# Patient Record
Sex: FEMALE | Race: WHITE | NOT HISPANIC OR LATINO | Employment: FULL TIME | RURAL
[De-identification: names, ages, dates, MRNs, and addresses within clinical notes are randomized per-mention and may not be internally consistent; named-entity substitution may affect disease eponyms.]

---

## 2017-01-16 PROBLEM — Z00.00 WELLNESS EXAMINATION: Status: ACTIVE | Noted: 2017-01-16

## 2017-01-17 ENCOUNTER — OFFICE VISIT (OUTPATIENT)
Dept: FAMILY MEDICINE CLINIC | Facility: CLINIC | Age: 22
End: 2017-01-17

## 2017-01-17 VITALS
BODY MASS INDEX: 40.49 KG/M2 | HEART RATE: 108 BPM | OXYGEN SATURATION: 98 % | SYSTOLIC BLOOD PRESSURE: 108 MMHG | DIASTOLIC BLOOD PRESSURE: 78 MMHG | RESPIRATION RATE: 16 BRPM | HEIGHT: 67 IN | WEIGHT: 258 LBS | TEMPERATURE: 98.7 F

## 2017-01-17 DIAGNOSIS — H60.90 OTITIS EXTERNA, UNSPECIFIED CHRONICITY, UNSPECIFIED LATERALITY, UNSPECIFIED TYPE: ICD-10-CM

## 2017-01-17 DIAGNOSIS — H92.09 OTALGIA, UNSPECIFIED LATERALITY: Primary | ICD-10-CM

## 2017-01-17 PROCEDURE — 99212 OFFICE O/P EST SF 10 MIN: CPT | Performed by: FAMILY MEDICINE

## 2017-01-17 RX ORDER — AZELASTINE HYDROCHLORIDE 0.5 MG/ML
1 SOLUTION/ DROPS OPHTHALMIC 2 TIMES DAILY PRN
Refills: 2 | COMMUNITY
Start: 2016-12-07 | End: 2017-05-01

## 2017-01-17 RX ORDER — ALPRAZOLAM 0.5 MG/1
0.25 TABLET ORAL 3 TIMES DAILY PRN
Qty: 15 TABLET | Refills: 0 | OUTPATIENT
Start: 2017-01-17 | End: 2017-02-17 | Stop reason: SDUPTHER

## 2017-01-17 RX ORDER — CITALOPRAM 20 MG/1
TABLET ORAL
Qty: 30 TABLET | Refills: 5 | Status: SHIPPED | OUTPATIENT
Start: 2017-01-17 | End: 2018-03-15 | Stop reason: SDUPTHER

## 2017-01-17 RX ORDER — HYDROCORTISONE AND ACETIC ACID 20.75; 10.375 MG/ML; MG/ML
3 SOLUTION AURICULAR (OTIC) 2 TIMES DAILY PRN
Qty: 10 ML | Refills: 0 | Status: SHIPPED | OUTPATIENT
Start: 2017-01-17 | End: 2017-09-13

## 2017-01-17 RX ORDER — FENOPROFEN CALCIUM 200 MG
CAPSULE ORAL 2 TIMES DAILY
Qty: 59 ML | Refills: 0 | Status: SHIPPED | OUTPATIENT
Start: 2017-01-17 | End: 2017-09-13

## 2017-01-17 NOTE — PROGRESS NOTES
Subjective   My Chavez is a 21 y.o. female presenting with chief complaint of:   Chief Complaint   Patient presents with   • Earache     both, R is worse       History of Present Illness : Alone. She has had 2-3 weeks of itching, mild discomfort both ear canals.  She denies sinus/nasal congestion, fullness of ears, decreased hearing, otorrhea, sore throat, cough.     Other chronic problem/s to consider: none    The following portions of the patient's history were reviewed and updated as appropriate: allergies, current medications, past family history, past medical history, past social history, past surgical history and problem list.  TCC migrated      Current Outpatient Prescriptions:   •  azelastine (OPTIVAR) 0.05 % ophthalmic solution, Administer 1 drop to both eyes 2 (Two) Times a Day As Needed., Disp: , Rfl: 2  •  ferrous sulfate 325 (65 FE) MG tablet, Take 325 mg by mouth daily with breakfast., Disp: , Rfl:   •  ALPRAZolam (XANAX) 0.5 MG tablet, Take 0.5 tablets by mouth 3 (Three) Times a Day As Needed for anxiety., Disp: 15 tablet, Rfl: 0  •  citalopram (CeleXA) 20 MG tablet, TAKE ONE TABLET DAILY, Disp: 30 tablet, Rfl: 5    No Known Allergies    Review of Systems  ROS:  GENERAL:  Active home/work.  Sleeps ok. No fever.d.  HEENT: No head injury or headache,  No vision change, No hearing loss/pain/drainage.  No sore throat.  No nasal/sinus congestion/drainage. No epistaxis.  CHEST: No chest wall tenderness or mass. No cough, wheeze, SOB or hemoptysis.    Results for orders placed or performed in visit on 10/04/16   Vitamin B12   Result Value Ref Range    Vitamin B-12 675 239 - 931 pg/mL   Folate   Result Value Ref Range    Folate 12.30 >2.75 ng/mL   Ferritin   Result Value Ref Range    Ferritin 13.80 6.24 - 137.00 ng/mL   Iron level   Result Value Ref Range    Iron 37 (L) 42 - 180 mcg/dL       No results found for: HGBA1C    Lab Results   Component Value Date     09/27/2016    K 4.3 09/27/2016    CL  "105 09/27/2016    CO2 23.0 (L) 09/27/2016    BUN 11 09/27/2016    CREATININE 0.59 09/27/2016    CALCIUM 9.1 09/27/2016       No results found for: PSA     Objective   Visit Vitals   • /78 (BP Location: Left arm, Patient Position: Sitting, Cuff Size: Large Adult)   • Pulse 108   • Temp 98.7 °F (37.1 °C) (Oral)   • Resp 16   • Ht 67\" (170.2 cm)   • Wt 258 lb (117 kg)   • LMP 01/12/2017 (Exact Date)   • SpO2 98%   • BMI 40.41 kg/m2       Physical Exam  GENERAL:  Well nourished/developed  in no acute distress. Obese.   SKIN: Turgor excellent, without wound, rash, lesion.   HEENT: Normal cephalic without trauma.  Pupils equal round reactive to light. Extraocular motions full without nystagmus.   External canals nonobstructive nontender without reddness; mild scaley. Tymphatic membranes raman with cheikh structures intact.   Oral cavity without growths, exudates, and moist.  Posterior pharnyx without mass, obstruction, reddness.  No thyroidmegaly, mass, tenderness, lymphadenopathy and supple.   CV: Regular rhythm.  No murmur, gallop, edema.   CHEST: No chest wall tenderness or mass.    PSYCH: Oriented x 3.  Pleasant calm, well kept.  Purposeful/directed conservation with intact short/long gross memory.     Assessment/Plan   1. Otalgia, unspecified laterality  But likely otitis externa-chronic  - hydrocortisone ([employer] CORTISONE LONG-LASTING) 1 % lotion; Apply  topically 2 (Two) Times a Day.  Dispense: 59 mL; Refill: 0  - acetic acid-hydrocortisone (VOSOL-HC) 1-2 % otic solution; Administer 3 drops into both ears 2 (Two) Times a Day As Needed (itching ears).  Dispense: 10 mL; Refill: 0    2. Otitis externa, unspecified chronicity, unspecified laterality, unspecified type  same  - hydrocortisone (CVS CORTISONE LONG-LASTING) 1 % lotion; Apply  topically 2 (Two) Times a Day.  Dispense: 59 mL; Refill: 0  - acetic acid-hydrocortisone (VOSOL-HC) 1-2 % otic solution; Administer 3 drops into both ears 2 (Two) Times a Day As " Needed (itching ears).  Dispense: 10 mL; Refill: 0    Rx: reviewed/see above and:  LAB: reviewed past TCC as needed, above and new orders: none  Wrap-up/other instructions  There are no Patient Instructions on file for this visit.    Follow up: Return if symptoms worsen or fail to improve.

## 2017-01-17 NOTE — MR AVS SNAPSHOT
My Chavez   1/17/2017 11:30 AM   Office Visit    Dept Phone:  [phone]   Encounter #:  [id number]    Provider:  Mak Nuñez MD   Department:  [facility]                Your Full Care Plan              Today's Medication Changes          These changes are accurate as of: 1/17/17 12:15 PM.  If you have any questions, ask your nurse or doctor.               New Medication(s)Ordered:     acetic acid-hydrocortisone 1-2 % otic solution   Commonly known as:  VOSOL-HC   Administer 3 drops into both ears 2 (Two) Times a Day As Needed (itching ears).   Started by:  Mak Nuñez MD       hydrocortisone 1 % lotion   Commonly known as:  [employer]S CORTISONE LONG-LASTING   Apply  topically 2 (Two) Times a Day.   Started by:  Mak Nuñez MD         Stop taking medication(s)listed here:     naltrexone-bupropion ER 8-90 MG tablet   Commonly known as:  CONTRAVE   Stopped by:  Mak Nuñez MD                Where to Get Your Medications      These medications were sent to [facility] - [place], [place] - [address] - [phone]  - [phone] FX  [address] [place] [address]     Phone:  [phone]     acetic acid-hydrocortisone 1-2 % otic solution    hydrocortisone 1 % lotion                  Your Updated Medication List          This list is accurate as of: 1/17/17 12:15 PM.  Always use your most recent med list.                acetic acid-hydrocortisone 1-2 % otic solution   Commonly known as:  VOSOL-HC   Administer 3 drops into both ears 2 (Two) Times a Day As Needed (itching ears).       ALPRAZolam 0.5 MG tablet   Commonly known as:  XANAX       azelastine 0.05 % ophthalmic solution   Commonly known as:  OPTIVAR       ferrous sulfate 325 (65 FE) MG tablet       hydrocortisone 1 % lotion   Commonly known as:  CVS CORTISONE LONG-LASTING   Apply  topically 2 (Two) Times a Day.               You Were Diagnosed With        "Codes Comments    Otalgia, unspecified laterality    -  Primary ICD-10-CM: H92.09  ICD-9-CM: 388.70     Otitis externa, unspecified chronicity, unspecified laterality, unspecified type     ICD-10-CM: H60.90  ICD-9-CM: 380.10       Instructions     None    Patient Instructions History      Upcoming Appointments     Visit Type Date Time Department    OFFICE VISIT 1/17/2017 11:30 AM [facility]      [employer] Signup     Our records indicate that you have declined [employer] signup. If you would like to sign up for [employer], please email [email or web address] or call [phone] to obtain an activation code.             Other Info from Your Visit           Allergies     No Known Allergies      Reason for Visit     Earache both, R is worse      Vital Signs     Blood Pressure Pulse Temperature Respirations Height Weight    108/78 (BP Location: Left arm, Patient Position: Sitting, Cuff Size: Large Adult) 108 98.7 °F (37.1 °C) (Oral) 16 67\" (170.2 cm) 258 lb (117 kg)    Last Menstrual Period Oxygen Saturation Body Mass Index Smoking Status          01/12/2017 (Exact Date) 98% 40.41 kg/m2 Former Smoker        Problems and Diagnoses Noted     Otitis externa    Wellness examination    Ear pain    -  Primary        "

## 2017-01-25 ENCOUNTER — TELEPHONE (OUTPATIENT)
Dept: FAMILY MEDICINE CLINIC | Facility: CLINIC | Age: 22
End: 2017-01-25

## 2017-01-25 PROBLEM — R04.0 EPISTAXIS: Status: ACTIVE | Noted: 2017-01-25

## 2017-01-25 NOTE — TELEPHONE ENCOUNTER
Pt came in and filled out nurse communication form and states she has had a cold for about 2 weeks but in last 24 hours anytime pt sneezes her nose has started bleeding and pt is wanting to know what to do, [phone] AVTAR

## 2017-02-17 RX ORDER — ALPRAZOLAM 0.5 MG/1
TABLET ORAL
Qty: 15 TABLET | Refills: 0 | OUTPATIENT
Start: 2017-02-17 | End: 2017-05-25 | Stop reason: SDUPTHER

## 2017-05-01 ENCOUNTER — OFFICE VISIT (OUTPATIENT)
Dept: FAMILY MEDICINE CLINIC | Facility: CLINIC | Age: 22
End: 2017-05-01

## 2017-05-01 ENCOUNTER — TELEPHONE (OUTPATIENT)
Dept: FAMILY MEDICINE CLINIC | Facility: CLINIC | Age: 22
End: 2017-05-01

## 2017-05-01 VITALS
TEMPERATURE: 98.3 F | OXYGEN SATURATION: 97 % | HEART RATE: 118 BPM | HEIGHT: 67 IN | BODY MASS INDEX: 40.18 KG/M2 | SYSTOLIC BLOOD PRESSURE: 130 MMHG | RESPIRATION RATE: 18 BRPM | DIASTOLIC BLOOD PRESSURE: 70 MMHG | WEIGHT: 256 LBS

## 2017-05-01 DIAGNOSIS — J32.9 SINUSITIS, UNSPECIFIED CHRONICITY, UNSPECIFIED LOCATION: Primary | ICD-10-CM

## 2017-05-01 DIAGNOSIS — R05.9 COUGH: ICD-10-CM

## 2017-05-01 DIAGNOSIS — J40 BRONCHITIS: ICD-10-CM

## 2017-05-01 PROCEDURE — 99212 OFFICE O/P EST SF 10 MIN: CPT | Performed by: FAMILY MEDICINE

## 2017-05-01 RX ORDER — GUAIFENESIN AND CODEINE PHOSPHATE 100; 10 MG/5ML; MG/5ML
5 SOLUTION ORAL 3 TIMES DAILY PRN
Qty: 180 ML | Refills: 0 | Status: SHIPPED | OUTPATIENT
Start: 2017-05-01 | End: 2017-09-13

## 2017-05-01 RX ORDER — AMOXICILLIN AND CLAVULANATE POTASSIUM 500; 125 MG/1; MG/1
1 TABLET, FILM COATED ORAL 2 TIMES DAILY
Refills: 0 | COMMUNITY
Start: 2017-04-27 | End: 2017-05-07

## 2017-05-01 RX ORDER — METHYLPREDNISOLONE 4 MG/1
TABLET ORAL
Qty: 21 TABLET | Refills: 0 | Status: SHIPPED | OUTPATIENT
Start: 2017-05-01 | End: 2017-09-13

## 2017-05-25 RX ORDER — ALPRAZOLAM 0.5 MG/1
TABLET ORAL
Qty: 15 TABLET | Refills: 0 | OUTPATIENT
Start: 2017-05-25 | End: 2017-08-07 | Stop reason: SDUPTHER

## 2017-08-07 RX ORDER — ALPRAZOLAM 0.5 MG/1
TABLET ORAL
Qty: 15 TABLET | Refills: 0 | OUTPATIENT
Start: 2017-08-07 | End: 2017-12-07 | Stop reason: SDUPTHER

## 2017-09-12 PROBLEM — Z01.89 LABORATORY TEST: Status: ACTIVE | Noted: 2017-09-12

## 2017-09-13 ENCOUNTER — OFFICE VISIT (OUTPATIENT)
Dept: FAMILY MEDICINE CLINIC | Facility: CLINIC | Age: 22
End: 2017-09-13

## 2017-09-13 VITALS
DIASTOLIC BLOOD PRESSURE: 62 MMHG | TEMPERATURE: 98.8 F | RESPIRATION RATE: 16 BRPM | HEIGHT: 67 IN | SYSTOLIC BLOOD PRESSURE: 102 MMHG | WEIGHT: 267 LBS | HEART RATE: 104 BPM | BODY MASS INDEX: 41.91 KG/M2 | OXYGEN SATURATION: 99 %

## 2017-09-13 DIAGNOSIS — F32.A DEPRESSION, UNSPECIFIED DEPRESSION TYPE: Chronic | ICD-10-CM

## 2017-09-13 DIAGNOSIS — E66.9 OBESITY, UNSPECIFIED OBESITY SEVERITY, UNSPECIFIED OBESITY TYPE: Primary | Chronic | ICD-10-CM

## 2017-09-13 DIAGNOSIS — F41.9 ANXIETY: Chronic | ICD-10-CM

## 2017-09-13 DIAGNOSIS — R10.9 ABDOMINAL PAIN, UNSPECIFIED LOCATION: ICD-10-CM

## 2017-09-13 DIAGNOSIS — K21.9 GASTROESOPHAGEAL REFLUX DISEASE, ESOPHAGITIS PRESENCE NOT SPECIFIED: ICD-10-CM

## 2017-09-13 DIAGNOSIS — D64.9 ANEMIA, UNSPECIFIED TYPE: ICD-10-CM

## 2017-09-13 PROCEDURE — 99213 OFFICE O/P EST LOW 20 MIN: CPT | Performed by: FAMILY MEDICINE

## 2017-09-13 RX ORDER — CALCIUM CARBONATE 750 MG/1
1500 TABLET, CHEWABLE ORAL 4 TIMES DAILY PRN
COMMUNITY
Start: 2017-07-13 | End: 2018-05-31

## 2017-09-13 RX ORDER — PANTOPRAZOLE SODIUM 40 MG/1
40 TABLET, DELAYED RELEASE ORAL DAILY
Qty: 30 TABLET | Refills: 2 | Status: SHIPPED | OUTPATIENT
Start: 2017-09-13 | End: 2018-03-15 | Stop reason: SDUPTHER

## 2017-09-13 RX ORDER — RANITIDINE 150 MG/1
150 TABLET ORAL DAILY
COMMUNITY
Start: 2017-07-26 | End: 2017-09-13

## 2017-09-13 NOTE — PROGRESS NOTES
Subjective   My Chavez is a 22 y.o. female presenting with chief complaint of:   Chief Complaint   Patient presents with   • Heartburn       History of Present Illness :  Alone.  Here for primarily an acute issue today; reflux.   Periodic feels material come into throat with hearburn.   No choking.   Has decreased meals to small amounts to try to help.  Ongoing badly two months.  Getting worse..   Has chronic problems to consider.  One of these problems is obesity.        Other chronic problem/s to consider:   Anemia/iron deficiency: This has been present for years/over a year.  It is chronic.  These has been GI evaulation in the past. There is no current melena, hematochezia. It has been benign to date and stable/treating with iron/watching.  Anxiety: This has been present for years/over a year.  It is chronic.  It is stable.  It is associated with stressors:.  Medications being used help.  Depression: This has been present for years/over a year.  It is chronic.  It is stable.  It is associated with stressors: Medications being used help.  No suicide ideation/intent.   Obesity/overweight: This has been present for years/over a year.  It is chronic.     It is stable; there has been no recent major change in weight, or dieting  Associated illnesses noted that are affected by this illness. Has considered obesity surgery.     Has an/another acute issue today: none.    The following portions of the patient's history were reviewed and updated as appropriate: allergies, current medications, past family history, past medical history, past social history, past surgical history and problem list.  Records acquired and reviewed; TCC migrated.      Current Outpatient Prescriptions:   •  ALPRAZolam (XANAX) 0.5 MG tablet, TAKE 1/2 TABLET THREE TIMES DAILY AS NEEDED, Disp: 15 tablet, Rfl: 0  •  calcium carbonate EX (TUMS EX) 750 MG chewable tablet, Chew 1,500 mg 4 (Four) Times a Day As Needed for Indigestion or Heartburn.,  Disp: , Rfl:   •  citalopram (CeleXA) 20 MG tablet, TAKE ONE TABLET DAILY, Disp: 30 tablet, Rfl: 5  •  ferrous sulfate 325 (65 FE) MG tablet, Take 325 mg by mouth daily with breakfast., Disp: , Rfl:   •  raNITIdine (ZANTAC) 150 MG tablet, Take 150 mg by mouth Daily. OTC, Disp: , Rfl:     Allergies   Allergen Reactions   • Contrave [Naltrexone-Bupropion Hcl Er]      Nausea         Review of Systems  GENERAL:  Active home/work without regular exercise. Sleep is ok; apnea denied. No fever now.  ENDO:  No syncope, near or diaphoretic sweaty spells..  HEENT: No head injury or headache,  No vision change, No hearing loss.  Ears without pain/drainage.  No sore throat.  No  significant nasal/sinus congestion/drainage. No epistaxis.  CHEST: No chest wall tenderness or mass. No  significant cough,  without wheeze, SOB; no hemoptysis.  CV: No chest pain, palpatations, ankle edema.  GI: No dysphagia.  No abdominal pain, diarrhea, constipation, rectal bleeding, or melena.  Occ heartburn as noticed.  :  Voids without dysuria, or incontience to completion.  ORTHO: No painful/swollen joints but various on /off sore.  No sore neck or back.  No acute neck or back pain without recent injury.   NEURO: No dizziness, weakness of extremities.  No numbness/parethesias.   PSYCH: No memory loss.  Mood good; occ anxious, depressed but/and not suicidal.  Tolerated stress.     Results for orders placed or performed in visit on 10/04/16   Vitamin B12   Result Value Ref Range    Vitamin B-12 675 239 - 931 pg/mL   Folate   Result Value Ref Range    Folate 12.30 >2.75 ng/mL   Ferritin   Result Value Ref Range    Ferritin 13.80 6.24 - 137.00 ng/mL   Iron level   Result Value Ref Range    Iron 37 (L) 42 - 180 mcg/dL       No results found for: HGBA1C    Lab Results   Component Value Date     09/27/2016    K 4.3 09/27/2016     09/27/2016    CO2 23.0 (L) 09/27/2016    BUN 11 09/27/2016    CREATININE 0.59 09/27/2016    CALCIUM 9.1  "09/27/2016       No results found for: PSA     Lab Results:  CBC:    Lab Results - Last 18 Months  Lab Units 09/27/16  1348   WBC 10*3/mm3 7.25   HEMATOCRIT % 35.7*     CMP:    Lab Results - Last 18 Months  Lab Units 09/27/16  1348   SODIUM mmol/L 140   CHLORIDE mmol/L 105   TOTAL CO2, ARTERIAL mmol/L 23.0*   BUN mg/dL 11   CREATININE mg/dL 0.59   EGFR IF NONAFRICN AM mL/min/1.73 129   EGFR IF AFRICN AM mL/min/1.73 >150   CALCIUM mg/dL 9.1     THYROID:    Lab Results - Last 18 Months  Lab Units 09/27/16  1348   TSH mIU/mL 1.500     A1C:No results for input(s): HGBA1C in the last 91128 hours.    Objective   /62 (BP Location: Left arm, Patient Position: Sitting, Cuff Size: Large Adult)  Pulse 104  Temp 98.8 °F (37.1 °C) (Oral)   Resp 16  Ht 67\" (170.2 cm)  Wt 267 lb (121 kg)  LMP 08/30/2017 (Exact Date)  SpO2 99%  Breastfeeding? No  BMI 41.82 kg/m2    Physical Exam  GENERAL:  Well nourished/developed in no acute distress. Obese   SKIN: Turgor excellent, without wound, rash, lesion.  HEENT: Normal cephalic without trauma.  Pupils equal round reactive to light. Extraocular motions full without nystagmus.   External canals nonobstructive nontender without reddness. Tymphatic membranes raman with cheikh structures intact.   Oral cavity without growths, exudates, and moist.  Posterior pharnyx without mass, obstruction, reddness.  No thyroidmegaly, mass, tenderness, lymphadenopathy and supple.  CV: Regular rhythm.  No murmur, gallop, edema..  CHEST: No chest wall tenderness or mass.   LUNGS: Symmetric motion with clear to auscultation.  ABD: Soft, nontender without mass.   ORTHO: Symmetric extremities without swelling/point tenderness.  Full gross range of motion.  NEURO: CN 2-12 grossly intact.  Symmetric facies.  UE/LE   3/5 strength throughout.  Nonfocal use extremities. Speech clear.  ReducIntact    PSYCH: Oriented x 3.  Pleasant calm, well kept.  Purposeful/directed conservation with intact short/long " gross memory.     Assessment/Plan     1. Obesity, unspecified obesity severity, unspecified obesity type  Part of the reflux likely  - pantoprazole (PROTONIX) 40 MG EC tablet; Take 1 tablet by mouth Daily.  Dispense: 30 tablet; Refill: 2    2. Anemia, unspecified type  Has been/iron involved; ? Gi contribution  - pantoprazole (PROTONIX) 40 MG EC tablet; Take 1 tablet by mouth Daily.  Dispense: 30 tablet; Refill: 2  - Ambulatory Referral to Gastroenterology    3. Abdominal pain, unspecified location  Misti on/off  - pantoprazole (PROTONIX) 40 MG EC tablet; Take 1 tablet by mouth Daily.  Dispense: 30 tablet; Refill: 2  - Ambulatory Referral to Gastroenterology    4. Depression, unspecified depression type  Chronic/controlled    5. Anxiety  Chronic/controlled    6. Gastroesophageal reflux disease, esophagitis presence not specified  significant      Rx: reviewed.  Any other changes above and:   LAB: reviewed/above.  Orders above and:     Patient Instructions   Price qysmia and Beliviq      Follow up: Return for 4) Dr Nuñez-, as planned:.  Future Appointments  Date Time Provider Department Center   11/9/2017 1:00 PM Mak Nuñez MD [facility] None

## 2017-09-16 ENCOUNTER — HOSPITAL ENCOUNTER (EMERGENCY)
Facility: HOSPITAL | Age: 22
Discharge: HOME | End: 2017-09-16
Attending: EMERGENCY MEDICINE | Admitting: EMERGENCY MEDICINE

## 2017-09-16 VITALS
BODY MASS INDEX: 41.91 KG/M2 | TEMPERATURE: 97.5 F | RESPIRATION RATE: 16 BRPM | SYSTOLIC BLOOD PRESSURE: 128 MMHG | HEIGHT: 67 IN | OXYGEN SATURATION: 99 % | WEIGHT: 267 LBS | DIASTOLIC BLOOD PRESSURE: 82 MMHG | HEART RATE: 80 BPM

## 2017-09-16 DIAGNOSIS — R11.11 NON-INTRACTABLE VOMITING WITHOUT NAUSEA, UNSPECIFIED VOMITING TYPE: Primary | ICD-10-CM

## 2017-09-16 LAB
ALBUMIN SERPL-MCNC: 4.8 G/DL (ref 3.5–5)
ALBUMIN/GLOB SERPL: 1.5 G/DL (ref 1.1–2.5)
ALP SERPL-CCNC: 79 U/L (ref 24–120)
ALT SERPL W P-5'-P-CCNC: 50 U/L (ref 0–54)
AMYLASE SERPL-CCNC: 87 U/L (ref 30–110)
ANION GAP SERPL CALCULATED.3IONS-SCNC: 11 MMOL/L (ref 4–13)
ANISOCYTOSIS BLD QL: NORMAL
AST SERPL-CCNC: 38 U/L (ref 7–45)
B-HCG UR QL: NEGATIVE
BASOPHILS # BLD AUTO: 0.05 10*3/MM3 (ref 0–0.2)
BASOPHILS NFR BLD AUTO: 0.6 % (ref 0–2)
BILIRUB SERPL-MCNC: 0.6 MG/DL (ref 0.1–1)
BILIRUB UR QL STRIP: NEGATIVE
BUN BLD-MCNC: 14 MG/DL (ref 5–21)
BUN/CREAT SERPL: 20.6 (ref 7–25)
CALCIUM SPEC-SCNC: 9.5 MG/DL (ref 8.4–10.4)
CHLORIDE SERPL-SCNC: 108 MMOL/L (ref 98–110)
CLARITY UR: ABNORMAL
CO2 SERPL-SCNC: 22 MMOL/L (ref 24–31)
COLOR UR: YELLOW
CREAT BLD-MCNC: 0.68 MG/DL (ref 0.5–1.4)
DEPRECATED RDW RBC AUTO: 39.7 FL (ref 40–54)
EOSINOPHIL # BLD AUTO: 0.26 10*3/MM3 (ref 0–0.7)
EOSINOPHIL NFR BLD AUTO: 2.9 % (ref 0–4)
ERYTHROCYTE [DISTWIDTH] IN BLOOD BY AUTOMATED COUNT: 14.7 % (ref 12–15)
GFR SERPL CREATININE-BSD FRML MDRD: 108 ML/MIN/1.73
GLOBULIN UR ELPH-MCNC: 3.2 GM/DL
GLUCOSE BLD-MCNC: 90 MG/DL (ref 70–100)
GLUCOSE UR STRIP-MCNC: NEGATIVE MG/DL
HCT VFR BLD AUTO: 38.2 % (ref 37–47)
HGB BLD-MCNC: 12.5 G/DL (ref 12–16)
HGB UR QL STRIP.AUTO: NEGATIVE
HOLD SPECIMEN: NORMAL
HOLD SPECIMEN: NORMAL
IMM GRANULOCYTES # BLD: 0.01 10*3/MM3 (ref 0–0.03)
IMM GRANULOCYTES NFR BLD: 0.1 % (ref 0–5)
KETONES UR QL STRIP: NEGATIVE
LEUKOCYTE ESTERASE UR QL STRIP.AUTO: NEGATIVE
LIPASE SERPL-CCNC: 68 U/L (ref 23–203)
LYMPHOCYTES # BLD AUTO: 2.47 10*3/MM3 (ref 0.72–4.86)
LYMPHOCYTES NFR BLD AUTO: 27.8 % (ref 15–45)
MCH RBC QN AUTO: 24.4 PG (ref 28–32)
MCHC RBC AUTO-ENTMCNC: 32.7 G/DL (ref 33–36)
MCV RBC AUTO: 74.6 FL (ref 82–98)
MICROCYTES BLD QL: NORMAL
MONOCYTES # BLD AUTO: 0.64 10*3/MM3 (ref 0.19–1.3)
MONOCYTES NFR BLD AUTO: 7.2 % (ref 4–12)
NEUTROPHILS # BLD AUTO: 5.44 10*3/MM3 (ref 1.87–8.4)
NEUTROPHILS NFR BLD AUTO: 61.4 % (ref 39–78)
NITRITE UR QL STRIP: NEGATIVE
NRBC BLD MANUAL-RTO: 0 /100 WBC (ref 0–0)
PH UR STRIP.AUTO: 6 [PH] (ref 5–8)
PLAT MORPH BLD: NORMAL
PLATELET # BLD AUTO: 291 10*3/MM3 (ref 130–400)
PMV BLD AUTO: 10.3 FL (ref 6–12)
POTASSIUM BLD-SCNC: 4.3 MMOL/L (ref 3.5–5.3)
PROT SERPL-MCNC: 8 G/DL (ref 6.3–8.7)
PROT UR QL STRIP: NEGATIVE
RBC # BLD AUTO: 5.12 10*6/MM3 (ref 4.2–5.4)
SODIUM BLD-SCNC: 141 MMOL/L (ref 135–145)
SP GR UR STRIP: 1.02 (ref 1–1.03)
UROBILINOGEN UR QL STRIP: ABNORMAL
WBC MORPH BLD: NORMAL
WBC NRBC COR # BLD: 8.87 10*3/MM3 (ref 4.8–10.8)
WHOLE BLOOD HOLD SPECIMEN: NORMAL
WHOLE BLOOD HOLD SPECIMEN: NORMAL

## 2017-09-16 PROCEDURE — 25010000002 ONDANSETRON PER 1 MG: Performed by: EMERGENCY MEDICINE

## 2017-09-16 PROCEDURE — 96361 HYDRATE IV INFUSION ADD-ON: CPT

## 2017-09-16 PROCEDURE — 85025 COMPLETE CBC W/AUTO DIFF WBC: CPT | Performed by: EMERGENCY MEDICINE

## 2017-09-16 PROCEDURE — 99283 EMERGENCY DEPT VISIT LOW MDM: CPT

## 2017-09-16 PROCEDURE — 82150 ASSAY OF AMYLASE: CPT | Performed by: EMERGENCY MEDICINE

## 2017-09-16 PROCEDURE — 81003 URINALYSIS AUTO W/O SCOPE: CPT | Performed by: EMERGENCY MEDICINE

## 2017-09-16 PROCEDURE — 81025 URINE PREGNANCY TEST: CPT | Performed by: EMERGENCY MEDICINE

## 2017-09-16 PROCEDURE — 96374 THER/PROPH/DIAG INJ IV PUSH: CPT

## 2017-09-16 PROCEDURE — 96376 TX/PRO/DX INJ SAME DRUG ADON: CPT

## 2017-09-16 PROCEDURE — 83690 ASSAY OF LIPASE: CPT | Performed by: EMERGENCY MEDICINE

## 2017-09-16 PROCEDURE — 85007 BL SMEAR W/DIFF WBC COUNT: CPT | Performed by: EMERGENCY MEDICINE

## 2017-09-16 PROCEDURE — 80053 COMPREHEN METABOLIC PANEL: CPT | Performed by: EMERGENCY MEDICINE

## 2017-09-16 PROCEDURE — 96375 TX/PRO/DX INJ NEW DRUG ADDON: CPT

## 2017-09-16 RX ORDER — FAMOTIDINE 10 MG/ML
20 INJECTION, SOLUTION INTRAVENOUS EVERY 12 HOURS SCHEDULED
Status: DISCONTINUED | OUTPATIENT
Start: 2017-09-16 | End: 2017-09-17 | Stop reason: HOSPADM

## 2017-09-16 RX ORDER — ONDANSETRON 2 MG/ML
4 INJECTION INTRAMUSCULAR; INTRAVENOUS ONCE
Status: DISCONTINUED | OUTPATIENT
Start: 2017-09-16 | End: 2017-09-16

## 2017-09-16 RX ORDER — ONDANSETRON 2 MG/ML
4 INJECTION INTRAMUSCULAR; INTRAVENOUS ONCE
Status: COMPLETED | OUTPATIENT
Start: 2017-09-16 | End: 2017-09-16

## 2017-09-16 RX ADMIN — ONDANSETRON 4 MG: 2 INJECTION INTRAMUSCULAR; INTRAVENOUS at 21:32

## 2017-09-16 RX ADMIN — SODIUM CHLORIDE 1000 ML: 9 INJECTION, SOLUTION INTRAVENOUS at 21:50

## 2017-09-16 RX ADMIN — FAMOTIDINE 20 MG: 10 INJECTION, SOLUTION INTRAVENOUS at 22:19

## 2017-09-16 RX ADMIN — ONDANSETRON 4 MG: 2 INJECTION INTRAMUSCULAR; INTRAVENOUS at 22:31

## 2017-09-17 NOTE — DISCHARGE INSTRUCTIONS
Clear liquid diet for next 24 hours.  If symptoms increase worsen or change return back to  ER.  Protonix 40 mg every morning.  Keep your appointment with a gastroenterologist for Monday morning for possible EGD evaluation.

## 2017-09-17 NOTE — ED PROVIDER NOTES
Subjective   HPI Comments: The patient is a 22-year-old female presents to our facility with family members with a complaint of vomiting.  She states that she has been vomiting since yesterday and over the last 24 hours, she is unable to keep anything down.  She states even water will make her vomit.  This morning she felt somewhat better so she tried eating a pizza, which resulted in vomiting.    Currently, she has moderate amount of abdominal discomfort.  She states predominantly because of the vomiting episodes.  She also feels like she noticed some blood in the vomitus the last time she vomited.  She has had no abdominal surgeries.    She is scheduled for an EGD on Monday scheduled by her primary care doctor Dr. Nuñez.  This morning she tried Protonix, but she feels like she kept that down because she had taken it with water and there was no vomiting episode for few more hours.    Patient is a 22 y.o. female presenting with vomiting.   History provided by:  Patient and significant other  History limited by:  Acuity of condition  Vomiting   The primary symptoms include nausea, vomiting and hematemesis. The illness began today. The onset was gradual. The problem has not changed since onset.      Review of Systems   Constitutional: Positive for activity change and appetite change.   HENT: Negative.    Eyes: Negative.    Respiratory: Negative.    Cardiovascular: Negative.    Gastrointestinal: Positive for hematemesis, nausea and vomiting.   Endocrine: Negative.    Genitourinary: Negative.    Musculoskeletal: Negative.    Skin: Negative.    Allergic/Immunologic: Negative.    Neurological: Negative.    Hematological: Negative.    Psychiatric/Behavioral: Negative.    All other systems reviewed and are negative.      Past Medical History:   Diagnosis Date   • Anxiety    • Depression        Allergies   Allergen Reactions   • Contrave [Naltrexone-Bupropion Hcl Er]      Nausea         Past Surgical History:   Procedure  Laterality Date   • TONSILLECTOMY         Family History   Problem Relation Age of Onset   • Colon cancer Neg Hx    • Esophageal cancer Neg Hx        Social History     Social History   • Marital status: Unknown     Spouse name: N/A   • Number of children: 0   • Years of education: 14     Occupational History   • phlebotomist      [employer]     Social History Main Topics   • Smoking status: Former Smoker     Packs/day: 0.10     Types: Cigarettes     Start date: 9/27/2014     Quit date: 10/27/2016   • Smokeless tobacco: Never Used   • Alcohol use 0.6 oz/week     1 Glasses of wine per week      Comment: occ    • Drug use: No   • Sexual activity: Not Currently     Partners: Male     Birth control/ protection: Condom     Other Topics Concern   • None     Social History Narrative           Objective   Physical Exam   Constitutional: She is oriented to person, place, and time. She appears well-developed and well-nourished.   Complaints of nauseas and vomiting blood.     HENT:   Head: Normocephalic and atraumatic.   Right Ear: External ear normal.   Left Ear: External ear normal.   Nose: Nose normal.   Mouth/Throat: Oropharynx is clear and moist.   Eyes: Conjunctivae and EOM are normal. Pupils are equal, round, and reactive to light.   Neck: Normal range of motion. Neck supple.   Cardiovascular: Normal rate, regular rhythm and normal heart sounds.    Pulmonary/Chest: Effort normal and breath sounds normal.   Abdominal: There is tenderness. There is guarding.   Musculoskeletal: Normal range of motion.   Neurological: She is alert and oriented to person, place, and time. She has normal reflexes.   Skin: Skin is warm and dry.   Psychiatric: She has a normal mood and affect. Her behavior is normal. Judgment and thought content normal.   Nursing note and vitals reviewed.      Procedures             My Chavez #[id number]  (22 y.o. F) 21             Lab Results           Urinalysis With / Culture If Indicated (Final  result)    Abnormal Component (Lab Inquiry)       Collection Time Result Time COLOR U CLARITY U PH, UR SPECGRAV U GLUCOSE U     09/16/17 21:50:00 09/16/17 22:27:00 Yellow Cloudy (A) 6.0 1.025 Negative          Collection Time Result Time KETONES U BILIRUBIN, UR BLOOD U PROTEIN U LEUKOCYTUR     09/16/17 21:50:00 09/16/17 22:27:00 Negative Negative Negative Negative Negative          Collection Time Result Time NITRITE U URBLNGN UA     09/16/17 21:50:00 09/16/17 22:27:00 Negative 0.2 E.U./dL                 Final result     Narrative:     Urine microscopic not indicated.               Pregnancy, Urine (Final result) Component (Lab Inquiry)       Collection Time Result Time HCG Ur     09/16/17 21:50:00 09/16/17 22:31:00 Negative                      [facility] Draw (Final result) Result time: 09/16/17 23:02:36     Final result     Narrative:     The following orders were created for panel order [facility] Draw.  Procedure                               Abnormality         Status                     ---------                               -----------         ------                     Light Blue Top[[id number]]                                   Final result               Green Top (Gel)[[id number]]                                  Final result               Lavender Top[[id number]]                                     Final result               Red Top[[id number]]                                          Final result                 Please view results for these tests on the individual orders.               Light Blue Top (Final result) Component (Lab Inquiry)       Collection Time Result Time Extra Tube     09/16/17 21:28:00 09/16/17 23:02:00 hold for add-on  Auto resulted                      Green Top (Gel) (Final result) Component (Lab Inquiry)       Collection Time Result Time Extra Tube     09/16/17 21:28:00 09/16/17 23:02:00 Hold for add-ons.  Auto resulted.                      Lavender Top (Final result) Component (Lab  Inquiry)       Collection Time Result Time Extra Tube     09/16/17 21:28:00 09/16/17 23:02:00 hold for add-on  Auto resulted                      Red Top (Final result) Component (Lab Inquiry)       Collection Time Result Time Extra Tube     09/16/17 21:28:00 09/16/17 23:02:00 Hold for add-ons.  Auto resulted.                      CBC & Differential (Final result) Result time: 09/16/17 22:01:26     Final result     Narrative:     The following orders were created for panel order CBC & Differential.  Procedure                               Abnormality         Status                     ---------                               -----------         ------                     Scan Slide[[id number]]                                       Final result               CBC Auto Differential[[id number]]        Abnormal            Final result                 Please view results for these tests on the individual orders.               Comprehensive Metabolic Panel (Final result)    Abnormal Component (Lab Inquiry)       Collection Time Result Time GLU BUN CREATININE NA K     09/16/17 21:28:00 09/16/17 21:50:00 90 14 0.68 141 4.3          Collection Time Result Time CL CO2 CALCIUM PROTEIN ALB     09/16/17 21:28:00 09/16/17 21:50:00 108 22.0 (L) 9.5 8.0 4.80          Collection Time Result Time ALT AST ALK PHOS BILI Total EGFRIFNONA     09/16/17 21:28:00 09/16/17 21:50:00 50 38 79 0.6 108          Collection Time Result Time GLOB A/G Ratio BCR ANION GAP     09/16/17 21:28:00 09/16/17 21:50:00 3.2 1.5 20.6 11.0                      Amylase (Final result) Component (Lab Inquiry)       Collection Time Result Time AMYLASE     09/16/17 21:28:00 09/16/17 21:50:00 87                      Lipase (Final result) Component (Lab Inquiry)       Collection Time Result Time Lipase     09/16/17 21:28:00 09/16/17 21:50:00 68                      CBC Auto Differential (Final result)    Abnormal Component (Lab Inquiry)       Collection Time Result Time  WBC ADJ RBC HGB HCT MCV     09/16/17 21:28:00 09/16/17 22:01:00 8.87 5.12 12.5 38.2 74.6 (L)          Collection Time Result Time MCH MCHC RDW RDWSD MPV     09/16/17 21:28:00 09/16/17 22:01:00 24.4 (L) 32.7 (L) 14.7 39.7 (L) 10.3          Collection Time Result Time PLT NEUTRO PCT LYMPHO PCT MONO PCT EOS PCT     09/16/17 21:28:00 09/16/17 22:01:00 291 61.4 27.8 7.2 2.9          Collection Time Result Time BASOS PCT AUTO IG% NEUTRO ABS LYMPHS ABS MONOS ABS     09/16/17 21:28:00 09/16/17 22:01:00 0.6 0.1 5.44 2.47 0.64          Collection Time Result Time EOS ABS BASOS ABS AUTO IG# NRBC     09/16/17 21:28:00 09/16/17 22:01:00 0.26 0.05 0.01 0.0                      Scan Slide (Final result) Component (Lab Inquiry)       Collection Time Result Time ANISOCYTOS MICROCYTES WBC Morphology Plt Morph     09/16/17 21:28:00 09/16/17 22:01:00 Slight/1+ Slight/1+ Normal Normal                   Imaging Results      None       ECG Results      None            My Chavez #[id number]  (22 y.o. F) 21             Lab Results           Urinalysis With / Culture If Indicated (Final result)    Abnormal Component (Lab Inquiry)       Collection Time Result Time COLOR U CLARITY U PH, UR SPECGRAV U GLUCOSE U     09/16/17 21:50:00 09/16/17 22:27:00 Yellow Cloudy (A) 6.0 1.025 Negative          Collection Time Result Time KETONES U BILIRUBIN, UR BLOOD U PROTEIN U LEUKOCYTUR     09/16/17 21:50:00 09/16/17 22:27:00 Negative Negative Negative Negative Negative          Collection Time Result Time NITRITE U URBLNGN UA     09/16/17 21:50:00 09/16/17 22:27:00 Negative 0.2 E.U./dL                 Final result     Narrative:     Urine microscopic not indicated.               Pregnancy, Urine (Final result) Component (Lab Inquiry)       Collection Time Result Time HCG Ur     09/16/17 21:50:00 09/16/17 22:31:00 Negative                      [place] Draw (Final result) Result time: 09/16/17 23:02:36     Final result     Narrative:     The following  orders were created for panel order [place] Draw.  Procedure                               Abnormality         Status                     ---------                               -----------         ------                     Light Blue Top[[id number]]                                   Final result               Green Top (Gel)[[id number]]                                  Final result               Lavender Top[[id number]]                                     Final result               Red Top[[id number]]                                          Final result                 Please view results for these tests on the individual orders.               Light Blue Top (Final result) Component (Lab Inquiry)       Collection Time Result Time Extra Tube     09/16/17 21:28:00 09/16/17 23:02:00 hold for add-on  Auto resulted                      Green Top (Gel) (Final result) Component (Lab Inquiry)       Collection Time Result Time Extra Tube     09/16/17 21:28:00 09/16/17 23:02:00 Hold for add-ons.  Auto resulted.                      Lavender Top (Final result) Component (Lab Inquiry)       Collection Time Result Time Extra Tube     09/16/17 21:28:00 09/16/17 23:02:00 hold for add-on  Auto resulted                      Red Top (Final result) Component (Lab Inquiry)       Collection Time Result Time Extra Tube     09/16/17 21:28:00 09/16/17 23:02:00 Hold for add-ons.  Auto resulted.                      CBC & Differential (Final result) Result time: 09/16/17 22:01:26     Final result     Narrative:     The following orders were created for panel order CBC & Differential.  Procedure                               Abnormality         Status                     ---------                               -----------         ------                     Scan Slide[[id number]]                                       Final result               CBC Auto Differential[[id number]]        Abnormal            Final result                 Please view  results for these tests on the individual orders.               Comprehensive Metabolic Panel (Final result)    Abnormal Component (Lab Inquiry)       Collection Time Result Time GLU BUN CREATININE NA K     09/16/17 21:28:00 09/16/17 21:50:00 90 14 0.68 141 4.3          Collection Time Result Time CL CO2 CALCIUM PROTEIN ALB     09/16/17 21:28:00 09/16/17 21:50:00 108 22.0 (L) 9.5 8.0 4.80          Collection Time Result Time ALT AST ALK PHOS BILI Total EGFRIFNONA     09/16/17 21:28:00 09/16/17 21:50:00 50 38 79 0.6 108          Collection Time Result Time GLOB A/G Ratio BCR ANION GAP     09/16/17 21:28:00 09/16/17 21:50:00 3.2 1.5 20.6 11.0                      Amylase (Final result) Component (Lab Inquiry)       Collection Time Result Time AMYLASE     09/16/17 21:28:00 09/16/17 21:50:00 87                      Lipase (Final result) Component (Lab Inquiry)       Collection Time Result Time Lipase     09/16/17 21:28:00 09/16/17 21:50:00 68                      CBC Auto Differential (Final result)    Abnormal Component (Lab Inquiry)       Collection Time Result Time WBC ADJ RBC HGB HCT MCV     09/16/17 21:28:00 09/16/17 22:01:00 8.87 5.12 12.5 38.2 74.6 (L)          Collection Time Result Time MCH MCHC RDW RDWSD MPV     09/16/17 21:28:00 09/16/17 22:01:00 24.4 (L) 32.7 (L) 14.7 39.7 (L) 10.3          Collection Time Result Time PLT NEUTRO PCT LYMPHO PCT MONO PCT EOS PCT     09/16/17 21:28:00 09/16/17 22:01:00 291 61.4 27.8 7.2 2.9          Collection Time Result Time BASOS PCT AUTO IG% NEUTRO ABS LYMPHS ABS MONOS ABS     09/16/17 21:28:00 09/16/17 22:01:00 0.6 0.1 5.44 2.47 0.64          Collection Time Result Time EOS ABS BASOS ABS AUTO IG# NRBC     09/16/17 21:28:00 09/16/17 22:01:00 0.26 0.05 0.01 0.0                      Scan Slide (Final result) Component (Lab Inquiry)       Collection Time Result Time ANISOCYTOS MICROCYTES WBC Morphology Plt Morph     09/16/17 21:28:00 09/16/17 22:01:00 Slight/1+ Slight/1+  Normal Normal                   Imaging Results      None       ECG Results      None         ED Course  ED Course   Comment By Time   She continues to be nauseated despite 4 mg of Zofran on repeating a dose now. Nighat GRANDE MD 09/16 2222   Reexamination at 10:40 PM discussed with family and patient at bedside regarding our plan.  Her laboratory studies are within normal limits she continues to moderate amount nausea she has a scheduled appointment to see a gastroenterologist on Monday for possible scope.  I have asked her to continue with Protonix 40 mg a day in the morning on a regular basis.  And be on a clear liquid diet for the next 48 hours. Nighat GRANDE MD 09/16 2248                  MDM  Number of Diagnoses or Management Options  Non-intractable vomiting without nausea, unspecified vomiting type: new and does not require workup     Amount and/or Complexity of Data Reviewed  Clinical lab tests: ordered and reviewed  Discussion of test results with the performing providers: yes  Decide to obtain previous medical records or to obtain history from someone other than the patient: yes  Obtain history from someone other than the patient: yes  Independent visualization of images, tracings, or specimens: yes    Risk of Complications, Morbidity, and/or Mortality  Presenting problems: moderate  Diagnostic procedures: moderate  Management options: moderate    Patient Progress  Patient progress: stable      Final diagnoses:   Non-intractable vomiting without nausea, unspecified vomiting type            Nighat GRANDE MD  09/19/17 0634       Nighat GRANDE MD  09/19/17 0635

## 2017-09-17 NOTE — ED NOTES
PT AMBULATED TO RESTROOM WITHOUT DIFFICULTY TO OBTAIN URINE SAMPLE     Elba Zaragoza, TAHIR  09/16/17 4560

## 2017-09-18 ENCOUNTER — OFFICE VISIT (OUTPATIENT)
Dept: GASTROENTEROLOGY | Facility: CLINIC | Age: 22
End: 2017-09-18

## 2017-09-18 VITALS
HEIGHT: 67 IN | TEMPERATURE: 97.6 F | HEART RATE: 88 BPM | DIASTOLIC BLOOD PRESSURE: 76 MMHG | SYSTOLIC BLOOD PRESSURE: 124 MMHG | WEIGHT: 267 LBS | OXYGEN SATURATION: 100 % | BODY MASS INDEX: 41.91 KG/M2

## 2017-09-18 DIAGNOSIS — K92.0 HEMATEMESIS WITH NAUSEA: ICD-10-CM

## 2017-09-18 DIAGNOSIS — R10.13 EPIGASTRIC PAIN: Primary | ICD-10-CM

## 2017-09-18 DIAGNOSIS — K62.5 RECTAL BLEEDING: ICD-10-CM

## 2017-09-18 DIAGNOSIS — R19.7 DIARRHEA, UNSPECIFIED TYPE: ICD-10-CM

## 2017-09-18 PROCEDURE — 99204 OFFICE O/P NEW MOD 45 MIN: CPT | Performed by: NURSE PRACTITIONER

## 2017-09-18 RX ORDER — ONDANSETRON 4 MG/1
4 TABLET, FILM COATED ORAL EVERY 8 HOURS PRN
COMMUNITY
End: 2018-04-12

## 2017-09-18 NOTE — PATIENT INSTRUCTIONS
Gastroesophageal Reflux Disease, Adult    Gastroesophageal reflux disease (GERD) happens when acid from your stomach flows up into the esophagus. When acid comes in contact with the esophagus, the acid causes soreness (inflammation) in the esophagus. Over time, GERD may create small holes (ulcers) in the lining of the esophagus.  CAUSES    · Increased body weight. This puts pressure on the stomach, making acid rise from the stomach into the esophagus.  · Smoking. This increases acid production in the stomach.  · Drinking alcohol. This causes decreased pressure in the lower esophageal sphincter (valve or ring of muscle between the esophagus and stomach), allowing acid from the stomach into the esophagus.  · Late evening meals and a full stomach. This increases pressure and acid production in the stomach.  · A malformed lower esophageal sphincter.  Sometimes, no cause is found.  SYMPTOMS    · Burning pain in the lower part of the mid-chest behind the breastbone and in the mid-stomach area. This may occur twice a week or more often.  · Trouble swallowing.  · Sore throat.  · Dry cough.  · Asthma-like symptoms including chest tightness, shortness of breath, or wheezing.  DIAGNOSIS    Your caregiver may be able to diagnose GERD based on your symptoms. In some cases, X-rays and other tests may be done to check for complications or to check the condition of your stomach and esophagus.  TREATMENT    Your caregiver may recommend over-the-counter or prescription medicines to help decrease acid production. Ask your caregiver before starting or adding any new medicines.    HOME CARE INSTRUCTIONS    · Change the factors that you can control. Ask your caregiver for guidance concerning weight loss, quitting smoking, and alcohol consumption.  · Avoid foods and drinks that make your symptoms worse, such as:  ¨ Caffeine or alcoholic drinks.  ¨ Chocolate.  ¨ Peppermint or mint flavorings.  ¨ Garlic and onions.  ¨ Spicy foods.  ¨ Citrus  fruits, such as oranges, santy, or limes.  ¨ Tomato-based foods such as sauce, chili, salsa, and pizza.  ¨ Fried and fatty foods.  · Avoid lying down for the 3 hours prior to your bedtime or prior to taking a nap.  · Eat small, frequent meals instead of large meals.  · Wear loose-fitting clothing. Do not wear anything tight around your waist that causes pressure on your stomach.  · Raise the head of your bed 6 to 8 inches with wood blocks to help you sleep. Extra pillows will not help.  · Only take over-the-counter or prescription medicines for pain, discomfort, or fever as directed by your caregiver.  · Do not take aspirin, ibuprofen, or other nonsteroidal anti-inflammatory drugs (NSAIDs).  SEEK IMMEDIATE MEDICAL CARE IF:    · You have pain in your arms, neck, jaw, teeth, or back.  · Your pain increases or changes in intensity or duration.  · You develop nausea, vomiting, or sweating (diaphoresis).  · You develop shortness of breath, or you faint.  · Your vomit is green, yellow, black, or looks like coffee grounds or blood.  · Your stool is red, bloody, or black.  These symptoms could be signs of other problems, such as heart disease, gastric bleeding, or esophageal bleeding.  MAKE SURE YOU:    · Understand these instructions.  · Will watch your condition.  · Will get help right away if you are not doing well or get worse.     This information is not intended to replace advice given to you by your health care provider. Make sure you discuss any questions you have with your health care provider.     Document Released: 09/27/2006 Document Revised: 01/08/2016 Document Reviewed: 04/13/2016  [employer] Interactive Patient Education ©2016 [employer]

## 2017-09-18 NOTE — PROGRESS NOTES
"Chief Complaint   Patient presents with   • Abdominal Pain     having abdominal pain and anemia went to er saturday throwing up blood and blood in stool started out with bad acid reflux       Subjective     HPI      Hx of Fe Def Anemia since age of 16.  She has been taking fe supp x 18 months.  Pt has hx of heavy menstrual cycle.    Over past month increase in nausea.  Reports increase in heartburn after eating tomatoe based products (pizza, sauce).  Also reports she feels like \"throat sticks together.\"   Occasionally feels food becomes stuck in lower esophagus.  She becomes choked easily. This has been occurring over past month on regular basis.  Now over past month she has developed burning TRE pain regardless of what she eats or drinks.  She also experienced vomiting and observed bright red blood in emesis over past weekend.  She sought eval at [facility] for this.  She reports wretching night before eval at ER and day of eval at ER.  Zofran and liquid diet have helped emesis/nausea.      Persistent diarrhea x 2 days.  Stool described as liquid and will occur 3-4 times per day unrelated to eating.  Small amount of BRB observed in underwear on one occasion. This occurred after ER visit.  Normal bowel habit described as soft, formed stool, every 1-2 days.    Denies use of NSAIDs    Review of records: Seen by PCP 9/13/17 with c/o acid reflux.  Started on Protonix.  Evaluated at [facility] ER 9/16/2017 with c/o vomiting.  Labs were considered normal.  She was instructed to continue with Protonix in the morning and remain on clear liquid diet x 48 hr    No previous colon/endo.  No GI related family hx.    Lab Results   Component Value Date    HGB 12.5 09/16/2017    HGB 11.2 (L) 09/27/2016     Iron (10/2016) 37 (L)    Past Medical History:   Diagnosis Date   • Anxiety    • Depression        Past Surgical History:   Procedure Laterality Date   • TONSILLECTOMY         Outpatient Prescriptions Marked as Taking for the 9/18/17 encounter " (Office Visit) with DEREK Issa   Medication Sig Dispense Refill   • ALPRAZolam (XANAX) 0.5 MG tablet TAKE 1/2 TABLET THREE TIMES DAILY AS NEEDED 15 tablet 0   • calcium carbonate EX (TUMS EX) 750 MG chewable tablet Chew 1,500 mg 4 (Four) Times a Day As Needed for Indigestion or Heartburn.     • citalopram (CeleXA) 20 MG tablet TAKE ONE TABLET DAILY 30 tablet 5   • ferrous sulfate 325 (65 FE) MG tablet Take 325 mg by mouth daily with breakfast.     • ondansetron (ZOFRAN) 4 MG tablet Take 4 mg by mouth Every 8 (Eight) Hours As Needed for Nausea or Vomiting.     • pantoprazole (PROTONIX) 40 MG EC tablet Take 1 tablet by mouth Daily. 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[employer]     Social History Main Topics   • Smoking status: Former Smoker     Packs/day: 0.10     Types: Cigarettes     Start date: 9/27/2014     Quit date: 10/27/2016   • Smokeless tobacco: Never Used   • Alcohol use 0.6 oz/week     1 Glasses of wine per week      Comment: occ    • Drug use: No   • Sexual activity: Not Currently     Partners: Male     Birth control/ protection: Condom     Other Topics Concern   • Not on file     Social History Narrative       Family History   Problem Relation Age of Onset   • Colon cancer Neg Hx    • Esophageal cancer Neg Hx        Review of Systems   Constitutional: Negative for fatigue, fever and unexpected weight change.   HENT: Negative for hearing loss, sore throat and voice change.    Eyes: Negative for visual disturbance.   Respiratory: Negative for cough, shortness of breath and wheezing.    Cardiovascular: Negative for chest pain and palpitations.   Gastrointestinal: Positive for nausea and vomiting. Negative for abdominal pain and blood in stool.  "  Endocrine: Negative for polydipsia and polyuria.   Genitourinary: Negative for difficulty urinating, dysuria, hematuria and urgency.   Musculoskeletal: Negative for joint swelling and myalgias.   Skin: Negative for color change, rash and wound.   Neurological: Negative for dizziness, tremors, seizures and syncope.   Hematological: Does not bruise/bleed easily.   Psychiatric/Behavioral: Negative for agitation and confusion. The patient is not nervous/anxious.        Objective     Vitals:    09/18/17 0827   BP: 124/76   Pulse: 88   Temp: 97.6 °F (36.4 °C)   SpO2: 100%   Weight: 267 lb (121 kg)   Height: 67\" (170.2 cm)     Body mass index is 41.82 kg/(m^2).    Physical Exam   Constitutional: She is oriented to person, place, and time. She appears well-developed and well-nourished.   HENT:   Head: Normocephalic and atraumatic.   Eyes: Conjunctivae are normal. Pupils are equal, round, and reactive to light. No scleral icterus.   Neck: No JVD present. No thyroid mass and no thyromegaly present.   Cardiovascular: Normal rate, regular rhythm and normal heart sounds.  Exam reveals no gallop and no friction rub.    No murmur heard.  Pulmonary/Chest: Effort normal and breath sounds normal. No accessory muscle usage. No respiratory distress. She has no wheezes. She has no rales.   Abdominal: Soft. Bowel sounds are normal. She exhibits no distension, no ascites and no mass. There is no splenomegaly or hepatomegaly. There is no tenderness. There is no rebound and no guarding.   Genitourinary:   Genitourinary Comments: Rectal-Did not examine   Musculoskeletal: Normal range of motion. She exhibits no edema.   Neurological: She is alert and oriented to person, place, and time.   Deemed a reliable historian, able to converse without difficulty and able to move all extremities without difficulty   Skin: Skin is warm and dry.   Psychiatric: She has a normal mood and affect. Her behavior is normal.       Imaging Results (most recent)  "    None          Assessment/Plan     My was seen today for abdominal pain.    Diagnoses and all orders for this visit:    Epigastric pain    Hematemesis with nausea  -     Case Request; Standing  -     Implement Anesthesia Orders Day of Procedure; Standing  -     Obtain Informed Consent; Standing  -     Case Request    Diarrhea, unspecified type  -     Gastrointestinal Panel, PCR    Rectal bleeding  -     Case Request; Standing  -     Implement Anesthesia Orders Day of Procedure; Standing  -     Obtain Informed Consent; Standing  -     Verify bowel prep was successful; Standing  -     Give tap water enema if bowel prep was insufficient; Standing  -     Give Fleet's enema for intolerance of bowel prep; Standing  -     Case Request      COLONOSCOPY WITH ANESTHESIA (N/A), ESOPHAGOGASTRODUODENOSCOPY WITH ANESTHESIA (N/A)    Patient Instructions   Gastroesophageal Reflux Disease, Adult    Gastroesophageal reflux disease (GERD) happens when acid from your stomach flows up into the esophagus. When acid comes in contact with the esophagus, the acid causes soreness (inflammation) in the esophagus. Over time, GERD may create small holes (ulcers) in the lining of the esophagus.  CAUSES    · Increased body weight. This puts pressure on the stomach, making acid rise from the stomach into the esophagus.  · Smoking. This increases acid production in the stomach.  · Drinking alcohol. This causes decreased pressure in the lower esophageal sphincter (valve or ring of muscle between the esophagus and stomach), allowing acid from the stomach into the esophagus.  · Late evening meals and a full stomach. This increases pressure and acid production in the stomach.  · A malformed lower esophageal sphincter.  Sometimes, no cause is found.  SYMPTOMS    · Burning pain in the lower part of the mid-chest behind the breastbone and in the mid-stomach area. This may occur twice a week or more often.  · Trouble swallowing.  · Sore throat.  · Dry  cough.  · Asthma-like symptoms including chest tightness, shortness of breath, or wheezing.  DIAGNOSIS    Your caregiver may be able to diagnose GERD based on your symptoms. In some cases, X-rays and other tests may be done to check for complications or to check the condition of your stomach and esophagus.  TREATMENT    Your caregiver may recommend over-the-counter or prescription medicines to help decrease acid production. Ask your caregiver before starting or adding any new medicines.    HOME CARE INSTRUCTIONS    · Change the factors that you can control. Ask your caregiver for guidance concerning weight loss, quitting smoking, and alcohol consumption.  · Avoid foods and drinks that make your symptoms worse, such as:  ¨ Caffeine or alcoholic drinks.  ¨ Chocolate.  ¨ Peppermint or mint flavorings.  ¨ Garlic and onions.  ¨ Spicy foods.  ¨ Citrus fruits, such as oranges, santy, or limes.  ¨ Tomato-based foods such as sauce, chili, salsa, and pizza.  ¨ Fried and fatty foods.  · Avoid lying down for the 3 hours prior to your bedtime or prior to taking a nap.  · Eat small, frequent meals instead of large meals.  · Wear loose-fitting clothing. Do not wear anything tight around your waist that causes pressure on your stomach.  · Raise the head of your bed 6 to 8 inches with wood blocks to help you sleep. Extra pillows will not help.  · Only take over-the-counter or prescription medicines for pain, discomfort, or fever as directed by your caregiver.  · Do not take aspirin, ibuprofen, or other nonsteroidal anti-inflammatory drugs (NSAIDs).  SEEK IMMEDIATE MEDICAL CARE IF:    · You have pain in your arms, neck, jaw, teeth, or back.  · Your pain increases or changes in intensity or duration.  · You develop nausea, vomiting, or sweating (diaphoresis).  · You develop shortness of breath, or you faint.  · Your vomit is green, yellow, black, or looks like coffee grounds or blood.  · Your stool is red, bloody, or black.  These  symptoms could be signs of other problems, such as heart disease, gastric bleeding, or esophageal bleeding.  MAKE SURE YOU:    · Understand these instructions.  · Will watch your condition.  · Will get help right away if you are not doing well or get worse.     This information is not intended to replace advice given to you by your health care provider. Make sure you discuss any questions you have with your health care provider.     Document Released: 09/27/2006 Document Revised: 01/08/2016 Document Reviewed: 04/13/2016  [employer] Interactive Patient Education ©2016 [employer]

## 2017-09-19 PROBLEM — K62.5 RECTAL BLEEDING: Status: ACTIVE | Noted: 2017-09-19

## 2017-09-19 PROBLEM — K92.0 HEMATEMESIS WITH NAUSEA: Status: ACTIVE | Noted: 2017-09-19

## 2017-10-02 ENCOUNTER — HOSPITAL ENCOUNTER (OUTPATIENT)
Facility: HOSPITAL | Age: 22
Setting detail: HOSPITAL OUTPATIENT SURGERY
Discharge: HOME | End: 2017-10-02
Attending: INTERNAL MEDICINE | Admitting: INTERNAL MEDICINE

## 2017-10-02 ENCOUNTER — ANESTHESIA (OUTPATIENT)
Dept: GASTROENTEROLOGY | Facility: HOSPITAL | Age: 22
End: 2017-10-02

## 2017-10-02 ENCOUNTER — ANESTHESIA EVENT (OUTPATIENT)
Dept: GASTROENTEROLOGY | Facility: HOSPITAL | Age: 22
End: 2017-10-02

## 2017-10-02 VITALS
HEART RATE: 92 BPM | WEIGHT: 250 LBS | RESPIRATION RATE: 20 BRPM | SYSTOLIC BLOOD PRESSURE: 115 MMHG | HEIGHT: 67 IN | TEMPERATURE: 97.1 F | OXYGEN SATURATION: 99 % | BODY MASS INDEX: 39.24 KG/M2 | DIASTOLIC BLOOD PRESSURE: 72 MMHG

## 2017-10-02 DIAGNOSIS — K62.5 RECTAL BLEEDING: ICD-10-CM

## 2017-10-02 DIAGNOSIS — K92.0 HEMATEMESIS WITH NAUSEA: ICD-10-CM

## 2017-10-02 LAB — B-HCG UR QL: NEGATIVE

## 2017-10-02 PROCEDURE — 87081 CULTURE SCREEN ONLY: CPT | Performed by: INTERNAL MEDICINE

## 2017-10-02 PROCEDURE — 25010000002 PROPOFOL 10 MG/ML EMULSION: Performed by: NURSE ANESTHETIST, CERTIFIED REGISTERED

## 2017-10-02 PROCEDURE — 81025 URINE PREGNANCY TEST: CPT | Performed by: ANESTHESIOLOGY

## 2017-10-02 PROCEDURE — 45378 DIAGNOSTIC COLONOSCOPY: CPT | Performed by: INTERNAL MEDICINE

## 2017-10-02 PROCEDURE — 43239 EGD BIOPSY SINGLE/MULTIPLE: CPT | Performed by: INTERNAL MEDICINE

## 2017-10-02 RX ORDER — SODIUM CHLORIDE 9 MG/ML
500 INJECTION, SOLUTION INTRAVENOUS CONTINUOUS PRN
Status: DISCONTINUED | OUTPATIENT
Start: 2017-10-02 | End: 2017-10-02 | Stop reason: HOSPADM

## 2017-10-02 RX ORDER — LIDOCAINE HYDROCHLORIDE 20 MG/ML
INJECTION, SOLUTION INFILTRATION; PERINEURAL AS NEEDED
Status: DISCONTINUED | OUTPATIENT
Start: 2017-10-02 | End: 2017-10-02 | Stop reason: SURG

## 2017-10-02 RX ORDER — PROPOFOL 10 MG/ML
VIAL (ML) INTRAVENOUS AS NEEDED
Status: DISCONTINUED | OUTPATIENT
Start: 2017-10-02 | End: 2017-10-02 | Stop reason: SURG

## 2017-10-02 RX ORDER — SODIUM CHLORIDE 0.9 % (FLUSH) 0.9 %
3 SYRINGE (ML) INJECTION AS NEEDED
Status: DISCONTINUED | OUTPATIENT
Start: 2017-10-02 | End: 2017-10-02 | Stop reason: HOSPADM

## 2017-10-02 RX ADMIN — SODIUM CHLORIDE 500 ML: 9 INJECTION, SOLUTION INTRAVENOUS at 11:38

## 2017-10-02 RX ADMIN — LIDOCAINE HYDROCHLORIDE 50 MG: 20 INJECTION, SOLUTION INFILTRATION; PERINEURAL at 11:55

## 2017-10-02 RX ADMIN — LIDOCAINE HYDROCHLORIDE 0.5 ML: 10 INJECTION, SOLUTION EPIDURAL; INFILTRATION; INTRACAUDAL; PERINEURAL at 11:38

## 2017-10-02 RX ADMIN — PROPOFOL 400 MG: 10 INJECTION, EMULSION INTRAVENOUS at 11:55

## 2017-10-02 NOTE — PLAN OF CARE
Problem: Patient Care Overview (Adult)  Goal: Plan of Care Review  Outcome: Ongoing (interventions implemented as appropriate)    10/02/17 1204   Patient Care Overview   Progress improving   Outcome Evaluation   Outcome Summary/Follow up Plan no noted problems

## 2017-10-02 NOTE — PLAN OF CARE
Problem: GI Endoscopy (Adult)  Goal: Signs and Symptoms of Listed Potential Problems Will be Absent or Manageable (GI Endoscopy)  Outcome: Outcome(s) achieved Date Met:  10/02/17

## 2017-10-02 NOTE — H&P (VIEW-ONLY)
"Chief Complaint   Patient presents with   • Abdominal Pain     having abdominal pain and anemia went to er saturday throwing up blood and blood in stool started out with bad acid reflux       Subjective     HPI      Hx of Fe Def Anemia since age of 16.  She has been taking fe supp x 18 months.  Pt has hx of heavy menstrual cycle.    Over past month increase in nausea.  Reports increase in heartburn after eating tomatoe based products (pizza, sauce).  Also reports she feels like \"throat sticks together.\"   Occasionally feels food becomes stuck in lower esophagus.  She becomes choked easily. This has been occurring over past month on regular basis.  Now over past month she has developed burning TRE pain regardless of what she eats or drinks.  She also experienced vomiting and observed bright red blood in emesis over past weekend.  She sought eval at [facility] for this.  She reports wretching night before eval at ER and day of eval at ER.  Zofran and liquid diet have helped emesis/nausea.      Persistent diarrhea x 2 days.  Stool described as liquid and will occur 3-4 times per day unrelated to eating.  Small amount of BRB observed in underwear on one occasion. This occurred after ER visit.  Normal bowel habit described as soft, formed stool, every 1-2 days.    Denies use of NSAIDs    Review of records: Seen by PCP 9/13/17 with c/o acid reflux.  Started on Protonix.  Evaluated at [facility] ER 9/16/2017 with c/o vomiting.  Labs were considered normal.  She was instructed to continue with Protonix in the morning and remain on clear liquid diet x 48 hr    No previous colon/endo.  No GI related family hx.    Lab Results   Component Value Date    HGB 12.5 09/16/2017    HGB 11.2 (L) 09/27/2016     Iron (10/2016) 37 (L)    Past Medical History:   Diagnosis Date   • Anxiety    • Depression        Past Surgical History:   Procedure Laterality Date   • TONSILLECTOMY         Outpatient Prescriptions Marked as Taking for the 9/18/17 encounter " (Office Visit) with DEREK Issa   Medication Sig Dispense Refill   • ALPRAZolam (XANAX) 0.5 MG tablet TAKE 1/2 TABLET THREE TIMES DAILY AS NEEDED 15 tablet 0   • calcium carbonate EX (TUMS EX) 750 MG chewable tablet Chew 1,500 mg 4 (Four) Times a Day As Needed for Indigestion or Heartburn.     • citalopram (CeleXA) 20 MG tablet TAKE ONE TABLET DAILY 30 tablet 5   • ferrous sulfate 325 (65 FE) MG tablet Take 325 mg by mouth daily with breakfast.     • ondansetron (ZOFRAN) 4 MG tablet Take 4 mg by mouth Every 8 (Eight) Hours As Needed for Nausea or Vomiting.     • pantoprazole (PROTONIX) 40 MG EC tablet Take 1 tablet by mouth Daily. 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[employer]     Social History Main Topics   • Smoking status: Former Smoker     Packs/day: 0.10     Types: Cigarettes     Start date: 9/27/2014     Quit date: 10/27/2016   • Smokeless tobacco: Never Used   • Alcohol use 0.6 oz/week     1 Glasses of wine per week      Comment: occ    • Drug use: No   • Sexual activity: Not Currently     Partners: Male     Birth control/ protection: Condom     Other Topics Concern   • Not on file     Social History Narrative       Family History   Problem Relation Age of Onset   • Colon cancer Neg Hx    • Esophageal cancer Neg Hx        Review of Systems   Constitutional: Negative for fatigue, fever and unexpected weight change.   HENT: Negative for hearing loss, sore throat and voice change.    Eyes: Negative for visual disturbance.   Respiratory: Negative for cough, shortness of breath and wheezing.    Cardiovascular: Negative for chest pain and palpitations.   Gastrointestinal: Positive for nausea and vomiting. Negative for abdominal pain and blood in stool.   "  Endocrine: Negative for polydipsia and polyuria.   Genitourinary: Negative for difficulty urinating, dysuria, hematuria and urgency.   Musculoskeletal: Negative for joint swelling and myalgias.   Skin: Negative for color change, rash and wound.   Neurological: Negative for dizziness, tremors, seizures and syncope.   Hematological: Does not bruise/bleed easily.   Psychiatric/Behavioral: Negative for agitation and confusion. The patient is not nervous/anxious.        Objective     Vitals:    09/18/17 0827   BP: 124/76   Pulse: 88   Temp: 97.6 °F (36.4 °C)   SpO2: 100%   Weight: 267 lb (121 kg)   Height: 67\" (170.2 cm)     Body mass index is 41.82 kg/(m^2).    Physical Exam   Constitutional: She is oriented to person, place, and time. She appears well-developed and well-nourished.   HENT:   Head: Normocephalic and atraumatic.   Eyes: Conjunctivae are normal. Pupils are equal, round, and reactive to light. No scleral icterus.   Neck: No JVD present. No thyroid mass and no thyromegaly present.   Cardiovascular: Normal rate, regular rhythm and normal heart sounds.  Exam reveals no gallop and no friction rub.    No murmur heard.  Pulmonary/Chest: Effort normal and breath sounds normal. No accessory muscle usage. No respiratory distress. She has no wheezes. She has no rales.   Abdominal: Soft. Bowel sounds are normal. She exhibits no distension, no ascites and no mass. There is no splenomegaly or hepatomegaly. There is no tenderness. There is no rebound and no guarding.   Genitourinary:   Genitourinary Comments: Rectal-Did not examine   Musculoskeletal: Normal range of motion. She exhibits no edema.   Neurological: She is alert and oriented to person, place, and time.   Deemed a reliable historian, able to converse without difficulty and able to move all extremities without difficulty   Skin: Skin is warm and dry.   Psychiatric: She has a normal mood and affect. Her behavior is normal.       Imaging Results (most recent)  "    None          Assessment/Plan     My was seen today for abdominal pain.    Diagnoses and all orders for this visit:    Epigastric pain    Hematemesis with nausea  -     Case Request; Standing  -     Implement Anesthesia Orders Day of Procedure; Standing  -     Obtain Informed Consent; Standing  -     Case Request    Diarrhea, unspecified type  -     Gastrointestinal Panel, PCR    Rectal bleeding  -     Case Request; Standing  -     Implement Anesthesia Orders Day of Procedure; Standing  -     Obtain Informed Consent; Standing  -     Verify bowel prep was successful; Standing  -     Give tap water enema if bowel prep was insufficient; Standing  -     Give Fleet's enema for intolerance of bowel prep; Standing  -     Case Request      COLONOSCOPY WITH ANESTHESIA (N/A), ESOPHAGOGASTRODUODENOSCOPY WITH ANESTHESIA (N/A)    Patient Instructions   Gastroesophageal Reflux Disease, Adult    Gastroesophageal reflux disease (GERD) happens when acid from your stomach flows up into the esophagus. When acid comes in contact with the esophagus, the acid causes soreness (inflammation) in the esophagus. Over time, GERD may create small holes (ulcers) in the lining of the esophagus.  CAUSES    · Increased body weight. This puts pressure on the stomach, making acid rise from the stomach into the esophagus.  · Smoking. This increases acid production in the stomach.  · Drinking alcohol. This causes decreased pressure in the lower esophageal sphincter (valve or ring of muscle between the esophagus and stomach), allowing acid from the stomach into the esophagus.  · Late evening meals and a full stomach. This increases pressure and acid production in the stomach.  · A malformed lower esophageal sphincter.  Sometimes, no cause is found.  SYMPTOMS    · Burning pain in the lower part of the mid-chest behind the breastbone and in the mid-stomach area. This may occur twice a week or more often.  · Trouble swallowing.  · Sore throat.  · Dry  cough.  · Asthma-like symptoms including chest tightness, shortness of breath, or wheezing.  DIAGNOSIS    Your caregiver may be able to diagnose GERD based on your symptoms. In some cases, X-rays and other tests may be done to check for complications or to check the condition of your stomach and esophagus.  TREATMENT    Your caregiver may recommend over-the-counter or prescription medicines to help decrease acid production. Ask your caregiver before starting or adding any new medicines.    HOME CARE INSTRUCTIONS    · Change the factors that you can control. Ask your caregiver for guidance concerning weight loss, quitting smoking, and alcohol consumption.  · Avoid foods and drinks that make your symptoms worse, such as:  ¨ Caffeine or alcoholic drinks.  ¨ Chocolate.  ¨ Peppermint or mint flavorings.  ¨ Garlic and onions.  ¨ Spicy foods.  ¨ Citrus fruits, such as oranges, santy, or limes.  ¨ Tomato-based foods such as sauce, chili, salsa, and pizza.  ¨ Fried and fatty foods.  · Avoid lying down for the 3 hours prior to your bedtime or prior to taking a nap.  · Eat small, frequent meals instead of large meals.  · Wear loose-fitting clothing. Do not wear anything tight around your waist that causes pressure on your stomach.  · Raise the head of your bed 6 to 8 inches with wood blocks to help you sleep. Extra pillows will not help.  · Only take over-the-counter or prescription medicines for pain, discomfort, or fever as directed by your caregiver.  · Do not take aspirin, ibuprofen, or other nonsteroidal anti-inflammatory drugs (NSAIDs).  SEEK IMMEDIATE MEDICAL CARE IF:    · You have pain in your arms, neck, jaw, teeth, or back.  · Your pain increases or changes in intensity or duration.  · You develop nausea, vomiting, or sweating (diaphoresis).  · You develop shortness of breath, or you faint.  · Your vomit is green, yellow, black, or looks like coffee grounds or blood.  · Your stool is red, bloody, or black.  These  symptoms could be signs of other problems, such as heart disease, gastric bleeding, or esophageal bleeding.  MAKE SURE YOU:    · Understand these instructions.  · Will watch your condition.  · Will get help right away if you are not doing well or get worse.     This information is not intended to replace advice given to you by your health care provider. Make sure you discuss any questions you have with your health care provider.     Document Released: 09/27/2006 Document Revised: 01/08/2016 Document Reviewed: 04/13/2016  [employer] Interactive Patient Education ©2016 [employer]

## 2017-10-02 NOTE — PLAN OF CARE
Problem: Patient Care Overview (Adult)  Goal: Plan of Care Review  Outcome: Outcome(s) achieved Date Met:  10/02/17    10/02/17 1223   Patient Care Overview   Progress improving   Outcome Evaluation   Outcome Summary/Follow up Plan D/C CRITERIA MET   Coping/Psychosocial Response Interventions   Plan Of Care Reviewed With patient;mother

## 2017-10-02 NOTE — ANESTHESIA PREPROCEDURE EVALUATION
Anesthesia Evaluation     Patient summary reviewed and Nursing notes reviewed   no history of anesthetic complications:  NPO Solid Status: > 8 hours  NPO Liquid Status: > 2 hours     Airway   Mallampati: I  TM distance: <3 FB  Neck ROM: full  no difficulty expected  Dental - normal exam     Pulmonary - normal exam   (+) a smoker Former, asthma (as child),   Cardiovascular - normal exam  Exercise tolerance: excellent (>7 METS)    (-) hypertension, past MI, CAD      Neuro/Psych  (+) psychiatric history Anxiety and Depression,    (-) seizures, TIA, CVA  GI/Hepatic/Renal/Endo    (+) obesity,  GERD,   (-) liver disease, no renal disease, diabetes    ROS Comment: Hematemesis, heme + stool    Musculoskeletal     Abdominal  - normal exam    Bowel sounds: normal.   Substance History      OB/GYN          Other                                        Anesthesia Plan    ASA 2     general     intravenous induction   Anesthetic plan and risks discussed with patient.

## 2017-10-02 NOTE — ANESTHESIA POSTPROCEDURE EVALUATION
Patient: My Chavez    Procedure Summary     Date Anesthesia Start Anesthesia Stop Room / Location    10/02/17 1152 1214  [facility] / [facility]       Procedure Diagnosis Surgeon Provider    COLONOSCOPY WITH ANESTHESIA (N/A ); ESOPHAGOGASTRODUODENOSCOPY WITH ANESTHESIA (N/A Esophagus) Rectal bleeding; Hematemesis with nausea  (Rectal bleeding [K62.5]; Hematemesis with nausea [K92.0, R11.0]) Manoj Madden, DO Malcom Camargo, CRNA          Anesthesia Type: general  Last vitals  BP   153/73 (10/02/17 1117)    Temp   97.1 °F (36.2 °C) (10/02/17 1117)    Pulse   68 (10/02/17 1117)   Resp   20 (10/02/17 1117)    SpO2   100 % (10/02/17 1117)      Post Anesthesia Care and Evaluation    Patient location during evaluation: PHASE II  Patient participation: complete - patient participated  Level of consciousness: awake  Pain management: adequate  Airway patency: patent  Anesthetic complications: No anesthetic complications  Respiratory status: acceptable  Hydration status: acceptable

## 2017-10-03 LAB — UREASE TISS QL: NEGATIVE

## 2017-11-22 ENCOUNTER — OFFICE VISIT (OUTPATIENT)
Dept: FAMILY MEDICINE CLINIC | Facility: CLINIC | Age: 22
End: 2017-11-22

## 2017-11-22 VITALS
OXYGEN SATURATION: 98 % | TEMPERATURE: 98.4 F | WEIGHT: 265 LBS | HEART RATE: 102 BPM | HEIGHT: 67 IN | BODY MASS INDEX: 41.59 KG/M2 | SYSTOLIC BLOOD PRESSURE: 120 MMHG | DIASTOLIC BLOOD PRESSURE: 68 MMHG | RESPIRATION RATE: 16 BRPM

## 2017-11-22 DIAGNOSIS — E61.1 IRON DEFICIENCY: ICD-10-CM

## 2017-11-22 DIAGNOSIS — E66.9 OBESITY, UNSPECIFIED CLASSIFICATION, UNSPECIFIED OBESITY TYPE, UNSPECIFIED WHETHER SERIOUS COMORBIDITY PRESENT: Chronic | ICD-10-CM

## 2017-11-22 DIAGNOSIS — J30.89 NON-SEASONAL ALLERGIC RHINITIS, UNSPECIFIED CHRONICITY, UNSPECIFIED TRIGGER: Chronic | ICD-10-CM

## 2017-11-22 DIAGNOSIS — F32.A DEPRESSION, UNSPECIFIED DEPRESSION TYPE: Chronic | ICD-10-CM

## 2017-11-22 DIAGNOSIS — D64.9 ANEMIA, UNSPECIFIED TYPE: ICD-10-CM

## 2017-11-22 DIAGNOSIS — K21.9 GASTROESOPHAGEAL REFLUX DISEASE, ESOPHAGITIS PRESENCE NOT SPECIFIED: ICD-10-CM

## 2017-11-22 DIAGNOSIS — J06.9 ACUTE URI: Primary | ICD-10-CM

## 2017-11-22 DIAGNOSIS — F41.9 ANXIETY: Chronic | ICD-10-CM

## 2017-11-22 PROBLEM — K90.49 FOOD INTOLERANCE: Status: ACTIVE | Noted: 2017-11-22

## 2017-11-22 PROCEDURE — 99213 OFFICE O/P EST LOW 20 MIN: CPT | Performed by: FAMILY MEDICINE

## 2017-11-22 RX ORDER — FLUTICASONE PROPIONATE 50 MCG
2 SPRAY, SUSPENSION (ML) NASAL DAILY
Qty: 1 BOTTLE | Refills: 1 | Status: SHIPPED | OUTPATIENT
Start: 2017-11-22 | End: 2018-04-12

## 2017-11-22 NOTE — PROGRESS NOTES
Subjective   My Chavez is a 22 y.o. female presenting with chief complaint of:   Chief Complaint   Patient presents with   • Follow-up     GERD   • Illness     Runny nose, dry eyes, sore throat.       History of Present Illness :  unaccompanied.  Here for primarily a/an Chronic issue today.   Has has  multiple chronic problems to consider, is here for an interval appointment.  Has anxiety/depression for years on/off.     Other chronic problem/s to consider:   Anemia/iron deficiency: This has been present for years/over a year.  It is chronic.  These has been GI evaulation in the past. There is no current melena, hematochezia. It has been benign to date and stable/watching; menses is heavy.  Anxiety: This has been present for years/over a year.  It is chronic.  It is variable.  It is associated with stressors: work/home.  Medications being used help. Medications/Rx change not requested.  Depression: This has been present for years/over a year.  It is chronic.  It is variable.  It is associated with stressors: work/home.  Medications being used help. Medications/Rx change not requested. No suicide ideation/intent.   Allergic rhinitis:  This has been present for years/over a year.  The nasal/sinus congestion on/off has been present for years and is currently stable/ same as usual.  Medications used on/off. Medications used daily.  GE reflux/heartburn: This has been present for years/over a year.  It is a chronic condition.   It is stable as there is no change in infrequent heartburn and no dysphagia.  Medication required to control symptoms. Protonix treated.   Obesity/overweight: This has been present for years/over a year.  It is chronic.     It is stable; there has been no recent major change in weight, or dieting  Associated illnesses noted that are affected by this illness. She is interested in Rx    Has an/another acute issue today: has had/getting over nasal congestion, sore throat, cough; URI.    The  following portions of the patient's history were reviewed and updated as appropriate: allergies, current medications, past family history, past medical history, past social history, past surgical history and problem list.  Records acquired and reviewed; TCC migrated.      Current Outpatient Prescriptions:   •  ALPRAZolam (XANAX) 0.5 MG tablet, TAKE 1/2 TABLET THREE TIMES DAILY AS NEEDED, Disp: 15 tablet, Rfl: 0  •  calcium carbonate EX (TUMS EX) 750 MG chewable tablet, Chew 1,500 mg 4 (Four) Times a Day As Needed for Indigestion or Heartburn., Disp: , Rfl:   •  citalopram (CeleXA) 20 MG tablet, TAKE ONE TABLET DAILY, Disp: 30 tablet, Rfl: 5  •  ferrous sulfate 325 (65 FE) MG tablet, Take 325 mg by mouth daily with breakfast., Disp: , Rfl:   •  ondansetron (ZOFRAN) 4 MG tablet, Take 4 mg by mouth Every 8 (Eight) Hours As Needed for Nausea or Vomiting., Disp: , Rfl:   •  pantoprazole (PROTONIX) 40 MG EC tablet, Take 1 tablet by mouth Daily., Disp: 30 tablet, Rfl: 2    Allergies   Allergen Reactions   • Contrave [Naltrexone-Bupropion Hcl Er]      Nausea         Review of Systems  GENERAL:  Active home/work. Sleep is ok. No fever now.  ENDO:  No syncope, near or diaphoretic sweaty spell.  HEENT: No head injury or headache,  No vision change, No significant hearing loss.  Ears without pain/drainage.  No sore throat.  No  significant nasal/sinus congestion/drainage. No epistaxis.  CHEST: No chest wall tenderness or mass. No cough,  without wheeze.  No SOB; no hemoptysis.  CV: No chest pain, palpitations, ankle edema.  GI: No heartburn, dysphagia.  No abdominal pain, diarrhea, constipation.  No rectal bleeding, or melena.    :  Voids without dysuria, or incontinence to completion.  ORTHO: No painful/swollen joints but various on /off sore.  No sore neck or back.  No acute neck or back pain without recent injury.   NEURO: No dizziness, weakness of extremities.  No numbness/paresthesias.   PSYCH: No memory loss.  Mood  good; on/off anxious, depressed but/and not suicidal.  Tries to tolerate stress .     Results for orders placed or performed in visit on 11/22/17   Iron Profile   Result Value Ref Range    TIBC 424 (H) 225 - 420 mcg/dL    UIBC 390 mcg/dL    Iron 34 (L) 42 - 180 mcg/dL    Iron Saturation 8 (L) 20 - 45 %   CBC & Differential   Result Value Ref Range    WBC 8.15 4.80 - 10.80 10*3/mm3    RBC 4.81 4.20 - 5.40 10*6/mm3    Hemoglobin 11.9 (L) 12.0 - 16.0 g/dL    Hematocrit 38.2 37.0 - 47.0 %    MCV 79.4 (L) 82.0 - 98.0 fL    MCH 24.7 (L) 28.0 - 32.0 pg    MCHC 31.2 (L) 33.0 - 36.0 g/dL    RDW 15.1 (H) 12.0 - 15.0 %    Platelets 303 130 - 400 10*3/mm3    Neutrophil Rel % 59.8 39.0 - 78.0 %    Lymphocyte Rel % 27.0 15.0 - 45.0 %    Monocyte Rel % 6.7 4.0 - 12.0 %    Eosinophil Rel % 5.6 (H) 0.0 - 4.0 %    Basophil Rel % 0.7 0.0 - 2.0 %    Neutrophils Absolute 4.86 1.87 - 8.40 10*3/mm3    Lymphocytes Absolute 2.20 0.72 - 4.86 10*3/mm3    Monocytes Absolute 0.55 0.19 - 1.30 10*3/mm3    Eosinophils Absolute 0.46 0.00 - 0.70 10*3/mm3    Basophils Absolute 0.06 0.00 - 0.20 10*3/mm3    Immature Granulocyte Rel % 0.2 0.0 - 5.0 %    Immature Grans Absolute 0.02 0.00 - 0.03 10*3/mm3       No results found for: HGBA1C    Lab Results   Component Value Date     09/16/2017     09/27/2016    K 4.3 09/16/2017    K 4.3 09/27/2016     09/16/2017     09/27/2016    CO2 22.0 (L) 09/16/2017    CO2 23.0 (L) 09/27/2016    GLUCOSE 90 09/16/2017    BUN 14 09/16/2017    BUN 11 09/27/2016    CREATININE 0.68 09/16/2017    CREATININE 0.59 09/27/2016    CALCIUM 9.5 09/16/2017    CALCIUM 9.1 09/27/2016       No results found for: PSA     Lab Results:  CBC:    Lab Results - Last 18 Months  Lab Units 11/22/17 0920 09/16/17 2128 09/27/16  1348   WBC 10*3/mm3 8.15 8.87 7.25   HEMATOCRIT % 38.2 38.2 35.7*     CMP:    Lab Results - Last 18 Months  Lab Units 09/16/17 2128 09/27/16  1348   SODIUM mmol/L 141 140   CHLORIDE mmol/L 108 105  "  CO2 mmol/L 22.0*  --    TOTAL CO2, ARTERIAL mmol/L  --  23.0*   BUN mg/dL 14 11   CREATININE mg/dL 0.68 0.59   EGFR IF NONAFRICN AM mL/min/1.73 108 129   EGFR IF AFRICN AM mL/min/1.73  --  >150   CALCIUM mg/dL 9.5 9.1     THYROID:    Lab Results - Last 18 Months  Lab Units 09/27/16  1348   TSH mIU/mL 1.500     A1C:No results for input(s): HGBA1C in the last 73008 hours.    Objective   /68  Pulse 102  Temp 98.4 °F (36.9 °C)  Resp 16  Ht 67\" (170.2 cm)  Wt 265 lb (120 kg)  SpO2 98%  BMI 41.5 kg/m2    Physical Exam  GENERAL:  Well nourished/developed in no acute distress. Obese   SKIN: Turgor excellent, without wound, rash, lesion.  HEENT: Normal cephalic without trauma.  Pupils equal round reactive to light. Extraocular motions full without nystagmus.   External canals nonobstructive nontender without reddness. Tymphatic membranes raman with cheikh structures intact.   Oral cavity without growths, exudates, and moist.  Posterior pharynx without mass, obstruction, redness.  No thyromegaly, mass, tenderness, lymphadenopathy and supple.  CV: Regular rhythm.  No murmur, gallop,  edema. Posterior pulses intact.  No carotid bruits.  CHEST: No chest wall tenderness or mass.   LUNGS: Symmetric motion with clear to auscultation.  No dullness to percussion  ABD: Soft, nontender without mass.   ORTHO: Symmetric extremities without swelling/point tenderness.  Full gross range of motion.  NEURO: CN 2-12 grossly intact.  Symmetric facies. 1/4 x bicep knee equal reflexes.  UE/LE   4/5 strength throughout.  Nonfocal use extremities. Speech clear.  Intact light touch with monofilament, vibratory sensation with tuning fork; equal toes/distal feet.    PSYCH: Oriented x 3.  Pleasant calm, well kept.  urposeful/directed conservation with intact short/long gross memory.     Assessment/Plan     1. Acute URI    2. Non-seasonal allergic rhinitis, unspecified chronicity, unspecified trigger    3. Anemia, unspecified type    4. " Gastroesophageal reflux disease, esophagitis presence not specified    5. Iron deficiency    6. Depression, unspecified depression type    7. Anxiety    8. Obesity, unspecified classification, unspecified obesity type, unspecified whether serious comorbidity present        Rx: reviewed/changes:  OTC cough/cold ok    LAB: reviewed/orders:   Orders Placed This Encounter   Procedures   • Iron Profile   • CBC & Differential   12m CBC, CMP, LIPID, TSH, fT4    Discussions:   Weight loss strageties    Patient Instructions   OTC decongestant    Price Qysmia, Belviq      Follow up: Return for lab today;, lab during/or just before next apt;, Dr Nuñez-, 6 m;.  Future Appointments  Date Time Provider Department Center   5/31/2018 1:00 PM Mak Nuñez MD [facility] None

## 2017-11-23 LAB
BASOPHILS # BLD AUTO: 0.06 10*3/MM3 (ref 0–0.2)
BASOPHILS NFR BLD AUTO: 0.7 % (ref 0–2)
EOSINOPHIL # BLD AUTO: 0.46 10*3/MM3 (ref 0–0.7)
EOSINOPHIL NFR BLD AUTO: 5.6 % (ref 0–4)
ERYTHROCYTE [DISTWIDTH] IN BLOOD BY AUTOMATED COUNT: 15.1 % (ref 12–15)
HCT VFR BLD AUTO: 38.2 % (ref 37–47)
HGB BLD-MCNC: 11.9 G/DL (ref 12–16)
IMM GRANULOCYTES # BLD: 0.02 10*3/MM3 (ref 0–0.03)
IMM GRANULOCYTES NFR BLD: 0.2 % (ref 0–5)
IRON SATN MFR SERPL: 8 % (ref 20–45)
IRON SERPL-MCNC: 34 MCG/DL (ref 42–180)
LYMPHOCYTES # BLD AUTO: 2.2 10*3/MM3 (ref 0.72–4.86)
LYMPHOCYTES NFR BLD AUTO: 27 % (ref 15–45)
MCH RBC QN AUTO: 24.7 PG (ref 28–32)
MCHC RBC AUTO-ENTMCNC: 31.2 G/DL (ref 33–36)
MCV RBC AUTO: 79.4 FL (ref 82–98)
MONOCYTES # BLD AUTO: 0.55 10*3/MM3 (ref 0.19–1.3)
MONOCYTES NFR BLD AUTO: 6.7 % (ref 4–12)
NEUTROPHILS # BLD AUTO: 4.86 10*3/MM3 (ref 1.87–8.4)
NEUTROPHILS NFR BLD AUTO: 59.8 % (ref 39–78)
PLATELET # BLD AUTO: 303 10*3/MM3 (ref 130–400)
RBC # BLD AUTO: 4.81 10*6/MM3 (ref 4.2–5.4)
TIBC SERPL-MCNC: 424 MCG/DL (ref 225–420)
UIBC SERPL-MCNC: 390 MCG/DL
WBC # BLD AUTO: 8.15 10*3/MM3 (ref 4.8–10.8)

## 2017-12-07 RX ORDER — ALPRAZOLAM 0.5 MG/1
TABLET ORAL
Qty: 15 TABLET | Refills: 0 | OUTPATIENT
Start: 2017-12-07 | End: 2018-03-15 | Stop reason: SDUPTHER

## 2017-12-27 DIAGNOSIS — E61.1 IRON DEFICIENCY: ICD-10-CM

## 2017-12-27 DIAGNOSIS — D64.9 ANEMIA, UNSPECIFIED TYPE: Primary | ICD-10-CM

## 2017-12-28 ENCOUNTER — TELEPHONE (OUTPATIENT)
Dept: FAMILY MEDICINE CLINIC | Facility: CLINIC | Age: 22
End: 2017-12-28

## 2017-12-28 NOTE — TELEPHONE ENCOUNTER
Stable; will take alittle longer to get built back up  Stay on iron  1m CBC, iron    Pt informed of this

## 2017-12-28 NOTE — TELEPHONE ENCOUNTER
----- Message from Mak Nuñez MD sent at 12/28/2017 12:40 PM CST -----      ----- Message -----     From: Antolin Molina Rep     Sent: 12/28/2017  12:32 PM       To: Mak Nuñez MD

## 2018-03-15 DIAGNOSIS — D64.9 ANEMIA, UNSPECIFIED TYPE: ICD-10-CM

## 2018-03-15 DIAGNOSIS — E66.9 OBESITY, UNSPECIFIED OBESITY SEVERITY, UNSPECIFIED OBESITY TYPE: Chronic | ICD-10-CM

## 2018-03-15 RX ORDER — CITALOPRAM 20 MG/1
TABLET ORAL
Qty: 30 TABLET | Refills: 5 | Status: SHIPPED | OUTPATIENT
Start: 2018-03-15 | End: 2018-06-28 | Stop reason: SDUPTHER

## 2018-03-15 RX ORDER — ALPRAZOLAM 0.5 MG/1
TABLET ORAL
Qty: 15 TABLET | Refills: 0 | OUTPATIENT
Start: 2018-03-15 | End: 2018-04-30 | Stop reason: SDUPTHER

## 2018-03-15 RX ORDER — PANTOPRAZOLE SODIUM 40 MG/1
TABLET, DELAYED RELEASE ORAL
Qty: 30 TABLET | Refills: 5 | Status: SHIPPED | OUTPATIENT
Start: 2018-03-15 | End: 2018-06-28 | Stop reason: SDUPTHER

## 2018-04-02 ENCOUNTER — OFFICE VISIT (OUTPATIENT)
Dept: FAMILY MEDICINE CLINIC | Facility: CLINIC | Age: 23
End: 2018-04-02

## 2018-04-02 VITALS
DIASTOLIC BLOOD PRESSURE: 80 MMHG | TEMPERATURE: 98.5 F | BODY MASS INDEX: 43.47 KG/M2 | HEIGHT: 67 IN | RESPIRATION RATE: 18 BRPM | OXYGEN SATURATION: 99 % | WEIGHT: 277 LBS | SYSTOLIC BLOOD PRESSURE: 132 MMHG | HEART RATE: 96 BPM

## 2018-04-02 DIAGNOSIS — R10.11 RIGHT UPPER QUADRANT ABDOMINAL PAIN: ICD-10-CM

## 2018-04-02 DIAGNOSIS — K21.9 GASTROESOPHAGEAL REFLUX DISEASE, ESOPHAGITIS PRESENCE NOT SPECIFIED: Primary | ICD-10-CM

## 2018-04-02 DIAGNOSIS — E66.9 OBESITY, UNSPECIFIED CLASSIFICATION, UNSPECIFIED OBESITY TYPE, UNSPECIFIED WHETHER SERIOUS COMORBIDITY PRESENT: Chronic | ICD-10-CM

## 2018-04-02 PROCEDURE — 99213 OFFICE O/P EST LOW 20 MIN: CPT | Performed by: FAMILY MEDICINE

## 2018-04-03 ENCOUNTER — TELEPHONE (OUTPATIENT)
Dept: FAMILY MEDICINE CLINIC | Facility: CLINIC | Age: 23
End: 2018-04-03

## 2018-04-03 DIAGNOSIS — R10.11 RIGHT UPPER QUADRANT ABDOMINAL PAIN: ICD-10-CM

## 2018-04-03 DIAGNOSIS — R10.11 RUQ PAIN: Primary | ICD-10-CM

## 2018-04-03 DIAGNOSIS — K21.9 GASTROESOPHAGEAL REFLUX DISEASE, ESOPHAGITIS PRESENCE NOT SPECIFIED: ICD-10-CM

## 2018-04-03 NOTE — TELEPHONE ENCOUNTER
----- Message from Mak Nuñez MD sent at 4/3/2018  2:30 PM CDT -----  Got to get now nuclear scan-if still hurting; soon so who ([facility], ) sherly the quickest  AND if she ends up with a surgeon; does she have one in mind?

## 2018-04-03 NOTE — TELEPHONE ENCOUNTER
Will have to get precert and have to have order to start. Also, as for the surgeon, patient doesn't care who.

## 2018-04-03 NOTE — PROGRESS NOTES
Subjective   My Chavez is a 22 y.o. female presenting with chief complaint of:   Chief Complaint   Patient presents with   • Abdominal Pain   • Vomiting   • Nausea       History of Present Illness :  Alone.  Here for primarily an acute issue today; pain RUQ.   Has multiple chronic problems to consider that might have a bearing on today's issues; not an interval appointment.       1. Gastroesophageal reflux disease, esophagitis presence not specified: chronic/for years.   The problem variable with still on/off heartburn.       2. Right upper quadrant abdominal pain ; worse eating and hurts around RUQ       Has an/another acute issue today: none.    The following portions of the patient's history were reviewed and updated as appropriate: allergies, current medications, past family history, past medical history, past social history, past surgical history and problem list.  Records acquired and reviewed; TCC migrated.      Current Outpatient Prescriptions:   •  ALPRAZolam (XANAX) 0.5 MG tablet, TAKE 1/2 TABLET THREE TIMES DAILY AS NEEDED, Disp: 15 tablet, Rfl: 0  •  calcium carbonate EX (TUMS EX) 750 MG chewable tablet, Chew 1,500 mg 4 (Four) Times a Day As Needed for Indigestion or Heartburn., Disp: , Rfl:   •  citalopram (CeleXA) 20 MG tablet, TAKE ONE TABLET DAILY, Disp: 30 tablet, Rfl: 5  •  ferrous sulfate 325 (65 FE) MG tablet, Take 325 mg by mouth daily with breakfast., Disp: , Rfl:   •  pantoprazole (PROTONIX) 40 MG EC tablet, TAKE ONE TABLET DAILY, Disp: 30 tablet, Rfl: 5  •  fluticasone (FLONASE) 50 MCG/ACT nasal spray, 2 sprays into each nostril Daily., Disp: 1 bottle, Rfl: 1  •  ondansetron (ZOFRAN) 4 MG tablet, Take 4 mg by mouth Every 8 (Eight) Hours As Needed for Nausea or Vomiting., Disp: , Rfl:     No problems with medications.  Refills if needed done    Allergies   Allergen Reactions   • Contrave [Naltrexone-Bupropion Hcl Er]      Nausea         Review of Systems  GENERAL:  Active/slower with  limits, speed, stamina with this. Sleep is ok. No fever now.  SKIN: No  rash/skin lesion of concern:   ENDO:  No syncope, near or diaphoretic sweaty spells.  HEENT: No recent head injury; or headache,  No vision change, No significant hearing loss.  Ears without pain/drainage.  No sore throat.  No significant nasal/sinus congestion/drainage. No epistaxis.  CHEST: No chest wall tenderness or mass. No significant cough, without wheeze.  No SOB; no hemoptysis.  CV: No chest pain, palpitations, ankle edema.  GI: No dysphagia.  RUQ abdominal pain without diarrhea, constipation.  No rectal bleeding, or melena.  Occ heartburn.   Colon-neg/[place]//10.2.17  EGD-itis/[place]//10.2.17    :  Voids without dysuria, or  incontinence to completion.  ORTHO: No painful/swollen joints but various on /off sore.  No sore neck or back.  No acute neck or back pain without recent injury.  NEURO: No dizziness, weakness of extremities.  No numbness/paresthesias.   PSYCH: No memory loss.  Mood good; not that anxious, depressed but/and not suicidal.  Tries to tolerate stress .     Results for orders placed or performed in visit on 11/22/17   Iron Profile   Result Value Ref Range    TIBC 424 (H) 225 - 420 mcg/dL    UIBC 390 mcg/dL    Iron 34 (L) 42 - 180 mcg/dL    Iron Saturation 8 (L) 20 - 45 %   CBC & Differential   Result Value Ref Range    WBC 8.15 4.80 - 10.80 10*3/mm3    RBC 4.81 4.20 - 5.40 10*6/mm3    Hemoglobin 11.9 (L) 12.0 - 16.0 g/dL    Hematocrit 38.2 37.0 - 47.0 %    MCV 79.4 (L) 82.0 - 98.0 fL    MCH 24.7 (L) 28.0 - 32.0 pg    MCHC 31.2 (L) 33.0 - 36.0 g/dL    RDW 15.1 (H) 12.0 - 15.0 %    Platelets 303 130 - 400 10*3/mm3    Neutrophil Rel % 59.8 39.0 - 78.0 %    Lymphocyte Rel % 27.0 15.0 - 45.0 %    Monocyte Rel % 6.7 4.0 - 12.0 %    Eosinophil Rel % 5.6 (H) 0.0 - 4.0 %    Basophil Rel % 0.7 0.0 - 2.0 %    Neutrophils Absolute 4.86 1.87 - 8.40 10*3/mm3    Lymphocytes Absolute 2.20 0.72 - 4.86 10*3/mm3    Monocytes  "Absolute 0.55 0.19 - 1.30 10*3/mm3    Eosinophils Absolute 0.46 0.00 - 0.70 10*3/mm3    Basophils Absolute 0.06 0.00 - 0.20 10*3/mm3    Immature Granulocyte Rel % 0.2 0.0 - 5.0 %    Immature Grans Absolute 0.02 0.00 - 0.03 10*3/mm3       No results found for: PSA     Lab Results:  CBC:  Lab Results - Last 18 Months  Lab Units 11/22/17  0920 09/16/17  2128 10/10/16  1305   WBC 10*3/mm3 8.15 8.87  --    HEMOGLOBIN g/dL 11.9* 12.5  --    HEMATOCRIT % 38.2 38.2  --    PLATELETS 10*3/mm3 303 291  --    IRON mcg/dL 34*  --  37*      BMP/CMP:  Lab Results - Last 18 Months  Lab Units 09/16/17 2128   SODIUM mmol/L 141   POTASSIUM mmol/L 4.3   CHLORIDE mmol/L 108   CO2 mmol/L 22.0*   BUN mg/dL 14   CREATININE mg/dL 0.68   EGFR IF NONAFRICN AM mL/min/1.73 108   CALCIUM mg/dL 9.5     HEPATIC:  Lab Results - Last 18 Months  Lab Units 09/16/17 2128   ALT (SGPT) U/L 50   AST (SGOT) U/L 38   ALK PHOS U/L 79     THYROID:No results for input(s): TSH, T3FREE, FREET4, FTI in the last 75105 hours.    Invalid input(s): T3, T4, TEUP  A1C:No results for input(s): HGBA1C in the last 77794 hours.  PSA:No results for input(s): PSA in the last 07068 hours.    Objective   /80 (BP Location: Left arm, Patient Position: Sitting, Cuff Size: Large Adult)   Pulse 96   Temp 98.5 °F (36.9 °C) (Oral)   Resp 18   Ht 170.2 cm (67\")   Wt 126 kg (277 lb)   LMP 03/21/2018 (Exact Date)   SpO2 99%   BMI 43.38 kg/m²   Body mass index is 43.38 kg/m².    Physical Exam  GENERAL:  Well nourished/developed in no acute distress. Obese  SKIN: Turgor excellent, without wound, rash, lesion   HEENT: Normal cephalic without trauma.  Pupils equal round reactive to light. Extraocular motions full without nystagmus.   External canals nonobstructive nontender without reddness. Tymphatic membranes raman with cheikh structures intact.   Oral cavity without growths, exudates, and moist.  Posterior pharynx without mass, obstruction, redness.  No thyromegaly, mass, " tenderness, lymphadenopathy and supple.  CV: Regular rhythm.  No murmur, gallop,  edema. Posterior pulses intact.  No carotid bruits.  CHEST: No chest wall tenderness or mass.   LUNGS: Symmetric motion with clear to auscultation.  No dullness to percussion  ABD: Soft, RUQ tender without mass.   ORTHO: Symmetric extremities without swelling/point tenderness.  Full gross range of motion.  NEURO: CN 2-12 grossly intact.  Symmetric facies and UE/LE. 3/5 strength throughout  Nonfocal use extremities. Speech clear.    PSYCH: Oriented x 3.  Pleasant calm, well kept.  Purposeful/directed conservation with intact short/long gross memory.     Assessment/Plan     1. Gastroesophageal reflux disease, esophagitis presence not specified    2. Right upper quadrant abdominal pain        Rx: reviewed/changes:  same    LAB/Testing/Referrals: reviewed/orders:   Today: none  Orders Placed This Encounter   Procedures   • US Gallbladder     Usual:   12m CBC, CMP, LIPID, TSH, fT4, iron    Discussions:   Body mass index is 43.38 kg/m².   Discussed the patient's BMI with her. BMI is above normal parameters. Follow-up plan includes:  exercise counseling, nutrition counseling and when able.  Non-smoker      There are no Patient Instructions on file for this visit.    Follow up: No Follow-up on file.  Future Appointments  Date Time Provider Department Center   5/31/2018 1:00 PM Mak Nuñez MD [facility] None

## 2018-04-03 NOTE — TELEPHONE ENCOUNTER
Needed to know who could do faster    Called patient; explained to stay NPO @ midnight and be ready for possible NM scan tomorrow

## 2018-04-04 ENCOUNTER — TELEPHONE (OUTPATIENT)
Dept: GASTROENTEROLOGY | Facility: CLINIC | Age: 23
End: 2018-04-04

## 2018-04-04 DIAGNOSIS — R10.11 RUQ PAIN: ICD-10-CM

## 2018-04-04 NOTE — TELEPHONE ENCOUNTER
Pt completed her colonoscopy on 10/2/17.  Pt did not complete her labs before procedure of Gastrointestinal Panel PCR Stool per Rectum that was ordered 9/8/17 by DEREK James. Cx'd order. Uzma Jensen MA 04/04/18

## 2018-04-06 ENCOUNTER — OFFICE VISIT (OUTPATIENT)
Dept: FAMILY MEDICINE CLINIC | Facility: CLINIC | Age: 23
End: 2018-04-06

## 2018-04-06 VITALS
DIASTOLIC BLOOD PRESSURE: 70 MMHG | OXYGEN SATURATION: 100 % | HEIGHT: 67 IN | WEIGHT: 278 LBS | BODY MASS INDEX: 43.63 KG/M2 | HEART RATE: 110 BPM | TEMPERATURE: 98.4 F | SYSTOLIC BLOOD PRESSURE: 120 MMHG | RESPIRATION RATE: 18 BRPM

## 2018-04-06 DIAGNOSIS — K92.0 HEMATEMESIS WITH NAUSEA: ICD-10-CM

## 2018-04-06 DIAGNOSIS — K21.9 GASTROESOPHAGEAL REFLUX DISEASE, ESOPHAGITIS PRESENCE NOT SPECIFIED: ICD-10-CM

## 2018-04-06 DIAGNOSIS — K62.5 RECTAL BLEEDING: ICD-10-CM

## 2018-04-06 DIAGNOSIS — R10.13 EPIGASTRIC PAIN: Primary | ICD-10-CM

## 2018-04-06 DIAGNOSIS — R10.11 RIGHT UPPER QUADRANT ABDOMINAL PAIN: ICD-10-CM

## 2018-04-06 LAB
ALBUMIN SERPL-MCNC: 4.6 G/DL (ref 3.5–5)
ALBUMIN/GLOB SERPL: 1.7 G/DL (ref 1.1–2.5)
ALP SERPL-CCNC: 73 U/L (ref 24–120)
ALT SERPL-CCNC: 41 U/L (ref 0–54)
AMYLASE SERPL-CCNC: 52 U/L (ref 30–110)
AST SERPL-CCNC: 24 U/L (ref 7–45)
BASOPHILS # BLD AUTO: 0.08 10*3/MM3 (ref 0–0.2)
BASOPHILS NFR BLD AUTO: 1 % (ref 0–2)
BILIRUB SERPL-MCNC: 0.5 MG/DL (ref 0.1–1)
BUN SERPL-MCNC: 10 MG/DL (ref 5–21)
BUN/CREAT SERPL: 15.9 (ref 7–25)
CALCIUM SERPL-MCNC: 9.6 MG/DL (ref 8.4–10.4)
CHLORIDE SERPL-SCNC: 103 MMOL/L (ref 98–110)
CO2 SERPL-SCNC: 26 MMOL/L (ref 24–31)
CREAT SERPL-MCNC: 0.63 MG/DL (ref 0.5–1.4)
EOSINOPHIL # BLD AUTO: 0.39 10*3/MM3 (ref 0–0.7)
EOSINOPHIL NFR BLD AUTO: 5.1 % (ref 0–4)
ERYTHROCYTE [DISTWIDTH] IN BLOOD BY AUTOMATED COUNT: 14.7 % (ref 12–15)
GFR SERPLBLD CREATININE-BSD FMLA CKD-EPI: 118 ML/MIN/1.73
GFR SERPLBLD CREATININE-BSD FMLA CKD-EPI: 143 ML/MIN/1.73
GLOBULIN SER CALC-MCNC: 2.7 GM/DL
GLUCOSE SERPL-MCNC: 90 MG/DL (ref 70–100)
HCT VFR BLD AUTO: 38.8 % (ref 37–47)
HGB BLD-MCNC: 11.9 G/DL (ref 12–16)
IMM GRANULOCYTES # BLD: 0.04 10*3/MM3 (ref 0–0.03)
IMM GRANULOCYTES NFR BLD: 0.5 % (ref 0–5)
LIPASE SERPL-CCNC: 74 U/L (ref 23–203)
LYMPHOCYTES # BLD AUTO: 2.24 10*3/MM3 (ref 0.72–4.86)
LYMPHOCYTES NFR BLD AUTO: 29.1 % (ref 15–45)
MCH RBC QN AUTO: 23.2 PG (ref 28–32)
MCHC RBC AUTO-ENTMCNC: 30.7 G/DL (ref 33–36)
MCV RBC AUTO: 75.8 FL (ref 82–98)
MONOCYTES # BLD AUTO: 0.58 10*3/MM3 (ref 0.19–1.3)
MONOCYTES NFR BLD AUTO: 7.5 % (ref 4–12)
NEUTROPHILS # BLD AUTO: 4.37 10*3/MM3 (ref 1.87–8.4)
NEUTROPHILS NFR BLD AUTO: 56.8 % (ref 39–78)
NRBC BLD AUTO-RTO: 0 /100 WBC (ref 0–0)
PLATELET # BLD AUTO: 302 10*3/MM3 (ref 130–400)
POTASSIUM SERPL-SCNC: 4.4 MMOL/L (ref 3.5–5.3)
PROT SERPL-MCNC: 7.3 G/DL (ref 6.3–8.7)
RBC # BLD AUTO: 5.12 10*6/MM3 (ref 4.2–5.4)
SODIUM SERPL-SCNC: 141 MMOL/L (ref 135–145)
WBC # BLD AUTO: 7.7 10*3/MM3 (ref 4.8–10.8)

## 2018-04-06 PROCEDURE — 99213 OFFICE O/P EST LOW 20 MIN: CPT | Performed by: FAMILY MEDICINE

## 2018-04-06 NOTE — PROGRESS NOTES
Subjective   My Chavez is a 22 y.o. female presenting with chief complaint of:   Chief Complaint   Patient presents with   • Abdominal Pain       History of Present Illness :  Alone.  Here for primarily an acute issue today; abdominal pain.       Colon-neg/Chano//10.2.17  EGD-itis/[place]ano//10.2.17    RUQ pain seen 4.2.18  4.3.18 [facility] mild gb wall thickening without stones/dilation  Normal liver/kidney  4.4.18 [facility] EF 55%    Continues to have pain TRE/RUQ worse with eating and was worse during NM/gb study.     Has few chronic problems to consider that might have a bearing on today's issues; not an interval appointment.       1. Epigastric pain    2. Gastroesophageal reflux disease, esophagitis presence not specified    3. Rectal bleeding-neg colon/egd itis    4. Right upper quadrant abdominal pain    5. Hematemesis with nausea      Has an/another acute issue today: none.    The following portions of the patient's history were reviewed and updated as appropriate: allergies, current medications, past family history, past medical history, past social history, past surgical history and problem list.  Records acquired and reviewed; TCC migrated.      Current Outpatient Prescriptions:   •  ALPRAZolam (XANAX) 0.5 MG tablet, TAKE 1/2 TABLET THREE TIMES DAILY AS NEEDED, Disp: 15 tablet, Rfl: 0  •  calcium carbonate EX (TUMS EX) 750 MG chewable tablet, Chew 1,500 mg 4 (Four) Times a Day As Needed for Indigestion or Heartburn., Disp: , Rfl:   •  citalopram (CeleXA) 20 MG tablet, TAKE ONE TABLET DAILY, Disp: 30 tablet, Rfl: 5  •  ferrous sulfate 325 (65 FE) MG tablet, Take 325 mg by mouth daily with breakfast., Disp: , Rfl:   •  fluticasone (FLONASE) 50 MCG/ACT nasal spray, 2 sprays into each nostril Daily., Disp: 1 bottle, Rfl: 1  •  ondansetron (ZOFRAN) 4 MG tablet, Take 4 mg by mouth Every 8 (Eight) Hours As Needed for Nausea or Vomiting., Disp: , Rfl:   •  pantoprazole (PROTONIX) 40 MG EC tablet, TAKE ONE TABLET DAILY,  Disp: 30 tablet, Rfl: 5    No problems with medications.  Refills if needed done    Allergies   Allergen Reactions   • Contrave [Naltrexone-Bupropion Hcl Er]      Nausea         Review of Systems  GENERAL:  Active work throughout this week xrays, etc. . Sleep is ok. No fever now.  SKIN: No  rash/skin lesion of concern:   ENDO:  No syncope, near or diaphoretic sweaty spells.  HEENT: No recent head injury; or headache,  No vision change, No hearing loss.  Ears without pain/drainage.  No sore throat.  No nasal/sinus congestion/drainage. No epistaxis.  CHEST: No chest wall tenderness or mass. No cough,  without wheeze.  No SOB; no hemoptysis.  CV: No chest pain, palpitations, ankle edema.  GI: No heartburn, dysphagia.  Mod painful/abdominal pain on/off without diarrhea, constipation.  No rectal bleeding, or melena.    :  Voids without dysuria, or  incontinence to completion.  ORTHO: No painful/swollen joints but various on /off sore.  No sore neck or back.  No acute neck or back pain without recent injury.  NEURO: No dizziness, weakness of extremities.  No numbness/paresthesias.   PSYCH: No memory loss.  Mood good; anxious, depressed but/and not suicidal.  Tries to tolerate stress .     Results for orders placed or performed in visit on 11/22/17   Iron Profile   Result Value Ref Range    TIBC 424 (H) 225 - 420 mcg/dL    UIBC 390 mcg/dL    Iron 34 (L) 42 - 180 mcg/dL    Iron Saturation 8 (L) 20 - 45 %   CBC & Differential   Result Value Ref Range    WBC 8.15 4.80 - 10.80 10*3/mm3    RBC 4.81 4.20 - 5.40 10*6/mm3    Hemoglobin 11.9 (L) 12.0 - 16.0 g/dL    Hematocrit 38.2 37.0 - 47.0 %    MCV 79.4 (L) 82.0 - 98.0 fL    MCH 24.7 (L) 28.0 - 32.0 pg    MCHC 31.2 (L) 33.0 - 36.0 g/dL    RDW 15.1 (H) 12.0 - 15.0 %    Platelets 303 130 - 400 10*3/mm3    Neutrophil Rel % 59.8 39.0 - 78.0 %    Lymphocyte Rel % 27.0 15.0 - 45.0 %    Monocyte Rel % 6.7 4.0 - 12.0 %    Eosinophil Rel % 5.6 (H) 0.0 - 4.0 %    Basophil Rel % 0.7 0.0  "- 2.0 %    Neutrophils Absolute 4.86 1.87 - 8.40 10*3/mm3    Lymphocytes Absolute 2.20 0.72 - 4.86 10*3/mm3    Monocytes Absolute 0.55 0.19 - 1.30 10*3/mm3    Eosinophils Absolute 0.46 0.00 - 0.70 10*3/mm3    Basophils Absolute 0.06 0.00 - 0.20 10*3/mm3    Immature Granulocyte Rel % 0.2 0.0 - 5.0 %    Immature Grans Absolute 0.02 0.00 - 0.03 10*3/mm3       No results found for: PSA     Lab Results:  CBC:  Lab Results - Last 18 Months  Lab Units 11/22/17  0920 09/16/17  2128 10/10/16  1305   WBC 10*3/mm3 8.15 8.87  --    HEMOGLOBIN g/dL 11.9* 12.5  --    HEMATOCRIT % 38.2 38.2  --    PLATELETS 10*3/mm3 303 291  --    IRON mcg/dL 34*  --  37*      BMP/CMP:  Lab Results - Last 18 Months  Lab Units 09/16/17 2128   SODIUM mmol/L 141   POTASSIUM mmol/L 4.3   CHLORIDE mmol/L 108   CO2 mmol/L 22.0*   BUN mg/dL 14   CREATININE mg/dL 0.68   EGFR IF NONAFRICN AM mL/min/1.73 108   CALCIUM mg/dL 9.5     HEPATIC:  Lab Results - Last 18 Months  Lab Units 09/16/17 2128   ALT (SGPT) U/L 50   AST (SGOT) U/L 38   ALK PHOS U/L 79     THYROID:No results for input(s): TSH, T3FREE, FREET4, FTI in the last 62339 hours.    Invalid input(s): T3, T4, TEUP  A1C:No results for input(s): HGBA1C in the last 52055 hours.  PSA:No results for input(s): PSA in the last 69320 hours.    Objective   /70   Pulse 110   Temp 98.4 °F (36.9 °C) (Oral)   Resp 18   Ht 170.2 cm (67\")   Wt 126 kg (278 lb)   LMP 03/21/2018 (Exact Date)   SpO2 100%   Breastfeeding? No   BMI 43.54 kg/m²   Body mass index is 43.54 kg/m².    Physical Exam  GENERAL:  Well nourished/developed in no acute distress.   SKIN: Turgor excellent, without wound, rash, lesion  HEENT: Normal cephalic without trauma.  Pupils equal round reactive to light. Extraocular motions full without nystagmus.   External canals nonobstructive nontender without reddness. Tymphatic membranes raman with cheikh structures intact.   Oral cavity without growths, exudates, and moist.  Posterior " pharynx without mass, obstruction, redness.  No thyromegaly, mass, tenderness, lymphadenopathy and supple.  CV: Regular rhythm.  No murmur, gallop,  edema. Posterior pulses intact.  No carotid bruits.  CHEST: No chest wall tenderness or mass.   LUNGS: Symmetric motion with clear to auscultation.  No dullness to percussion  ABD: Soft,  TRE/tender without mass.   ORTHO: Symmetric extremities without swelling/point tenderness.  Full gross range of motion.  NEURO: CN 2-12 grossly intact.  Symmetric facies and UE/LE. 3/5 strength throughout.  Nonfocal use extremities. Speech clear.    PSYCH: Oriented x 3.  Pleasant calm, well kept. Purposeful/directed conservation with intact short/long gross memory.     Assessment/Plan     1. Epigastric pain    2. Gastroesophageal reflux disease, esophagitis presence not specified    3. Rectal bleeding-neg colon/egd itis    4. Right upper quadrant abdominal pain    5. Hematemesis with nausea        Rx: reviewed/changes:  same    LAB/Testing/Referrals: reviewed/orders:   Today:   Orders Placed This Encounter   Procedures   • Lipase   • Amylase   • Comprehensive Metabolic Panel   • Ambulatory Referral to General Surgery   • CBC & Differential     Usual:   Extra 1m CBC, iron around 12.27.17  12m CBC, CMP, LIPID, TSH, fT4, iron    Discussions:   Body mass index is 43.54 kg/m².   Discussed the patient's BMI with her. BMI is above normal parameters. Follow-up plan includes:  exercise counseling and nutrition counseling.  Non-smoker      There are no Patient Instructions on file for this visit.    Follow up: Return for will see how testing/or treatment goes and decide;.  Future Appointments  Date Time Provider Department Center   5/31/2018 1:00 PM Mak Nuñez MD [facility] None

## 2018-04-09 ENCOUNTER — TELEPHONE (OUTPATIENT)
Dept: FAMILY MEDICINE CLINIC | Facility: CLINIC | Age: 23
End: 2018-04-09

## 2018-04-09 DIAGNOSIS — R10.9 ABDOMINAL PAIN, UNSPECIFIED ABDOMINAL LOCATION: Primary | ICD-10-CM

## 2018-04-09 NOTE — TELEPHONE ENCOUNTER
Talked to A [facility]rell surgeon; she agrees to see; wants to talk to her.  She will consider involving GI if she thinks they are needed.

## 2018-04-09 NOTE — TELEPHONE ENCOUNTER
Spoke with pt and she already has apt with Dr Hoang on Thurs Dr Nuñez put in order for surgeon 4/6/18 on apt and was done

## 2018-04-09 NOTE — TELEPHONE ENCOUNTER
Call [facility] office  Tell them mixup on our part-sorry    Dr Nuñez wanted to talk to the surgeon to see My  He talk to ALY Ramírez today;   Ok with him if ok with patient to leave with Maximiliano  If office feels since case was discussed with ALY Ramírez the benefit of that is such that moving visit over to ALY ramírez would be better; maybe we better do that    I am ok with Maximiliano, ALY Ramírez, Zak; whoever; I just discussed the case with ALY Ramírez

## 2018-04-10 NOTE — TELEPHONE ENCOUNTER
FYI: Got this message x 2. Patient has appointment on 4.12.18 at 9:45am with Dr. Viera and is aware.

## 2018-04-10 NOTE — TELEPHONE ENCOUNTER
I wanted you to talk to scheduling at Maximiliano/ALY ramírez office so not to offend either Dr Viera or ALY Ramírez  AND note; I have not talked to Dr Viera as I did ALY Ramírez

## 2018-04-10 NOTE — TELEPHONE ENCOUNTER
Surg office could have gotten her in with Darrell on Wed, but someone else got her appt with Miguel A on Thurs and she wants to stick with that. Called again based on most recent message and everything is fine as patient hasn't see any doctor there and they were aware that Darrell told them we'd be calling and to get patient in. So they said nothing to worry about.

## 2018-04-12 ENCOUNTER — APPOINTMENT (OUTPATIENT)
Dept: PREADMISSION TESTING | Facility: HOSPITAL | Age: 23
End: 2018-04-12

## 2018-04-12 VITALS
HEIGHT: 67 IN | WEIGHT: 276.02 LBS | HEART RATE: 100 BPM | DIASTOLIC BLOOD PRESSURE: 68 MMHG | SYSTOLIC BLOOD PRESSURE: 148 MMHG | BODY MASS INDEX: 43.32 KG/M2 | OXYGEN SATURATION: 98 %

## 2018-04-12 LAB — B-HCG UR QL: NEGATIVE

## 2018-04-12 PROCEDURE — 93010 ELECTROCARDIOGRAM REPORT: CPT | Performed by: INTERNAL MEDICINE

## 2018-04-12 PROCEDURE — 93005 ELECTROCARDIOGRAM TRACING: CPT

## 2018-04-12 PROCEDURE — 81025 URINE PREGNANCY TEST: CPT | Performed by: SPECIALIST

## 2018-04-12 NOTE — DISCHARGE INSTRUCTIONS
DAY OF SURGERY INSTRUCTIONS        Attending:   Martita Gunter MD  Laparoscopic Cholecystectomy With Cholangiogram    DATE OF SURGERY: April 13 2018    ARRIVAL TIME: AS DIRECTED BY OFFICE    DAY OF SURGERY TAKE ONLY THESE MEDICATIONS: NONE        BEFORE YOU COME TO THE HOSPITAL  (Pre-op instructions)  • Do not eat, drink, smoke or chew gum after midnight the night before surgery.  This also includes no mints.  • Morning of surgery take only the medicines you have been instructed with a sip of water unless otherwise instructed  by your physician.  • Do not shave, wear makeup or dark nail polish.  • Remove all jewelry including rings.  • Leave anything you consider valuable at home.  • Leave your suitcase in the car until after your surgery.  • Bring the following with you if applicable:  o Picture ID and insurance, Medicare or Medicaid cards  o Co-pay/deductible required by insurance (cash, check, credit card)  o Copy of advance directive, living will or power-of- documents if not brought to PAT  o CPAP or BIPAP mask and tubing  o Relaxation aids (MP3 player, book, magazine)  • On the day of surgery check in at registration located at the main entrance of the hospital.       Outpatient Surgery Guidelines, Adult  Outpatient procedures are those for which the person having the procedure is allowed to go home the same day as the procedure. Various procedures are done on an outpatient basis. You should follow some general guidelines if you will be having an outpatient procedure.  LET YOUR HEALTH CARE PROVIDER KNOW ABOUT:  · Any allergies you have.  · All medicines you are taking, including vitamins, herbs, eye drops, creams, and over-the-counter medicines.  · Previous problems you or members of your family have had with the use of anesthetics.  · Any blood disorders you have.  · Previous surgeries you have had.  · Medical conditions you have.  RISKS AND COMPLICATIONS  Your health care provider will discuss  possible risks and complications with you before surgery. Common risks and complications include:    · Problems due to the use of anesthetics.  · Blood loss and replacement (does not apply to minor surgical procedures).  · Temporary increase in pain due to surgery.  · Uncorrected pain or problems that the surgery was meant to correct.  · Infection.  · New damage.  BEFORE THE PROCEDURE  · Ask your health care provider about changing or stopping your regular medicines. You may need to stop taking certain medicines in the days or weeks before the procedure.  · Stop smoking at least 2 weeks before surgery. This lowers your risk for complications during and after surgery. Ask your health care provider for help with this if needed.  · Eat your usual meals and a light supper the day before surgery. Continue fluid intake. Do not drink alcohol.  · Do not eat or drink after midnight the night before your surgery.   · Arrange for someone to take you home and to stay with you for 24 hours after the procedure. Medicine given for your procedure may affect your ability to drive or to care for yourself.  · Call your health care provider's office if you develop an illness or problem that may prevent you from safely having your procedure.  AFTER THE PROCEDURE  After surgery, you will be taken to a recovery area, where your progress will be monitored. If there are no complications, you will be allowed to go home when you are awake, stable, and taking fluids well. You may have numbness around the surgical site. Healing will take some time. You will have tenderness at the surgical site and may have some swelling and bruising. You may also have some nausea.  HOME CARE INSTRUCTIONS  · Do not drive for 24 hours, or as directed by your health care provider. Do not drive while taking prescription pain medicines.  · Do not drink alcohol for 24 hours.  · Do not make important decisions or sign legal documents for 24 hours.  · You may resume a  normal diet and activities as directed.  · Do not lift anything heavier than 10 pounds (4.5 kg) or play contact sports until your health care provider says it is okay.  · Change your bandages (dressings) as directed.  · Only take over-the-counter or prescription medicines as directed by your health care provider.  · Follow up with your health care provider as directed.  SEEK MEDICAL CARE IF:  · You have increased bleeding (more than a small spot) from the surgical site.  · You have redness, swelling, or increasing pain in the wound.  · You see pus coming from the wound.  · You have a fever.  · You notice a bad smell coming from the wound or dressing.  · You feel lightheaded or faint.  · You develop a rash.  · You have trouble breathing.  · You develop allergies.  MAKE SURE YOU:  · Understand these instructions.  · Will watch your condition.  · Will get help right away if you are not doing well or get worse.     This information is not intended to replace advice given to you by your health care provider. Make sure you discuss any questions you have with your health care provider.     Document Released: 09/12/2002 Document Revised: 05/03/2016 Document Reviewed: 05/22/2014  [employer] Interactive Patient Education ©2016 [employer] Inc.       Fall Prevention in Hospitals, Adult  As a hospital patient, your condition and the treatments you receive can increase your risk for falls. Some additional risk factors for falls in a hospital include:  · Being in an unfamiliar environment.  · Being on bed rest.  · Your surgery.  · Taking certain medicines.  · Your tubing requirements, such as intravenous (IV) therapy or catheters.  It is important that you learn how to decrease fall risks while at the hospital. Below are important tips that can help prevent falls.  SAFETY TIPS FOR PREVENTING FALLS  Talk about your risk of falling.  · Ask your health care provider why you are at risk for falling. Is it your medicine, illness, tubing  placement, or something else?  · Make a plan with your health care provider to keep you safe from falls.  · Ask your health care provider or pharmacist about side effects of your medicines. Some medicines can make you dizzy or affect your coordination.  Ask for help.  · Ask for help before getting out of bed. You may need to press your call button.  · Ask for assistance in getting safely to the toilet.  · Ask for a walker or cane to be put at your bedside. Ask that most of the side rails on your bed be placed up before your health care provider leaves the room.  · Ask family or friends to sit with you.  · Ask for things that are out of your reach, such as your glasses, hearing aids, telephone, bedside table, or call button.  Follow these tips to avoid falling:  · Stay lying or seated, rather than standing, while waiting for help.  · Wear rubber-soled slippers or shoes whenever you walk in the hospital.  · Avoid quick, sudden movements.  ¨ Change positions slowly.  ¨ Sit on the side of your bed before standing.  ¨ Stand up slowly and wait before you start to walk.  · Let your health care provider know if there is a spill on the floor.  · Pay careful attention to the medical equipment, electrical cords, and tubes around you.  · When you need help, use your call button by your bed or in the bathroom. Wait for one of your health care providers to help you.  · If you feel dizzy or unsure of your footing, return to bed and wait for assistance.  · Avoid being distracted by the TV, telephone, or another person in your room.  · Do not lean or support yourself on rolling objects, such as IV poles or bedside tables.     This information is not intended to replace advice given to you by your health care provider. Make sure you discuss any questions you have with your health care provider.     Document Released: 12/15/2001 Document Revised: 01/08/2016 Document Reviewed: 08/25/2013  [employer] Interactive Patient Education ©2016  [employer]       Surgical Site Infections FAQs  What is a Surgical Site Infection (SSI)?  A surgical site infection is an infection that occurs after surgery in the part of the body where the surgery took place. Most patients who have surgery do not develop an infection. However, infections develop in about 1 to 3 out of every 100 patients who have surgery.  Some of the common symptoms of a surgical site infection are:  · Redness and pain around the area where you had surgery  · Drainage of cloudy fluid from your surgical wound  · Fever  Can SSIs be treated?  Yes. Most surgical site infections can be treated with antibiotics. The antibiotic given to you depends on the bacteria (germs) causing the infection. Sometimes patients with SSIs also need another surgery to treat the infection.  What are some of the things that hospitals are doing to prevent SSIs?  To prevent SSIs, doctors, nurses, and other healthcare providers:  · Clean their hands and arms up to their elbows with an antiseptic agent just before the surgery.  · Clean their hands with soap and water or an alcohol-based hand rub before and after caring for each patient.  · May remove some of your hair immediately before your surgery using electric clippers if the hair is in the same area where the procedure will occur. They should not shave you with a razor.  · Wear special hair covers, masks, gowns, and gloves during surgery to keep the surgery area clean.  · Give you antibiotics before your surgery starts. In most cases, you should get antibiotics within 60 minutes before the surgery starts and the antibiotics should be stopped within 24 hours after surgery.  · Clean the skin at the site of your surgery with a special soap that kills germs.  What can I do to help prevent SSIs?  Before your surgery:  · Tell your doctor about other medical problems you may have. Health problems such as allergies, diabetes, and obesity could affect your surgery and your  treatment.  · Quit smoking. Patients who smoke get more infections. Talk to your doctor about how you can quit before your surgery.  · Do not shave near where you will have surgery. Shaving with a razor can irritate your skin and make it easier to develop an infection.  At the time of your surgery:  · Speak up if someone tries to shave you with a razor before surgery. Ask why you need to be shaved and talk with your surgeon if you have any concerns.  · Ask if you will get antibiotics before surgery.  After your surgery:  · Make sure that your healthcare providers clean their hands before examining you, either with soap and water or an alcohol-based hand rub.  · If you do not see your providers clean their hands, please ask them to do so.  · Family and friends who visit you should not touch the surgical wound or dressings.  · Family and friends should clean their hands with soap and water or an alcohol-based hand rub before and after visiting you. If you do not see them clean their hands, ask them to clean their hands.  What do I need to do when I go home from the hospital?  · Before you go home, your doctor or nurse should explain everything you need to know about taking care of your wound. Make sure you understand how to care for your wound before you leave the hospital.  · Always clean your hands before and after caring for your wound.  · Before you go home, make sure you know who to contact if you have questions or problems after you get home.  · If you have any symptoms of an infection, such as redness and pain at the surgery site, drainage, or fever, call your doctor immediately.  If you have additional questions, please ask your doctor or nurse.  Developed and co-sponsored by The Society for Healthcare Epidemiology of [place] (SHEA); Infectious Diseases Society of [place] (IDSA); American Hospital Association; Association for Professionals in Infection Control and Epidemiology (APIC); Centers for Disease  Control and Prevention (CDC); and The Joint Commission.     This information is not intended to replace advice given to you by your health care provider. Make sure you discuss any questions you have with your health care provider.     Document Released: 12/23/2014 Document Revised: 01/08/2016 Document Reviewed: 03/02/2016  [employer] Interactive Patient Education ©2016 [employer] Inc.       [facility]  CHG 4% Patient Instruction Sheet    Preparing the Skin Before Surgery  Preparing or “prepping” skin before surgery can reduce the risk of infection at the surgical site. To make the process easier,[facility] has chosen 4% Chlorhexidine Gluconate (CHG) antiseptic solution.   The steps below outline the prepping process and should be carefully followed.                                                                                                                                                      Prep the skin at the following time(s):                                                      We recommend you shower the night before surgery, and again the morning of surgery with the 4% CHG antiseptic solution using half of the bottle and a cloth each time.  Dress in clean clothes/sleepwear after showering.  See instructions below for application.          Do not apply any lotions or moisturizers.       Do not shave the area to be prepped for at least 2 days prior to surgery.    Clipping the hair may be done immediately prior to your surgery at the hospital    if needed.    Directions:  Thoroughly rinse your body with water.  Apply 4% CHG to a cloth and wash skin gently, paying special attention to the operative site.  Rinse again thoroughly.  Once you have begun using this product do not apply anything else to your skin. If itching or redness persists, rinse affected areas and discontinue use.    When using this product:  • Keep out of eyes, ears, and mouth.  • If solution should contact these areas, rinse out promptly  and thoroughly with water.  • For external use only.  • Do not use in genital area, on your face or head.      PATIENT/FAMILY/RESPONSIBLE PARTY VERBALIZES UNDERSTANDING OF ABOVE EDUCATION.  COPY OF PAIN SCALE GIVEN AND REVIEWED WITH VERBALIZED UNDERSTANDING.

## 2018-04-13 ENCOUNTER — HOSPITAL ENCOUNTER (OUTPATIENT)
Facility: HOSPITAL | Age: 23
Setting detail: HOSPITAL OUTPATIENT SURGERY
Discharge: HOME | End: 2018-04-13
Attending: SPECIALIST | Admitting: SPECIALIST

## 2018-04-13 ENCOUNTER — ANESTHESIA EVENT (OUTPATIENT)
Dept: PERIOP | Facility: HOSPITAL | Age: 23
End: 2018-04-13

## 2018-04-13 ENCOUNTER — ANESTHESIA (OUTPATIENT)
Dept: PERIOP | Facility: HOSPITAL | Age: 23
End: 2018-04-13

## 2018-04-13 VITALS
DIASTOLIC BLOOD PRESSURE: 77 MMHG | SYSTOLIC BLOOD PRESSURE: 113 MMHG | OXYGEN SATURATION: 98 % | HEART RATE: 77 BPM | RESPIRATION RATE: 18 BRPM | TEMPERATURE: 97.2 F

## 2018-04-13 DIAGNOSIS — R52 PAIN: ICD-10-CM

## 2018-04-13 PROCEDURE — 25010000002 ONDANSETRON PER 1 MG: Performed by: ANESTHESIOLOGY

## 2018-04-13 PROCEDURE — 25010000002 MIDAZOLAM PER 1 MG: Performed by: ANESTHESIOLOGY

## 2018-04-13 PROCEDURE — 25010000002 DEXAMETHASONE PER 1 MG: Performed by: ANESTHESIOLOGY

## 2018-04-13 PROCEDURE — 25010000002 PROPOFOL 10 MG/ML EMULSION: Performed by: NURSE ANESTHETIST, CERTIFIED REGISTERED

## 2018-04-13 PROCEDURE — 88304 TISSUE EXAM BY PATHOLOGIST: CPT | Performed by: SPECIALIST

## 2018-04-13 PROCEDURE — 25010000002 NEOSTIGMINE PER 0.5 MG: Performed by: NURSE ANESTHETIST, CERTIFIED REGISTERED

## 2018-04-13 PROCEDURE — 25010000003 CEFAZOLIN PER 500 MG: Performed by: NURSE ANESTHETIST, CERTIFIED REGISTERED

## 2018-04-13 PROCEDURE — 25010000002 DEXAMETHASONE PER 1 MG: Performed by: NURSE ANESTHETIST, CERTIFIED REGISTERED

## 2018-04-13 PROCEDURE — 25010000002 ONDANSETRON PER 1 MG: Performed by: NURSE ANESTHETIST, CERTIFIED REGISTERED

## 2018-04-13 PROCEDURE — 25010000002 FENTANYL CITRATE (PF) 100 MCG/2ML SOLUTION: Performed by: ANESTHESIOLOGY

## 2018-04-13 RX ORDER — METOPROLOL TARTRATE 5 MG/5ML
2.5 INJECTION INTRAVENOUS
Status: DISCONTINUED | OUTPATIENT
Start: 2018-04-13 | End: 2018-04-13 | Stop reason: HOSPADM

## 2018-04-13 RX ORDER — SODIUM CHLORIDE 0.9 % (FLUSH) 0.9 %
1-10 SYRINGE (ML) INJECTION AS NEEDED
Status: DISCONTINUED | OUTPATIENT
Start: 2018-04-13 | End: 2018-04-13 | Stop reason: HOSPADM

## 2018-04-13 RX ORDER — DEXTROSE MONOHYDRATE 25 G/50ML
12.5 INJECTION, SOLUTION INTRAVENOUS AS NEEDED
Status: DISCONTINUED | OUTPATIENT
Start: 2018-04-13 | End: 2018-04-13 | Stop reason: HOSPADM

## 2018-04-13 RX ORDER — HYDROCODONE BITARTRATE AND ACETAMINOPHEN 7.5; 325 MG/1; MG/1
1 TABLET ORAL EVERY 4 HOURS PRN
Qty: 30 TABLET | Refills: 0 | Status: SHIPPED | OUTPATIENT
Start: 2018-04-13 | End: 2018-04-20

## 2018-04-13 RX ORDER — DEXAMETHASONE SODIUM PHOSPHATE 4 MG/ML
4 INJECTION, SOLUTION INTRA-ARTICULAR; INTRALESIONAL; INTRAMUSCULAR; INTRAVENOUS; SOFT TISSUE ONCE AS NEEDED
Status: COMPLETED | OUTPATIENT
Start: 2018-04-13 | End: 2018-04-13

## 2018-04-13 RX ORDER — MEPERIDINE HYDROCHLORIDE 25 MG/ML
12.5 INJECTION INTRAMUSCULAR; INTRAVENOUS; SUBCUTANEOUS
Status: DISCONTINUED | OUTPATIENT
Start: 2018-04-13 | End: 2018-04-13 | Stop reason: HOSPADM

## 2018-04-13 RX ORDER — LIDOCAINE HYDROCHLORIDE 20 MG/ML
INJECTION, SOLUTION INFILTRATION; PERINEURAL AS NEEDED
Status: DISCONTINUED | OUTPATIENT
Start: 2018-04-13 | End: 2018-04-13 | Stop reason: SURG

## 2018-04-13 RX ORDER — CEFAZOLIN SODIUM 1 G/3ML
INJECTION, POWDER, FOR SOLUTION INTRAMUSCULAR; INTRAVENOUS AS NEEDED
Status: DISCONTINUED | OUTPATIENT
Start: 2018-04-13 | End: 2018-04-13 | Stop reason: SURG

## 2018-04-13 RX ORDER — FENTANYL CITRATE 50 UG/ML
25 INJECTION, SOLUTION INTRAMUSCULAR; INTRAVENOUS
Status: DISCONTINUED | OUTPATIENT
Start: 2018-04-13 | End: 2018-04-13 | Stop reason: HOSPADM

## 2018-04-13 RX ORDER — SODIUM CHLORIDE 0.9 % (FLUSH) 0.9 %
3 SYRINGE (ML) INJECTION AS NEEDED
Status: DISCONTINUED | OUTPATIENT
Start: 2018-04-13 | End: 2018-04-13 | Stop reason: HOSPADM

## 2018-04-13 RX ORDER — ONDANSETRON 2 MG/ML
4 INJECTION INTRAMUSCULAR; INTRAVENOUS ONCE AS NEEDED
Status: COMPLETED | OUTPATIENT
Start: 2018-04-13 | End: 2018-04-13

## 2018-04-13 RX ORDER — PROPOFOL 10 MG/ML
VIAL (ML) INTRAVENOUS AS NEEDED
Status: DISCONTINUED | OUTPATIENT
Start: 2018-04-13 | End: 2018-04-13 | Stop reason: SURG

## 2018-04-13 RX ORDER — NALOXONE HCL 0.4 MG/ML
0.4 VIAL (ML) INJECTION AS NEEDED
Status: DISCONTINUED | OUTPATIENT
Start: 2018-04-13 | End: 2018-04-13 | Stop reason: HOSPADM

## 2018-04-13 RX ORDER — HYDROCODONE BITARTRATE AND ACETAMINOPHEN 5; 325 MG/1; MG/1
1 TABLET ORAL ONCE AS NEEDED
Status: DISCONTINUED | OUTPATIENT
Start: 2018-04-13 | End: 2018-04-13 | Stop reason: HOSPADM

## 2018-04-13 RX ORDER — DEXAMETHASONE SODIUM PHOSPHATE 4 MG/ML
INJECTION, SOLUTION INTRA-ARTICULAR; INTRALESIONAL; INTRAMUSCULAR; INTRAVENOUS; SOFT TISSUE AS NEEDED
Status: DISCONTINUED | OUTPATIENT
Start: 2018-04-13 | End: 2018-04-13 | Stop reason: SURG

## 2018-04-13 RX ORDER — SCOLOPAMINE TRANSDERMAL SYSTEM 1 MG/1
1 PATCH, EXTENDED RELEASE TRANSDERMAL ONCE
Status: DISCONTINUED | OUTPATIENT
Start: 2018-04-13 | End: 2018-04-13 | Stop reason: HOSPADM

## 2018-04-13 RX ORDER — FAMOTIDINE 10 MG/ML
20 INJECTION, SOLUTION INTRAVENOUS
Status: DISCONTINUED | OUTPATIENT
Start: 2018-04-13 | End: 2018-04-13 | Stop reason: HOSPADM

## 2018-04-13 RX ORDER — HYDRALAZINE HYDROCHLORIDE 20 MG/ML
5 INJECTION INTRAMUSCULAR; INTRAVENOUS
Status: DISCONTINUED | OUTPATIENT
Start: 2018-04-13 | End: 2018-04-13 | Stop reason: HOSPADM

## 2018-04-13 RX ORDER — ROCURONIUM BROMIDE 10 MG/ML
INJECTION, SOLUTION INTRAVENOUS AS NEEDED
Status: DISCONTINUED | OUTPATIENT
Start: 2018-04-13 | End: 2018-04-13 | Stop reason: SURG

## 2018-04-13 RX ORDER — LIDOCAINE HYDROCHLORIDE 40 MG/ML
SOLUTION TOPICAL AS NEEDED
Status: DISCONTINUED | OUTPATIENT
Start: 2018-04-13 | End: 2018-04-13 | Stop reason: SURG

## 2018-04-13 RX ORDER — BUPIVACAINE HYDROCHLORIDE AND EPINEPHRINE 5; 5 MG/ML; UG/ML
INJECTION, SOLUTION PERINEURAL AS NEEDED
Status: DISCONTINUED | OUTPATIENT
Start: 2018-04-13 | End: 2018-04-13 | Stop reason: HOSPADM

## 2018-04-13 RX ORDER — ONDANSETRON 2 MG/ML
INJECTION INTRAMUSCULAR; INTRAVENOUS AS NEEDED
Status: DISCONTINUED | OUTPATIENT
Start: 2018-04-13 | End: 2018-04-13 | Stop reason: SURG

## 2018-04-13 RX ORDER — LABETALOL HYDROCHLORIDE 5 MG/ML
5 INJECTION, SOLUTION INTRAVENOUS
Status: DISCONTINUED | OUTPATIENT
Start: 2018-04-13 | End: 2018-04-13 | Stop reason: HOSPADM

## 2018-04-13 RX ORDER — SODIUM CHLORIDE 9 MG/ML
INJECTION, SOLUTION INTRAVENOUS AS NEEDED
Status: DISCONTINUED | OUTPATIENT
Start: 2018-04-13 | End: 2018-04-13 | Stop reason: HOSPADM

## 2018-04-13 RX ORDER — SODIUM CHLORIDE, SODIUM LACTATE, POTASSIUM CHLORIDE, CALCIUM CHLORIDE 600; 310; 30; 20 MG/100ML; MG/100ML; MG/100ML; MG/100ML
9 INJECTION, SOLUTION INTRAVENOUS CONTINUOUS
Status: DISCONTINUED | OUTPATIENT
Start: 2018-04-13 | End: 2018-04-13 | Stop reason: HOSPADM

## 2018-04-13 RX ORDER — IPRATROPIUM BROMIDE AND ALBUTEROL SULFATE 2.5; .5 MG/3ML; MG/3ML
3 SOLUTION RESPIRATORY (INHALATION) ONCE AS NEEDED
Status: DISCONTINUED | OUTPATIENT
Start: 2018-04-13 | End: 2018-04-13 | Stop reason: HOSPADM

## 2018-04-13 RX ORDER — MIDAZOLAM HYDROCHLORIDE 1 MG/ML
1 INJECTION INTRAMUSCULAR; INTRAVENOUS
Status: DISCONTINUED | OUTPATIENT
Start: 2018-04-13 | End: 2018-04-13 | Stop reason: HOSPADM

## 2018-04-13 RX ORDER — SODIUM CHLORIDE, SODIUM LACTATE, POTASSIUM CHLORIDE, CALCIUM CHLORIDE 600; 310; 30; 20 MG/100ML; MG/100ML; MG/100ML; MG/100ML
1000 INJECTION, SOLUTION INTRAVENOUS CONTINUOUS
Status: DISCONTINUED | OUTPATIENT
Start: 2018-04-13 | End: 2018-04-13 | Stop reason: HOSPADM

## 2018-04-13 RX ORDER — OXYCODONE AND ACETAMINOPHEN 10; 325 MG/1; MG/1
1 TABLET ORAL ONCE AS NEEDED
Status: COMPLETED | OUTPATIENT
Start: 2018-04-13 | End: 2018-04-13

## 2018-04-13 RX ORDER — DIPHENHYDRAMINE HYDROCHLORIDE 50 MG/ML
12.5 INJECTION INTRAMUSCULAR; INTRAVENOUS
Status: DISCONTINUED | OUTPATIENT
Start: 2018-04-13 | End: 2018-04-13 | Stop reason: HOSPADM

## 2018-04-13 RX ORDER — MAGNESIUM HYDROXIDE 1200 MG/15ML
LIQUID ORAL AS NEEDED
Status: DISCONTINUED | OUTPATIENT
Start: 2018-04-13 | End: 2018-04-13 | Stop reason: HOSPADM

## 2018-04-13 RX ORDER — GLYCOPYRROLATE 0.2 MG/ML
INJECTION INTRAMUSCULAR; INTRAVENOUS AS NEEDED
Status: DISCONTINUED | OUTPATIENT
Start: 2018-04-13 | End: 2018-04-13 | Stop reason: SURG

## 2018-04-13 RX ORDER — SUFENTANIL CITRATE 50 UG/ML
INJECTION EPIDURAL; INTRAVENOUS AS NEEDED
Status: DISCONTINUED | OUTPATIENT
Start: 2018-04-13 | End: 2018-04-13 | Stop reason: SURG

## 2018-04-13 RX ORDER — MIDAZOLAM HYDROCHLORIDE 1 MG/ML
2 INJECTION INTRAMUSCULAR; INTRAVENOUS
Status: DISCONTINUED | OUTPATIENT
Start: 2018-04-13 | End: 2018-04-13 | Stop reason: HOSPADM

## 2018-04-13 RX ADMIN — MIDAZOLAM HYDROCHLORIDE 2 MG: 1 INJECTION, SOLUTION INTRAMUSCULAR; INTRAVENOUS at 14:38

## 2018-04-13 RX ADMIN — ONDANSETRON HYDROCHLORIDE 4 MG: 2 SOLUTION INTRAMUSCULAR; INTRAVENOUS at 15:21

## 2018-04-13 RX ADMIN — SUFENTANIL CITRATE 40 MCG: 50 INJECTION, SOLUTION EPIDURAL; INTRAVENOUS at 15:11

## 2018-04-13 RX ADMIN — OXYCODONE HYDROCHLORIDE AND ACETAMINOPHEN 1 TABLET: 10; 325 TABLET ORAL at 16:28

## 2018-04-13 RX ADMIN — ROCURONIUM BROMIDE 30 MG: 10 INJECTION INTRAVENOUS at 15:13

## 2018-04-13 RX ADMIN — HYDROCODONE BITARTRATE AND ACETAMINOPHEN 1 TABLET: 5; 325 TABLET ORAL at 18:40

## 2018-04-13 RX ADMIN — ONDANSETRON 4 MG: 2 INJECTION INTRAMUSCULAR; INTRAVENOUS at 16:24

## 2018-04-13 RX ADMIN — SUFENTANIL CITRATE 10 MCG: 50 INJECTION, SOLUTION EPIDURAL; INTRAVENOUS at 15:33

## 2018-04-13 RX ADMIN — SODIUM CHLORIDE, POTASSIUM CHLORIDE, SODIUM LACTATE AND CALCIUM CHLORIDE: 600; 310; 30; 20 INJECTION, SOLUTION INTRAVENOUS at 15:40

## 2018-04-13 RX ADMIN — CEFAZOLIN 2 G: 1 INJECTION, POWDER, FOR SOLUTION INTRAVENOUS at 15:11

## 2018-04-13 RX ADMIN — LIDOCAINE HYDROCHLORIDE 0.5 ML: 10 INJECTION, SOLUTION EPIDURAL; INFILTRATION; INTRACAUDAL; PERINEURAL at 10:25

## 2018-04-13 RX ADMIN — PROPOFOL 120 MG: 10 INJECTION, EMULSION INTRAVENOUS at 15:13

## 2018-04-13 RX ADMIN — DEXAMETHASONE SODIUM PHOSPHATE 8 MG: 4 INJECTION, SOLUTION INTRAMUSCULAR; INTRAVENOUS at 15:21

## 2018-04-13 RX ADMIN — SODIUM CHLORIDE, POTASSIUM CHLORIDE, SODIUM LACTATE AND CALCIUM CHLORIDE 1000 ML: 600; 310; 30; 20 INJECTION, SOLUTION INTRAVENOUS at 10:29

## 2018-04-13 RX ADMIN — SCOPALAMINE 1 PATCH: 1 PATCH, EXTENDED RELEASE TRANSDERMAL at 14:38

## 2018-04-13 RX ADMIN — Medication 3 MG: at 15:45

## 2018-04-13 RX ADMIN — GLYCOPYRROLATE 0.4 MG: 0.2 INJECTION, SOLUTION INTRAMUSCULAR; INTRAVENOUS at 15:45

## 2018-04-13 RX ADMIN — LIDOCAINE HYDROCHLORIDE 60 MG: 20 INJECTION, SOLUTION INFILTRATION; PERINEURAL at 15:13

## 2018-04-13 RX ADMIN — FENTANYL CITRATE 50 MCG: 50 INJECTION, SOLUTION INTRAMUSCULAR; INTRAVENOUS at 16:15

## 2018-04-13 RX ADMIN — LIDOCAINE HYDROCHLORIDE 1 EACH: 40 SOLUTION TOPICAL at 15:13

## 2018-04-13 RX ADMIN — DEXAMETHASONE SODIUM PHOSPHATE 4 MG: 4 INJECTION, SOLUTION INTRAMUSCULAR; INTRAVENOUS at 14:38

## 2018-04-13 RX ADMIN — FAMOTIDINE 20 MG: 10 INJECTION INTRAVENOUS at 14:38

## 2018-04-13 NOTE — ANESTHESIA PREPROCEDURE EVALUATION
Anesthesia Evaluation     Patient summary reviewed   no history of anesthetic complications:  NPO Solid Status: > 8 hours             Airway   Mallampati: II  TM distance: >3 FB  Neck ROM: full  Dental      Pulmonary    (-) asthma, sleep apnea, not a smoker  Cardiovascular   Exercise tolerance: excellent (>7 METS)    ECG reviewed    (-) pacemaker, past MI, angina, cardiac stents      Neuro/Psych  (-) seizures, TIA, CVA  GI/Hepatic/Renal/Endo    (+) morbid obesity, GERD,    (-) liver disease, diabetes    Musculoskeletal     Abdominal    Substance History      OB/GYN          Other                        Anesthesia Plan    ASA 3     general     intravenous induction   Anesthetic plan and risks discussed with patient.

## 2018-04-13 NOTE — ANESTHESIA POSTPROCEDURE EVALUATION
Patient: My Chavez    Procedure Summary     Date:  04/13/18 Room / Location:   [facility] OR 06 /  [facility]    Anesthesia Start:  1508 Anesthesia Stop:  1557    Procedure:  LAPAROSCOPIC CHOLECYSTECTOMY  WITH CHOLANGIOGRAM (N/A Abdomen) Diagnosis:  (BILIARY DYSKENSIA)    Surgeon:  Martita Gunter MD Provider:  Pramod Alexander CRNA    Anesthesia Type:  general ASA Status:  3          Anesthesia Type: general  Last vitals  BP   93/54 (04/13/18 1805)   Temp   97.2 °F (36.2 °C) (04/13/18 1555)   Pulse   72 (04/13/18 1805)   Resp   16 (04/13/18 1805)     SpO2   95 % (04/13/18 1805)     Post Anesthesia Care and Evaluation    Patient location during evaluation: PACU  Level of consciousness: awake  Pain management: adequate  Airway patency: patent  Anesthetic complications: No anesthetic complications  PONV Status: none  Cardiovascular status: acceptable  Respiratory status: acceptable  Hydration status: acceptable

## 2018-04-13 NOTE — ANESTHESIA PROCEDURE NOTES
Airway  Urgency: elective    Airway not difficult    General Information and Staff    Patient location during procedure: OR  CRNA: KIM GODFREY    Indications and Patient Condition  Indications for airway management: airway protection    Preoxygenated: yes  Mask difficulty assessment: 1 - vent by mask    Final Airway Details  Final airway type: endotracheal airway      Successful airway: ETT  Cuffed: yes   Successful intubation technique: direct laryngoscopy  Endotracheal tube insertion site: oral  Blade: Christopher  Blade size: #2  ETT size: 7.0 mm  Cormack-Lehane Classification: grade I - full view of glottis  Placement verified by: capnometry   Measured from: lips  Number of attempts at approach: 1

## 2018-04-13 NOTE — OP NOTE
CHOLECYSTECTOMY LAPAROSCOPIC      Procedure Note    My Chavez  4/13/2018    Pre-op Diagnosis:   BILIARY DYSKENSIA    Post-op Diagnosis:     same    Procedure/CPT® Codes:  Laparoscopic cholecystectomy    Procedure(s):  LAPAROSCOPIC CHOLECYSTECTOMY  WITH CHOLANGIOGRAM    Surgeon(s):  Martita Gunter MD    Anesthesia: General    Staff:   Circulator: Damian Kaur RN  Scrub Person: Gail Campbell  Assistant: Tex Hall    Estimated Blood Loss:minimal    Specimens:                ID Type Source Tests Collected by Time   A :  Tissue Gallbladder TISSUE PATHOLOGY EXAM Martita Gunter MD 4/13/2018 1534         Drains:      Findings: some adhesions to infundibu;lum    Complications: none          Date: 4/13/2018  Time: 4:25 PM        The patient was brought to the operating room and placed in supine position. After induction of anesthesia and infusion of antibiotics, the patient was prepped and draped in the usual sterile fashion. Versed needle technique was used. Standard 4 ports were placed. The gallbladder was grasped and retracted superiorly. The infundibulum was grasped and retracted laterally.  adhesions dissected with cautery. The cystic duct and cystic artery were identified, isolated, divided between clips. The gallbladder was removed from its bed using electrocautery, placed in Endo bag and removed through the epigastric port. Irrigation was done until all clear. All ports were removed. Fascia at the 10 mm port site was closed with Vicryl. The skin was reapproximated with #4-0 subcuticular. Then 0.5% Marcaine injected for local anesthesia. Dressing was placed. The patient was awakened and transferred to the recovery room in stable condition. He tolerated the procedure well. At the end of the procedure, all counts were correct.    Martita Gunter MD

## 2018-04-13 NOTE — DISCHARGE INSTRUCTIONS

## 2018-04-17 LAB
CYTO UR: NORMAL
LAB AP CASE REPORT: NORMAL
Lab: NORMAL
PATH REPORT.FINAL DX SPEC: NORMAL
PATH REPORT.GROSS SPEC: NORMAL

## 2018-04-20 ENCOUNTER — TELEPHONE (OUTPATIENT)
Dept: OBSTETRICS AND GYNECOLOGY | Facility: CLINIC | Age: 23
End: 2018-04-20

## 2018-04-20 ENCOUNTER — OFFICE VISIT (OUTPATIENT)
Dept: RETAIL CLINIC | Facility: CLINIC | Age: 23
End: 2018-04-20

## 2018-04-20 VITALS
SYSTOLIC BLOOD PRESSURE: 118 MMHG | HEART RATE: 90 BPM | TEMPERATURE: 98.4 F | RESPIRATION RATE: 18 BRPM | DIASTOLIC BLOOD PRESSURE: 76 MMHG | OXYGEN SATURATION: 98 %

## 2018-04-20 DIAGNOSIS — N30.01 ACUTE CYSTITIS WITH HEMATURIA: Primary | ICD-10-CM

## 2018-04-20 LAB
BILIRUB BLD-MCNC: ABNORMAL MG/DL
CLARITY, POC: ABNORMAL
COLOR UR: ABNORMAL
GLUCOSE UR STRIP-MCNC: ABNORMAL MG/DL
KETONES UR QL: ABNORMAL
LEUKOCYTE EST, POC: ABNORMAL
NITRITE UR-MCNC: POSITIVE MG/ML
PH UR: 6.5 [PH] (ref 5–8)
PROT UR STRIP-MCNC: NEGATIVE MG/DL
RBC # UR STRIP: ABNORMAL /UL
SP GR UR: 1.03 (ref 1–1.03)
UROBILINOGEN UR QL: ABNORMAL

## 2018-04-20 PROCEDURE — 81003 URINALYSIS AUTO W/O SCOPE: CPT | Performed by: NURSE PRACTITIONER

## 2018-04-20 PROCEDURE — 99213 OFFICE O/P EST LOW 20 MIN: CPT | Performed by: NURSE PRACTITIONER

## 2018-04-20 RX ORDER — SULFAMETHOXAZOLE AND TRIMETHOPRIM 800; 160 MG/1; MG/1
1 TABLET ORAL 2 TIMES DAILY
Qty: 14 TABLET | Refills: 0 | Status: SHIPPED | OUTPATIENT
Start: 2018-04-20 | End: 2018-04-27

## 2018-04-20 RX ORDER — PHENAZOPYRIDINE HYDROCHLORIDE 200 MG/1
200 TABLET, FILM COATED ORAL 3 TIMES DAILY PRN
Qty: 6 TABLET | Refills: 0 | Status: SHIPPED | OUTPATIENT
Start: 2018-04-20 | End: 2018-04-22

## 2018-04-20 NOTE — TELEPHONE ENCOUNTER
Pt called asking to be seen for UTI notified pt we have not seen her on over a year. Also its very late in the day and it would be best for her to go to walk in clinic to be seen. Pt understood.

## 2018-04-20 NOTE — PROGRESS NOTES
Chief Complaint   Patient presents with   • Urinary Tract Infection     Subjective   My Chavez is a 22 y.o. female who presents to the clinic today with complaints urinary tract infection. She has not had sex in 3-4 years and just had sex two days ago with use of condom. She developed symptoms after this. She has had no fever, but has significant burning and pelvic discomfort. She has had UTIs before.   Urinary Tract Infection    This is a new problem. The current episode started in the past 7 days. The problem occurs every urination. The problem has been gradually worsening. The quality of the pain is described as burning. The pain is moderate. There has been no fever. She is sexually active. There is no history of pyelonephritis. Associated symptoms include frequency, hematuria and urgency. Pertinent negatives include no chills, discharge, flank pain, hesitancy, nausea, possible pregnancy, sweats or vomiting. Treatments tried: AZO. The treatment provided no relief. There is no history of catheterization, kidney stones, recurrent UTIs, a single kidney, urinary stasis or a urological procedure.         Current Outpatient Prescriptions:   •  ALPRAZolam (XANAX) 0.5 MG tablet, TAKE 1/2 TABLET THREE TIMES DAILY AS NEEDED (Patient taking differently: TAKE 1/2 TABLET THREE TIMES DAILY AS NEEDED FOR ANXIETY), Disp: 15 tablet, Rfl: 0  •  calcium carbonate EX (TUMS EX) 750 MG chewable tablet, Chew 1,500 mg 4 (Four) Times a Day As Needed for Indigestion or Heartburn., Disp: , Rfl:   •  citalopram (CeleXA) 20 MG tablet, TAKE ONE TABLET DAILY, Disp: 30 tablet, Rfl: 5  •  ferrous sulfate 325 (65 FE) MG tablet, Take 325 mg by mouth daily with breakfast., Disp: , Rfl:   •  pantoprazole (PROTONIX) 40 MG EC tablet, TAKE ONE TABLET DAILY, Disp: 30 tablet, Rfl: 5  •  phenazopyridine (PYRIDIUM) 200 MG tablet, Take 1 tablet by mouth 3 (Three) Times a Day As Needed for bladder spasms for up to 2 days., Disp: 6 tablet, Rfl: 0  •   sulfamethoxazole-trimethoprim (BACTRIM DS) 800-160 MG per tablet, Take 1 tablet by mouth 2 (Two) Times a Day for 7 days., Disp: 14 tablet, Rfl: 0    Allergies:  Contrave [naltrexone-bupropion hcl er]    Past Medical History:   Diagnosis Date   • Abdominal pain    • Acid reflux    • Anemia    • Anxiety    • Asthma     POLLEN CAN CAUSE SYMPTOMS NO ASTHMA FOR 8 YEARS   • Depression    • Diarrhea    • Gallbladder abscess    • GERD (gastroesophageal reflux disease)    • Nausea and/or vomiting    • Seasonal allergies      Past Surgical History:   Procedure Laterality Date   • CHOLECYSTECTOMY  04/13/2018   • CHOLECYSTECTOMY WITH INTRAOPERATIVE CHOLANGIOGRAM N/A 4/13/2018    Procedure: LAPAROSCOPIC CHOLECYSTECTOMY  WITH CHOLANGIOGRAM;  Surgeon: Martita Gunter MD;  Location: [facility] OR;  Service: General   • COLONOSCOPY N/A 10/2/2017    Procedure: COLONOSCOPY WITH ANESTHESIA;  Surgeon: Manoj Madden DO;  Location: [facility] ENDOSCOPY;  Service:    • ENDOSCOPY N/A 10/2/2017    Procedure: ESOPHAGOGASTRODUODENOSCOPY WITH ANESTHESIA;  Surgeon: Manoj Madden DO;  Location: [facility] ENDOSCOPY;  Service:    • TONSILLECTOMY       Family History   Problem Relation Age of Onset   • No Known Problems Mother    • No Known Problems Father    • No Known Problems Brother    • Colon cancer Neg Hx    • Esophageal cancer Neg Hx      Social History   Substance Use Topics   • Smoking status: Former Smoker     Packs/day: 0.10     Types: Cigarettes     Start date: 9/27/2014     Quit date: 10/27/2016   • Smokeless tobacco: Never Used   • Alcohol use 0.6 oz/week     1 Glasses of wine per week      Comment: occ        Review of Systems  Review of Systems   Constitutional: Negative for chills.   Gastrointestinal: Negative for nausea and vomiting.   Genitourinary: Positive for frequency, hematuria and urgency. Negative for flank pain and hesitancy.       Objective   /76 (BP Location: Left arm, Patient Position: Sitting, Cuff Size: Large  Adult)   Pulse 90   Temp 98.4 °F (36.9 °C) (Tympanic)   Resp 18   LMP 03/21/2018 (Exact Date)   SpO2 98%       Physical Exam   Constitutional: She is oriented to person, place, and time. She appears well-developed and well-nourished.   HENT:   Head: Normocephalic and atraumatic.   Eyes: Pupils are equal, round, and reactive to light.   Neck: Normal range of motion. Neck supple.   Cardiovascular: Normal rate, regular rhythm and normal heart sounds.    Pulmonary/Chest: Effort normal and breath sounds normal. No stridor.   Abdominal: Soft. Bowel sounds are normal. She exhibits no distension and no mass. There is tenderness (pelvic with urge to urinate with palpation,). There is no rebound and no guarding. No hernia.   Lymphadenopathy:     She has no cervical adenopathy.   Neurological: She is oriented to person, place, and time.   Skin: Skin is warm and dry.   Psychiatric: She has a normal mood and affect. Her behavior is normal.   Vitals reviewed.      Assessment/Plan     My was seen today for urinary tract infection.    Diagnoses and all orders for this visit:    Acute cystitis with hematuria    Other orders  -     sulfamethoxazole-trimethoprim (BACTRIM DS) 800-160 MG per tablet; Take 1 tablet by mouth 2 (Two) Times a Day for 7 days.  -     phenazopyridine (PYRIDIUM) 200 MG tablet; Take 1 tablet by mouth 3 (Three) Times a Day As Needed for bladder spasms for up to 2 days.    Follow up for urine culture if symptoms persist.   Drink plenty of water

## 2018-04-20 NOTE — PATIENT INSTRUCTIONS
Drink plenty of fluids      Urinary Tract Infection, Adult  A urinary tract infection (UTI) is an infection of any part of the urinary tract, which includes the kidneys, ureters, bladder, and urethra. These organs make, store, and get rid of urine in the body. UTI can be a bladder infection (cystitis) or kidney infection (pyelonephritis).  What are the causes?  This infection may be caused by fungi, viruses, or bacteria. Bacteria are the most common cause of UTIs. This condition can also be caused by repeated incomplete emptying of the bladder during urination.  What increases the risk?  This condition is more likely to develop if:  · You ignore your need to urinate or hold urine for long periods of time.  · You do not empty your bladder completely during urination.  · You wipe back to front after urinating or having a bowel movement, if you are female.  · You are uncircumcised, if you are male.  · You are constipated.  · You have a urinary catheter that stays in place (indwelling).  · You have a weak defense (immune) system.  · You have a medical condition that affects your bowels, kidneys, or bladder.  · You have diabetes.  · You take antibiotic medicines frequently or for long periods of time, and the antibiotics no longer work well against certain types of infections (antibiotic resistance).  · You take medicines that irritate your urinary tract.  · You are exposed to chemicals that irritate your urinary tract.  · You are female.  What are the signs or symptoms?  Symptoms of this condition include:  · Fever.  · Frequent urination or passing small amounts of urine frequently.  · Needing to urinate urgently.  · Pain or burning with urination.  · Urine that smells bad or unusual.  · Cloudy urine.  · Pain in the lower abdomen or back.  · Trouble urinating.  · Blood in the urine.  · Vomiting or being less hungry than normal.  · Diarrhea or abdominal pain.  · Vaginal discharge, if you are female.  How is this  diagnosed?  This condition is diagnosed with a medical history and physical exam. You will also need to provide a urine sample to test your urine. Other tests may be done, including:  · Blood tests.  · Sexually transmitted disease (STD) testing.  If you have had more than one UTI, a cystoscopy or imaging studies may be done to determine the cause of the infections.  How is this treated?  Treatment for this condition often includes a combination of two or more of the following:  · Antibiotic medicine.  · Other medicines to treat less common causes of UTI.  · Over-the-counter medicines to treat pain.  · Drinking enough water to stay hydrated.  Follow these instructions at home:  · Take over-the-counter and prescription medicines only as told by your health care provider.  · If you were prescribed an antibiotic, take it as told by your health care provider. Do not stop taking the antibiotic even if you start to feel better.  · Avoid alcohol, caffeine, tea, and carbonated beverages. They can irritate your bladder.  · Drink enough fluid to keep your urine clear or pale yellow.  · Keep all follow-up visits as told by your health care provider. This is important.  · Make sure to:  ¨ Empty your bladder often and completely. Do not hold urine for long periods of time.  ¨ Empty your bladder before and after sex.  ¨ Wipe from front to back after a bowel movement if you are female. Use each tissue one time when you wipe.  Contact a health care provider if:  · You have back pain.  · You have a fever.  · You feel nauseous or vomit.  · Your symptoms do not get better after 3 days.  · Your symptoms go away and then return.  Get help right away if:  · You have severe back pain or lower abdominal pain.  · You are vomiting and cannot keep down any medicines or water.  This information is not intended to replace advice given to you by your health care provider. Make sure you discuss any questions you have with your health care  provider.  Document Released: 09/27/2006 Document Revised: 05/31/2017 Document Reviewed: 11/07/2016  [employer] Interactive Patient Education © 2017 [employer]

## 2018-04-30 RX ORDER — ALPRAZOLAM 0.5 MG/1
TABLET ORAL
Qty: 15 TABLET | Refills: 0 | Status: SHIPPED | OUTPATIENT
Start: 2018-04-30 | End: 2018-06-28 | Stop reason: SDUPTHER

## 2018-05-31 ENCOUNTER — OFFICE VISIT (OUTPATIENT)
Dept: FAMILY MEDICINE CLINIC | Facility: CLINIC | Age: 23
End: 2018-05-31

## 2018-05-31 VITALS
DIASTOLIC BLOOD PRESSURE: 70 MMHG | OXYGEN SATURATION: 98 % | SYSTOLIC BLOOD PRESSURE: 120 MMHG | HEART RATE: 110 BPM | TEMPERATURE: 98.6 F | WEIGHT: 286 LBS | RESPIRATION RATE: 18 BRPM | HEIGHT: 67 IN | BODY MASS INDEX: 44.89 KG/M2

## 2018-05-31 DIAGNOSIS — E66.9 OBESITY, UNSPECIFIED CLASSIFICATION, UNSPECIFIED OBESITY TYPE, UNSPECIFIED WHETHER SERIOUS COMORBIDITY PRESENT: Chronic | ICD-10-CM

## 2018-05-31 DIAGNOSIS — K90.49 FOOD INTOLERANCE: Primary | ICD-10-CM

## 2018-05-31 DIAGNOSIS — F41.9 ANXIETY: Chronic | ICD-10-CM

## 2018-05-31 DIAGNOSIS — F32.A DEPRESSION, UNSPECIFIED DEPRESSION TYPE: Chronic | ICD-10-CM

## 2018-05-31 DIAGNOSIS — K21.9 GASTROESOPHAGEAL REFLUX DISEASE, ESOPHAGITIS PRESENCE NOT SPECIFIED: ICD-10-CM

## 2018-05-31 DIAGNOSIS — Z01.89 LABORATORY TEST: ICD-10-CM

## 2018-05-31 DIAGNOSIS — D64.9 ANEMIA, UNSPECIFIED TYPE: ICD-10-CM

## 2018-05-31 PROCEDURE — 99214 OFFICE O/P EST MOD 30 MIN: CPT | Performed by: FAMILY MEDICINE

## 2018-06-01 RX ORDER — PHENTERMINE HYDROCHLORIDE 15 MG/1
15 CAPSULE ORAL EVERY MORNING
Qty: 30 CAPSULE | Refills: 1 | Status: SHIPPED | OUTPATIENT
Start: 2018-06-01 | End: 2018-08-10 | Stop reason: SDUPTHER

## 2018-06-15 ENCOUNTER — TELEPHONE (OUTPATIENT)
Dept: FAMILY MEDICINE CLINIC | Facility: CLINIC | Age: 23
End: 2018-06-15

## 2018-06-15 NOTE — TELEPHONE ENCOUNTER
"Vm \"Dr Nuñez wanted me to call every Friday and report my b/p and weight . My b/p 118/76, weight 275.4\"  "

## 2018-06-28 DIAGNOSIS — D64.9 ANEMIA, UNSPECIFIED TYPE: ICD-10-CM

## 2018-06-28 DIAGNOSIS — E66.9 OBESITY, UNSPECIFIED OBESITY SEVERITY, UNSPECIFIED OBESITY TYPE: Chronic | ICD-10-CM

## 2018-06-28 RX ORDER — CITALOPRAM 20 MG/1
20 TABLET ORAL DAILY
Qty: 90 TABLET | Refills: 1 | Status: SHIPPED | OUTPATIENT
Start: 2018-06-28 | End: 2018-11-12 | Stop reason: SDUPTHER

## 2018-06-28 RX ORDER — PANTOPRAZOLE SODIUM 40 MG/1
40 TABLET, DELAYED RELEASE ORAL DAILY
Qty: 90 TABLET | Refills: 1 | Status: SHIPPED | OUTPATIENT
Start: 2018-06-28 | End: 2018-12-14 | Stop reason: SDUPTHER

## 2018-06-28 RX ORDER — ALPRAZOLAM 0.5 MG/1
0.25 TABLET ORAL 3 TIMES DAILY PRN
Qty: 45 TABLET | Refills: 1 | Status: SHIPPED | OUTPATIENT
Start: 2018-06-28 | End: 2019-04-08 | Stop reason: SDUPTHER

## 2018-06-28 NOTE — TELEPHONE ENCOUNTER
Xanax refill done per protocol last refill was 4/30/18 [place] En[facility] vargas and also needs refill on Celexa and protonix

## 2018-07-02 ENCOUNTER — TELEPHONE (OUTPATIENT)
Dept: FAMILY MEDICINE CLINIC | Facility: CLINIC | Age: 23
End: 2018-07-02

## 2018-07-19 ENCOUNTER — OFFICE VISIT (OUTPATIENT)
Dept: FAMILY MEDICINE CLINIC | Facility: CLINIC | Age: 23
End: 2018-07-19

## 2018-07-19 VITALS
HEIGHT: 67 IN | BODY MASS INDEX: 42.69 KG/M2 | DIASTOLIC BLOOD PRESSURE: 78 MMHG | SYSTOLIC BLOOD PRESSURE: 118 MMHG | WEIGHT: 272 LBS | RESPIRATION RATE: 16 BRPM | TEMPERATURE: 98.7 F | OXYGEN SATURATION: 99 % | HEART RATE: 84 BPM

## 2018-07-19 DIAGNOSIS — D64.9 ANEMIA, UNSPECIFIED TYPE: ICD-10-CM

## 2018-07-19 DIAGNOSIS — E66.9 OBESITY, UNSPECIFIED CLASSIFICATION, UNSPECIFIED OBESITY TYPE, UNSPECIFIED WHETHER SERIOUS COMORBIDITY PRESENT: Primary | Chronic | ICD-10-CM

## 2018-07-19 DIAGNOSIS — E61.1 IRON DEFICIENCY: ICD-10-CM

## 2018-07-19 PROCEDURE — 99213 OFFICE O/P EST LOW 20 MIN: CPT | Performed by: FAMILY MEDICINE

## 2018-07-19 NOTE — PROGRESS NOTES
"Subjective   My Chavez is a 23 y.o. female presenting with chief complaint of:   Chief Complaint   Patient presents with   • Follow-up     \"my weight loss\"       History of Present Illness :  Alone.   Has multiple chronic problems to consider that might have a bearing on today's issues;  an interval appointment.  Losing weight by less calories but 3 meals a day/emphasis protein.  Some exercise; with membership at gym.  Using phentermine 15 mg without palpitations, HA, insomnia, tremor. Goal weight #200.     Chronic/acute problems to review today:   1. Obesity, unspecified classification, unspecified obesity type, unspecified whether serious comorbidity present: chronic/improving with weight loss    2. Anemia, unspecified type: chronic/variable and benign to date.   3. Iron deficiency: chronic/variable and benign to date.      Has an/another acute issue today: none.    The following portions of the patient's history were reviewed and updated as appropriate: allergies, current medications, past family history, past medical history, past social history, past surgical history and problem list.  Records acquired and reviewed; TCC migrated.      Current Outpatient Prescriptions:   •  ALPRAZolam (XANAX) 0.5 MG tablet, Take 0.5 tablets by mouth 3 (Three) Times a Day As Needed for Anxiety., Disp: 45 tablet, Rfl: 1  •  citalopram (CeleXA) 20 MG tablet, Take 1 tablet by mouth Daily., Disp: 90 tablet, Rfl: 1  •  ferrous sulfate 325 (65 FE) MG tablet, Take 325 mg by mouth daily with breakfast., Disp: , Rfl:   •  pantoprazole (PROTONIX) 40 MG EC tablet, Take 1 tablet by mouth Daily., Disp: 90 tablet, Rfl: 1  •  phentermine 15 MG capsule, Take 1 capsule by mouth Every Morning., Disp: 30 capsule, Rfl: 1    No problems with medications.  Refills if needed done    Allergies   Allergen Reactions   • Contrave [Naltrexone-Bupropion Hcl Er] Nausea Only     Nausea         Review of Systems  GENERAL:  Active home/work; " minimal/infrequent exercise. Sleep is ok; denies apnea. No fever now.  SKIN: No  rash/skin lesion of concern:   ENDO:  No syncope, near or diaphoretic sweaty spells.  HEENT: No recent head injury;  headache,  No vision change.  No hearing loss.  Ears without pain/drainage.  No sore throat.  No nasal/sinus congestion/drainage. No epistaxis.  CHEST: No chest wall tenderness or mass. No cough, without wheeze.  No SOB; no hemoptysis.  CV: No chest pain, palpitations, ankle edema.  GI: No dysphagia.  No abdominal pain, diarrhea, constipation.  No rectal bleeding, or melena.  Happy she had gb surgery; resolved bloating/fullness.  Occ heartburn as above.   :  Voids without dysuria, or  incontinence to completion.  GYN: Menses heavy; has gyne  ORTHO: No painful/swollen joints but various on /off sore.  No  sore neck or back.  No acute neck or back pain without recent injury.  NEURO: No dizziness, weakness of extremities.  No numbness/paresthesias.   PSYCH: No memory loss.  Mood good; variable anxious, depressed but/and not suicidal.  Tries to tolerate stress .   Screening:  Mammogram: NA  Bone density: NA  Low dose CT chest: NA  GI:   Colon-neg/Chano//10.2.17  EGD-itis/Chano//10.2.17  Prostate: NA  Usual lab order  NA    Results for orders placed or performed in visit on 04/20/18   POCT urinalysis dipstick, automated   Result Value Ref Range    Color Orange (A) Yellow, Straw, Dark Yellow, Deborah    Clarity, UA Cloudy (A) Clear    Glucose, UA 1+ (A) Negative, 1000 mg/dL (3+) mg/dL    Bilirubin Small (1+) (A) Negative    Ketones, UA Trace (A) Negative    Specific Gravity  1.030 1.005 - 1.030    Blood, UA Small (A) Negative    pH, Urine 6.5 5.0 - 8.0    Protein, POC Negative Negative mg/dL    Urobilinogen, UA 1 E.U./dL  (A) Normal    Leukocytes Large (3+) (A) Negative    Nitrite, UA Positive (A) Negative       No results found for: PSA     Lab Results:  CBC:    Lab Results - Last 18 Months  Lab Units 04/06/18  5038  "11/22/17  0920 09/16/17 2128   WBC 10*3/mm3 7.70 8.15 8.87   HEMOGLOBIN g/dL 11.9* 11.9* 12.5   HEMATOCRIT % 38.8 38.2 38.2   PLATELETS 10*3/mm3 302 303 291   IRON mcg/dL  --  34*  --       BMP/CMP:    Lab Results - Last 18 Months  Lab Units 04/06/18  1444 09/16/17 2128   SODIUM mmol/L 141 141   POTASSIUM mmol/L 4.4 4.3   CHLORIDE mmol/L 103 108   CO2 mmol/L  --  22.0*   TOTAL CO2, ARTERIAL mmol/L 26.0  --    GLUCOSE mg/dL 90  --    BUN mg/dL 10 14   CREATININE mg/dL 0.63 0.68   EGFR IF NONAFRICN AM mL/min/1.73 118 108   EGFR IF AFRICN AM mL/min/1.73 143  --    CALCIUM mg/dL 9.6 9.5     HEPATIC:    Lab Results - Last 18 Months  Lab Units 04/06/18 1444 09/16/17 2128   ALT (SGPT) U/L 41 50   AST (SGOT) U/L 24 38   ALK PHOS U/L 73 79     THYROID:No results for input(s): TSH, T3FREE, FREET4, FTI in the last 11733 hours.    Invalid input(s): T3, T4, TEUP  A1C:No results for input(s): HGBA1C in the last 68830 hours.  PSA:No results for input(s): PSA in the last 46059 hours.    Objective   /78 (BP Location: Right arm, Patient Position: Sitting, Cuff Size: Large Adult)   Pulse 84   Temp 98.7 °F (37.1 °C) (Oral)   Resp 16   Ht 170.2 cm (67\")   Wt 123 kg (272 lb)   LMP 06/22/2018 (Exact Date)   SpO2 99%   Breastfeeding? No   BMI 42.60 kg/m²   Body mass index is 42.6 kg/m².    Physical Exam  GENERAL:  Well nourished/developed in no acute distress.   SKIN: Turgor excellent, without wound, rash, lesion.  HEENT: Normal cephalic without trauma.  Pupils equal round reactive to light. Extraocular motions full without nystagmus.   External canals nonobstructive nontender without reddness. Tymphatic membranes raman with cheikh structures intact.   Oral cavity without growths, exudates, and moist.  Posterior pharynx without mass, obstruction, redness.  No thyromegaly, mass, tenderness, lymphadenopathy and supple.  CV: Regular rhythm.  No murmur, gallop,  edema. Posterior pulses intact.  No carotid bruits.  CHEST: No " chest wall tenderness or mass.   LUNGS: Symmetric motion with clear to auscultation.  No dullness to percussion  ABD: Soft, nontender without mass.   ORTHO: Symmetric extremities without swelling/point tenderness.  Full gross range of motion.  NEURO: CN 2-12 grossly intact.  Symmetric facies and UE/LE. 3/5 strength throughout. 2/4 x bicep knee equal reflexes.  Nonfocal use extremities. Speech clear. Intact light touch with monofilament, vibratory sensation with tuning fork; equal toes/distal feet.    PSYCH: Oriented x 3.  Pleasant calm, well kept.  Purposeful/directed conservation with intact short/long gross memory.     Assessment/Plan     1. Obesity, unspecified classification, unspecified obesity type, unspecified whether serious comorbidity present    2. Anemia, unspecified type    3. Iron deficiency        Rx: reviewed/changes:  same    LAB/Testing/Referrals: reviewed/orders:   Today:   No orders of the defined types were placed in this encounter.      Discussions:   As long as losing, tolerating will continue Rx  Warned; typical to have plateua with such aggressive goal of another 70#  Body mass index is 42.6 kg/m².   Patient's Body mass index is 42.6 kg/m². BMI is above normal parameters. Recommendations include: exercise counseling, nutrition counseling and pharmacological intervention.  Non-smoker      Patient Instructions   Really suggest using calorie counting.       Follow up: Return for Dr Nuñez-.  Future Appointments  Date Time Provider Department Center   7/20/2018 4:00 PM LAB [facility] None   10/25/2018 4:00 PM MD S[facility] None

## 2018-07-20 DIAGNOSIS — Z00.00 WELLNESS EXAMINATION: Primary | ICD-10-CM

## 2018-07-20 DIAGNOSIS — E61.1 IRON DEFICIENCY: ICD-10-CM

## 2018-07-20 DIAGNOSIS — K90.49 FOOD INTOLERANCE: ICD-10-CM

## 2018-07-20 DIAGNOSIS — D64.9 ANEMIA, UNSPECIFIED TYPE: ICD-10-CM

## 2018-07-20 DIAGNOSIS — F41.9 ANXIETY: Chronic | ICD-10-CM

## 2018-07-20 DIAGNOSIS — K62.5 RECTAL BLEEDING: ICD-10-CM

## 2018-07-20 DIAGNOSIS — F32.A DEPRESSION, UNSPECIFIED DEPRESSION TYPE: Chronic | ICD-10-CM

## 2018-07-20 DIAGNOSIS — E66.9 OBESITY, UNSPECIFIED CLASSIFICATION, UNSPECIFIED OBESITY TYPE, UNSPECIFIED WHETHER SERIOUS COMORBIDITY PRESENT: Chronic | ICD-10-CM

## 2018-08-10 DIAGNOSIS — E66.9 OBESITY, UNSPECIFIED CLASSIFICATION, UNSPECIFIED OBESITY TYPE, UNSPECIFIED WHETHER SERIOUS COMORBIDITY PRESENT: Primary | Chronic | ICD-10-CM

## 2018-08-10 RX ORDER — PHENTERMINE HYDROCHLORIDE 15 MG/1
CAPSULE ORAL
Qty: 30 CAPSULE | Refills: 1 | Status: SHIPPED | OUTPATIENT
Start: 2018-08-10 | End: 2018-10-23 | Stop reason: SDUPTHER

## 2018-09-21 ENCOUNTER — TELEPHONE (OUTPATIENT)
Dept: FAMILY MEDICINE CLINIC | Facility: CLINIC | Age: 23
End: 2018-09-21

## 2018-09-21 RX ORDER — NYSTATIN AND TRIAMCINOLONE ACETONIDE 100000; 1 [USP'U]/G; MG/G
OINTMENT TOPICAL 3 TIMES DAILY
Qty: 60 G | Refills: 0 | Status: SHIPPED | OUTPATIENT
Start: 2018-09-21 | End: 2018-12-13

## 2018-09-21 RX ORDER — FLUCONAZOLE 100 MG/1
100 TABLET ORAL DAILY
Qty: 3 TABLET | Refills: 0 | Status: SHIPPED | OUTPATIENT
Start: 2018-09-21 | End: 2018-11-13

## 2018-09-21 NOTE — TELEPHONE ENCOUNTER
"\"I have lost weight and my belly is sagging and where I have been working out and sweating, has it in a rash like, and I have been trying OTC Monistat for 3-4 days and it isnt helping\" advised pt Dr Nuñez is out office and will check with Dr Donato? Pt prefers cream and the pill if she can get it?  "

## 2018-10-23 DIAGNOSIS — E66.9 OBESITY, UNSPECIFIED CLASSIFICATION, UNSPECIFIED OBESITY TYPE, UNSPECIFIED WHETHER SERIOUS COMORBIDITY PRESENT: Chronic | ICD-10-CM

## 2018-10-26 RX ORDER — PHENTERMINE HYDROCHLORIDE 15 MG/1
CAPSULE ORAL
Qty: 30 CAPSULE | Refills: 5 | Status: SHIPPED | OUTPATIENT
Start: 2018-10-26 | End: 2019-06-18

## 2018-11-13 RX ORDER — CITALOPRAM 20 MG/1
TABLET ORAL
Qty: 90 TABLET | Refills: 1 | Status: SHIPPED | OUTPATIENT
Start: 2018-11-13 | End: 2019-06-12 | Stop reason: SDUPTHER

## 2018-12-13 ENCOUNTER — TELEPHONE (OUTPATIENT)
Dept: FAMILY MEDICINE CLINIC | Facility: CLINIC | Age: 23
End: 2018-12-13

## 2018-12-13 ENCOUNTER — OFFICE VISIT (OUTPATIENT)
Dept: FAMILY MEDICINE CLINIC | Facility: CLINIC | Age: 23
End: 2018-12-13

## 2018-12-13 VITALS
SYSTOLIC BLOOD PRESSURE: 104 MMHG | HEART RATE: 107 BPM | WEIGHT: 254 LBS | DIASTOLIC BLOOD PRESSURE: 70 MMHG | HEIGHT: 67 IN | RESPIRATION RATE: 20 BRPM | OXYGEN SATURATION: 98 % | TEMPERATURE: 98.3 F | BODY MASS INDEX: 39.87 KG/M2

## 2018-12-13 DIAGNOSIS — Z87.09 HISTORY OF ASTHMA: ICD-10-CM

## 2018-12-13 DIAGNOSIS — R05.9 COUGH: ICD-10-CM

## 2018-12-13 DIAGNOSIS — J32.9 SINUSITIS, UNSPECIFIED CHRONICITY, UNSPECIFIED LOCATION: ICD-10-CM

## 2018-12-13 DIAGNOSIS — J40 BRONCHITIS: ICD-10-CM

## 2018-12-13 PROCEDURE — 99213 OFFICE O/P EST LOW 20 MIN: CPT | Performed by: FAMILY MEDICINE

## 2018-12-13 RX ORDER — ACETAMINOPHEN 325 MG/1
650 TABLET ORAL EVERY 6 HOURS PRN
COMMUNITY

## 2018-12-13 RX ORDER — ALBUTEROL SULFATE 90 UG/1
2 AEROSOL, METERED RESPIRATORY (INHALATION)
Qty: 1 INHALER | Refills: 0 | Status: SHIPPED | OUTPATIENT
Start: 2018-12-13 | End: 2019-02-05

## 2018-12-13 RX ORDER — METHYLPREDNISOLONE 4 MG/1
TABLET ORAL
Qty: 1 EACH | Refills: 0 | Status: SHIPPED | OUTPATIENT
Start: 2018-12-13 | End: 2019-01-29

## 2018-12-13 RX ORDER — AMOXICILLIN AND CLAVULANATE POTASSIUM 875; 125 MG/1; MG/1
1 TABLET, FILM COATED ORAL 2 TIMES DAILY
Qty: 20 TABLET | Refills: 0 | Status: SHIPPED | OUTPATIENT
Start: 2018-12-13 | End: 2019-01-29

## 2018-12-13 NOTE — TELEPHONE ENCOUNTER
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

## 2018-12-13 NOTE — TELEPHONE ENCOUNTER
She says her chest burns and hurts. She said she tested positive for flu A and B 4 wks ago. Still feels bad and running a fever.Wanted to know if she needs to go to a walk in clinic or can be seen here?

## 2018-12-13 NOTE — TELEPHONE ENCOUNTER
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

## 2018-12-14 DIAGNOSIS — E66.9 OBESITY, UNSPECIFIED OBESITY SEVERITY, UNSPECIFIED OBESITY TYPE: Chronic | ICD-10-CM

## 2018-12-14 DIAGNOSIS — D64.9 ANEMIA, UNSPECIFIED TYPE: ICD-10-CM

## 2018-12-14 RX ORDER — PANTOPRAZOLE SODIUM 40 MG/1
TABLET, DELAYED RELEASE ORAL
Qty: 90 TABLET | Refills: 1 | Status: SHIPPED | OUTPATIENT
Start: 2018-12-14 | End: 2019-06-12 | Stop reason: SDUPTHER

## 2018-12-14 NOTE — PROGRESS NOTES
"Subjective   My Chavez is a 23 y.o. female presenting with chief complaint of:   Chief Complaint   Patient presents with   • URI     Sore throat sinus congestion started Friday, Cough started yesterday. 100 temp at 2pm.       History of Present Illness :  Alone.  Here for primarily an acute issue today; above.   Has multiple chronic problems to consider that might have a bearing on today's issues; not an interval appointment.   Phone message earlier today:     Vm \"I need something called in or be seen this afternoon or early tomorrow? 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     Chronic/acute problems reviewed today:   1. History of asthma: chronic/childhood and has not been a problem for years.  But with this having wheeze/cough.  No sob.    2. Cough: acute/same   3. Bronchitis: acute/same   4. Sinusitis, unspecified chronicity, unspecified location: acute/same.      Has an/another acute issue today: none.    The following portions of the patient's history were reviewed and updated as appropriate: allergies, current medications, past family history, past medical history, past social history, past surgical history and problem list.      Current Outpatient Medications:   •  acetaminophen (TYLENOL) 325 MG tablet, Take 650 mg by mouth Every 6 (Six) Hours As Needed for Mild Pain  (Takes 2 tablets as needed.)., Disp: , Rfl:   •  ALPRAZolam (XANAX) 0.5 MG tablet, Take 0.5 tablets by mouth 3 (Three) Times a Day As Needed for Anxiety., Disp: 45 tablet, Rfl: 1  •  citalopram (CeleXA) 20 MG " tablet, TAKE 1 TABLET DAILY, Disp: 90 tablet, Rfl: 1  •  ferrous sulfate 325 (65 FE) MG tablet, Take 325 mg by mouth daily with breakfast., Disp: , Rfl:   •  pantoprazole (PROTONIX) 40 MG EC tablet, Take 1 tablet by mouth Daily., Disp: 90 tablet, Rfl: 1  •  phentermine 15 MG capsule, TAKE ONE CAPSULE EVERY MORNING, Disp: 30 capsule, Rfl: 5  •  Pseudoeph-Doxylamine-DM-APAP (NYQUIL PO), Take  by mouth As Needed., Disp: , Rfl:   •  Pseudoephedrine-APAP-DM (DAYQUIL MULTI-SYMPTOM COLD/FLU PO), Take 1 tablet by mouth., Disp: , Rfl:     No problems with medications.  Refills if needed done    Allergies   Allergen Reactions   • Contrave [Naltrexone-Bupropion Hcl Er] Nausea Only     Nausea         Review of Systems  GENERAL:  Active home/work; minimal/infrequent exercise till this. Sleep is ok usually; interrupted by cough with this;  denies apnea. No fever now.  SKIN: No  rash/skin lesion of concern:   ENDO:  No syncope, near or diaphoretic sweaty spells.  HEENT: No recent head injury; mild diffuse headache,  No vision change.  No hearing loss.  Ears without pain/drainage.  Mild scratchy/sore throat.  Increased nasal/sinus congestion/drainage. No epistaxis.  CHEST: No chest wall tenderness or mass.,.  Frequent dry cough, with occ wheeze.  No SOB; no hemoptysis.  CV: No chest pain, palpitations, ankle edema.  GI: No dysphagia.  No abdominal pain, diarrhea, constipation.  No rectal bleeding, or melena.  Occ heartburn as above.   :  Voids without dysuria, or  incontinence to completion.  GYN: Menses heavy; has gyne  ORTHO: No painful/swollen joints but various on /off sore.  No  sore neck or back.  No acute neck or back pain without recent injury.  NEURO: No dizziness, weakness of extremities.  No numbness/paresthesias.   PSYCH: No memory loss.  Mood good; variable anxious, depressed but/and not suicidal.  Tries to tolerate stress .   Screening:  Mammogram: NA  Bone density: NA  Low dose CT chest: NA  GI:    Colon-neg/[place]//10.2.17  EGD-itis/[place]//10.2.17  Prostate: NA  Usual lab order  NA    Results for orders placed or performed in visit on 04/20/18   POCT urinalysis dipstick, automated   Result Value Ref Range    Color Orange (A) Yellow, Straw, Dark Yellow, Deborah    Clarity, UA Cloudy (A) Clear    Glucose, UA 1+ (A) Negative, 1000 mg/dL (3+) mg/dL    Bilirubin Small (1+) (A) Negative    Ketones, UA Trace (A) Negative    Specific Gravity  1.030 1.005 - 1.030    Blood, UA Small (A) Negative    pH, Urine 6.5 5.0 - 8.0    Protein, POC Negative Negative mg/dL    Urobilinogen, UA 1 E.U./dL  (A) Normal    Leukocytes Large (3+) (A) Negative    Nitrite, UA Positive (A) Negative       No results found for: PSA     Lab Results:  CBC:  Lab Results - Last 18 Months   Lab Units  04/06/18   1444  11/22/17   0920  09/16/17   2128   WBC 10*3/mm3  7.70  8.15  8.87   HEMOGLOBIN g/dL  11.9*  11.9*  12.5   HEMATOCRIT %  38.8  38.2  38.2   PLATELETS 10*3/mm3  302  303  291   IRON mcg/dL   --   34*   --       BMP/CMP:  Lab Results - Last 18 Months   Lab Units  04/06/18   1444  09/16/17   2128   SODIUM mmol/L  141  141   POTASSIUM mmol/L  4.4  4.3   CHLORIDE mmol/L  103  108   CO2 mmol/L   --   22.0*   TOTAL CO2 mmol/L  26.0   --    GLUCOSE mg/dL  90   --    BUN mg/dL  10  14   CREATININE mg/dL  0.63  0.68   EGFR IF NONAFRICN AM mL/min/1.73  118  108   EGFR IF AFRICN AM mL/min/1.73  143   --    CALCIUM mg/dL  9.6  9.5     HEPATIC:  Lab Results - Last 18 Months   Lab Units  04/06/18   1444  09/16/17   2128   ALT (SGPT) U/L  41  50   AST (SGOT) U/L  24  38   ALK PHOS U/L  73  79     THYROID:No results for input(s): TSH, T3FREE, FTI in the last 16509 hours.    Invalid input(s): T4FREE, T3, T4, TEUP,  TOTALT4  A1C:No results for input(s): HGBA1C in the last 16594 hours.  PSA:No results for input(s): PSA in the last 46529 hours.    Objective   /70 (BP Location: Left arm, Patient Position: Sitting, Cuff Size: Large Adult)   Pulse  "107   Temp 98.3 °F (36.8 °C) (Oral)   Resp 20   Ht 170.2 cm (67\")   Wt 115 kg (254 lb)   SpO2 98%   BMI 39.78 kg/m²   Body mass index is 39.78 kg/m².    Physical Exam  GENERAL:  Well nourished/developed in no acute distress.   SKIN: Turgor excellent, without wound, rash, lesion.  HEENT: Normal cephalic without trauma.  Pupils equal round reactive to light. Extraocular motions full without nystagmus.   External canals nonobstructive nontender without reddness. Tymphatic membranes raman with cheikh structures intact.   Oral cavity without growths, exudates, and moist.  Posterior pharynx without mass, obstruction, redness.  No thyromegaly, mass, tenderness, lymphadenopathy and supple.  CV: Regular rhythm.  No murmur, gallop,  edema. Posterior pulses intact.  No carotid bruits.  CHEST: No chest wall tenderness or mass.   LUNGS: Symmetric motion with occ exp wheeze to auscultation; frequent dry cough.   No dullness to percussion  ABD: Soft, nontender without mass.   ORTHO: Symmetric extremities without swelling/point tenderness.  Full gross range of motion.  NEURO: CN 2-12 grossly intact.  Symmetric facies and UE/LE. 3/5 strength throughout. Nonfocal use extremities. Speech clear.   PSYCH: Oriented x 3.  Pleasant calm, well kept.  Purposeful/directed conservation with intact short/long gross memory.     Assessment/Plan     1. History of asthma    2. Cough    3. Bronchitis    4. Sinusitis, unspecified chronicity, unspecified location        Rx: reviewed/changes:  New Medications Ordered This Visit   Medications   • MethylPREDNISolone (MEDROL, MARIELENA,) 4 MG tablet     Sig: Take as directed on package instructions.     Dispense:  1 each     Refill:  0     Pharmacist-tell patient When using steroids Do not use anti-inflammatories such as Motrin/ibuprofen, Alleve/naprosyn, Mobic and like medications   • albuterol (PROAIR HFA) 108 (90 Base) MCG/ACT inhaler     Sig: Inhale 2 puffs 4 (Four) Times a Day.     Dispense:  1 " inhaler     Refill:  0   • amoxicillin-clavulanate (AUGMENTIN) 875-125 MG per tablet     Sig: Take 1 tablet by mouth 2 (Two) Times a Day.     Dispense:  20 tablet     Refill:  0       LAB/Testing/Referrals: reviewed/orders:   Today:   No orders of the defined types were placed in this encounter.      Discussions:   Calling in her weights  Body mass index is 39.78 kg/m².   Patient's Body mass index is 39.78 kg/m². BMI is above normal parameters. Recommendations include: exercise counseling, nutrition counseling, pharmacological intervention and as before.  Non-smoker      Patient Instructions   Call weight every 2-3 weeks.       Follow up: Return in about 6 months (around 6/13/2019) for at least ym.  Future Appointments   Date Time Provider Department Center   6/18/2019 10:45 AM Mak Nuñez MD [facility] None

## 2019-01-24 ENCOUNTER — TELEPHONE (OUTPATIENT)
Dept: FAMILY MEDICINE CLINIC | Facility: CLINIC | Age: 24
End: 2019-01-24

## 2019-01-29 ENCOUNTER — OFFICE VISIT (OUTPATIENT)
Dept: RETAIL CLINIC | Facility: CLINIC | Age: 24
End: 2019-01-29

## 2019-01-29 VITALS
RESPIRATION RATE: 18 BRPM | DIASTOLIC BLOOD PRESSURE: 78 MMHG | BODY MASS INDEX: 39.05 KG/M2 | HEIGHT: 67 IN | SYSTOLIC BLOOD PRESSURE: 110 MMHG | TEMPERATURE: 98 F | WEIGHT: 248.8 LBS | HEART RATE: 99 BPM | OXYGEN SATURATION: 98 %

## 2019-01-29 DIAGNOSIS — J01.00 ACUTE NON-RECURRENT MAXILLARY SINUSITIS: ICD-10-CM

## 2019-01-29 DIAGNOSIS — H66.001 ACUTE SUPPURATIVE OTITIS MEDIA OF RIGHT EAR WITHOUT SPONTANEOUS RUPTURE OF TYMPANIC MEMBRANE, RECURRENCE NOT SPECIFIED: Primary | ICD-10-CM

## 2019-01-29 PROCEDURE — 99213 OFFICE O/P EST LOW 20 MIN: CPT | Performed by: NURSE PRACTITIONER

## 2019-01-29 RX ORDER — CEFDINIR 300 MG/1
300 CAPSULE ORAL 2 TIMES DAILY
Qty: 20 CAPSULE | Refills: 0 | Status: SHIPPED | OUTPATIENT
Start: 2019-01-29 | End: 2019-02-08

## 2019-01-29 RX ORDER — FLUTICASONE PROPIONATE 50 MCG
2 SPRAY, SUSPENSION (ML) NASAL DAILY
Qty: 1 BOTTLE | Refills: 0 | Status: SHIPPED | OUTPATIENT
Start: 2019-01-29 | End: 2020-05-12

## 2019-01-29 NOTE — PROGRESS NOTES
Chief Complaint   Patient presents with   • Earache     Subjective   My Chavez is a 23 y.o. female who presents to the clinic today with complaints of right earache.  Earache    There is pain in the right ear. This is a new problem. Episode onset: has had URI symptoms since Friday, ear pain started yesterday. The problem has been gradually worsening. Maximum temperature: Friday only. Associated symptoms include coughing, diarrhea (a little watery stool) and a sore throat. Pertinent negatives include no abdominal pain, headaches or vomiting. She has tried acetaminophen for the symptoms. The treatment provided mild relief.     Current Outpatient Medications:   •  acetaminophen (TYLENOL) 325 MG tablet, Take 650 mg by mouth Every 6 (Six) Hours As Needed for Mild Pain  (Takes 2 tablets as needed.)., Disp: , Rfl:   •  ALPRAZolam (XANAX) 0.5 MG tablet, Take 0.5 tablets by mouth 3 (Three) Times a Day As Needed for Anxiety., Disp: 45 tablet, Rfl: 1  •  citalopram (CeleXA) 20 MG tablet, TAKE 1 TABLET DAILY, Disp: 90 tablet, Rfl: 1  •  ferrous sulfate 325 (65 FE) MG tablet, Take 325 mg by mouth daily with breakfast., Disp: , Rfl:   •  pantoprazole (PROTONIX) 40 MG EC tablet, TAKE 1 TABLET DAILY, Disp: 90 tablet, Rfl: 1  •  phentermine 15 MG capsule, TAKE ONE CAPSULE EVERY MORNING, Disp: 30 capsule, Rfl: 5  •  albuterol (PROAIR HFA) 108 (90 Base) MCG/ACT inhaler, Inhale 2 puffs 4 (Four) Times a Day., Disp: 1 inhaler, Rfl: 0     Allergies:  Contrave [naltrexone-bupropion hcl er]    Past Medical History:   Diagnosis Date   • Abdominal pain    • Acid reflux    • Anemia    • Anxiety    • Asthma     POLLEN CAN CAUSE SYMPTOMS NO ASTHMA FOR 8 YEARS   • Depression    • Diarrhea    • Gallbladder abscess    • GERD (gastroesophageal reflux disease)    • Nausea and/or vomiting    • Seasonal allergies      Past Surgical History:   Procedure Laterality Date   • CHOLECYSTECTOMY  04/13/2018   • CHOLECYSTECTOMY WITH INTRAOPERATIVE  "CHOLANGIOGRAM N/A 2018    Procedure: LAPAROSCOPIC CHOLECYSTECTOMY  WITH CHOLANGIOGRAM;  Surgeon: Martita Gunter MD;  Location: [facility] OR;  Service: General   • COLONOSCOPY N/A 10/2/2017    Procedure: COLONOSCOPY WITH ANESTHESIA;  Surgeon: Manoj Madden DO;  Location: [facility] ENDOSCOPY;  Service:    • ENDOSCOPY N/A 10/2/2017    Procedure: ESOPHAGOGASTRODUODENOSCOPY WITH ANESTHESIA;  Surgeon: Manoj Madden DO;  Location: [facility] ENDOSCOPY;  Service:    • TONSILLECTOMY       Family History   Problem Relation Age of Onset   • Hypertension Mother    • Hypertension Father    • No Known Problems Brother    • Colon cancer Neg Hx    • Esophageal cancer Neg Hx      Social History     Tobacco Use   • Smoking status: Former Smoker     Packs/day: 0.10     Types: Cigarettes     Start date: 2014     Last attempt to quit: 10/27/2016     Years since quittin.2   • Smokeless tobacco: Never Used   Substance Use Topics   • Alcohol use: Yes     Alcohol/week: 0.6 oz     Types: 1 Glasses of wine per week     Comment: occ    • Drug use: No       Review of Systems  Review of Systems   Constitutional: Negative for chills, fatigue and fever (none since Friday).   HENT: Positive for ear pain, postnasal drip, sinus pressure and sore throat. Negative for trouble swallowing.    Respiratory: Positive for cough. Negative for shortness of breath and wheezing.    Gastrointestinal: Positive for diarrhea (a little watery stool). Negative for abdominal pain, nausea and vomiting.   Allergic/Immunologic: Positive for environmental allergies.   Neurological: Negative for headaches.       Objective   /78   Pulse 99   Temp 98 °F (36.7 °C) (Oral)   Resp 18   Ht 170.2 cm (67\")   Wt 113 kg (248 lb 12.8 oz)   LMP 2019 (Exact Date)   SpO2 98%   BMI 38.97 kg/m²       Physical Exam   Constitutional: She is oriented to person, place, and time. She appears well-developed and well-nourished. She is cooperative.  Non-toxic " appearance. She appears ill (mildly). No distress.   HENT:   Head: Normocephalic and atraumatic.   Right Ear: External ear and ear canal normal. Tympanic membrane is erythematous. Tympanic membrane is not perforated, not retracted and not bulging. A middle ear effusion (cloudy, some air bubbles noted) is present.   Left Ear: Tympanic membrane, external ear and ear canal normal. Tympanic membrane is not perforated, not erythematous, not retracted and not bulging.  No middle ear effusion.   Nose: Mucosal edema present. Right sinus exhibits maxillary sinus tenderness. Right sinus exhibits no frontal sinus tenderness. Left sinus exhibits maxillary sinus tenderness. Left sinus exhibits no frontal sinus tenderness.   Mouth/Throat: Uvula is midline and oropharynx is clear and moist. Tonsils are 0 on the right. Tonsils are 0 on the left.   Eyes: Conjunctivae, EOM and lids are normal. Pupils are equal, round, and reactive to light.   Neck: Trachea normal and normal range of motion. Neck supple.   Cardiovascular: Normal rate, regular rhythm, S1 normal, S2 normal and normal heart sounds.   Pulmonary/Chest: Effort normal and breath sounds normal. She has no wheezes. She has no rhonchi. She has no rales.   Abdominal: Soft. Bowel sounds are normal. There is no tenderness.   Lymphadenopathy:     She has no cervical adenopathy.   Neurological: She is alert and oriented to person, place, and time. Coordination and gait normal.   Skin: Skin is warm, dry and intact.   Psychiatric: She has a normal mood and affect. Her speech is normal and behavior is normal.   Vitals reviewed.      Assessment/Plan     My was seen today for earache.    Diagnoses and all orders for this visit:    Acute suppurative otitis media of right ear without spontaneous rupture of tympanic membrane, recurrence not specified    Acute non-recurrent maxillary sinusitis    Other orders  -     cefdinir (OMNICEF) 300 MG capsule; Take 1 capsule by mouth 2 (Two) Times  a Day for 10 days.  -     fluticasone (FLONASE) 50 MCG/ACT nasal spray; 2 sprays into the nostril(s) as directed by provider Daily.      An antibiotic has been prescribed for you that needs to be taken as directed for the full length of treatment. It is recommended that you take a probiotic when taking an antibiotic. Probiotics are found over the counter in pill form and in some brands of yogurt.      Use Flonase as discussed.     If symptoms are not improving after 3-4 days or if they are worsening at any time please see Dr. Nuñez.

## 2019-01-29 NOTE — PATIENT INSTRUCTIONS
An antibiotic has been prescribed for you that needs to be taken as directed for the full length of treatment. It is recommended that you take a probiotic when taking an antibiotic. Probiotics are found over the counter in pill form and in some brands of yogurt.      Use Flonase as discussed.     If symptoms are not improving after 3-4 days or if they are worsening at any time please see Dr. Nuñez.       Otitis Media, Adult  Otitis media occurs when there is inflammation and fluid in the middle ear. Your middle ear is a part of the ear that contains bones for hearing as well as air that helps send sounds to your brain.  What are the causes?  This condition is caused by a blockage in the eustachian tube. This tube drains fluid from the ear to the back of the nose (nasopharynx). A blockage in this tube can be caused by an object or by swelling (edema) in the tube. Problems that can cause a blockage include:  · A cold or other upper respiratory infection.  · Allergies.  · An irritant, such as tobacco smoke.  · Enlarged adenoids. The adenoids are areas of soft tissue located high in the back of the throat, behind the nose and the roof of the mouth.  · A mass in the nasopharynx.  · Damage to the ear caused by pressure changes (barotrauma).    What are the signs or symptoms?  Symptoms of this condition include:  · Ear pain.  · A fever.  · Decreased hearing.  · A headache.  · Tiredness (lethargy).  · Fluid leaking from the ear.  · Ringing in the ear.    How is this diagnosed?  This condition is diagnosed with a physical exam. During the exam your health care provider will use an instrument called an otoscope to look into your ear and check for redness, swelling, and fluid. He or she will also ask about your symptoms.  Your health care provider may also order tests, such as:  · A test to check the movement of the eardrum (pneumatic otoscopy). This test is done by squeezing a small amount of air into the ear.  · A test that  changes air pressure in the middle ear to check how well the eardrum moves and whether the eustachian tube is working (tympanogram).    How is this treated?  This condition usually goes away on its own within 3-5 days. But if the condition is caused by a bacteria infection and does not go away own its own, or keeps coming back, your health care provider may:  · Prescribe antibiotic medicines to treat the infection.  · Prescribe or recommend medicines to control pain.    Follow these instructions at home:  · Take over-the-counter and prescription medicines only as told by your health care provider.  · If you were prescribed an antibiotic medicine, take it as told by your health care provider. Do not stop taking the antibiotic even if you start to feel better.  · Keep all follow-up visits as told by your health care provider. This is important.  Contact a health care provider if:  · You have bleeding from your nose.  · There is a lump on your neck.  · You are not getting better in 5 days.  · You feel worse instead of better.  Get help right away if:  · You have severe pain that is not controlled with medicine.  · You have swelling, redness, or pain around your ear.  · You have stiffness in your neck.  · A part of your face is paralyzed.  · The bone behind your ear (mastoid) is tender when you touch it.  · You develop a severe headache.  Summary  · Otitis media is redness, soreness, and swelling of the middle ear.  · This condition usually goes away on its own within 3-5 days.  · If the problem does not go away in 3-5 days, your health care provider may prescribe or recommend medicines to treat your symptoms.  · If you were prescribed an antibiotic medicine, take it as told by your health care provider.  This information is not intended to replace advice given to you by your health care provider. Make sure you discuss any questions you have with your health care provider.  Document Released: 09/22/2005 Document  Revised: 12/08/2017 Document Reviewed: 12/08/2017  [employer] Interactive Patient Education © 2018 [employer]      Sinusitis, Adult  Sinusitis is soreness and inflammation of your sinuses. Sinuses are hollow spaces in the bones around your face. Your sinuses are located:  · Around your eyes.  · In the middle of your forehead.  · Behind your nose.  · In your cheekbones.    Your sinuses and nasal passages are lined with a stringy fluid (mucus). Mucus normally drains out of your sinuses. When your nasal tissues become inflamed or swollen, the mucus can become trapped or blocked so air cannot flow through your sinuses. This allows bacteria, viruses, and funguses to grow, which leads to infection.  Sinusitis can develop quickly and last for 7?10 days (acute) or for more than 12 weeks (chronic). Sinusitis often develops after a cold.  What are the causes?  This condition is caused by anything that creates swelling in the sinuses or stops mucus from draining, including:  · Allergies.  · Asthma.  · Bacterial or viral infection.  · Abnormally shaped bones between the nasal passages.  · Nasal growths that contain mucus (nasal polyps).  · Narrow sinus openings.  · Pollutants, such as chemicals or irritants in the air.  · A foreign object stuck in the nose.  · A fungal infection. This is rare.    What increases the risk?  The following factors may make you more likely to develop this condition:  · Having allergies or asthma.  · Having had a recent cold or respiratory tract infection.  · Having structural deformities or blockages in your nose or sinuses.  · Having a weak immune system.  · Doing a lot of swimming or diving.  · Overusing nasal sprays.  · Smoking.    What are the signs or symptoms?  The main symptoms of this condition are pain and a feeling of pressure around the affected sinuses. Other symptoms include:  · Upper toothache.  · Earache.  · Headache.  · Bad breath.  · Decreased sense of smell and taste.  · A cough  that may get worse at night.  · Fatigue.  · Fever.  · Thick drainage from your nose. The drainage is often green and it may contain pus (purulent).  · Stuffy nose or congestion.  · Postnasal drip. This is when extra mucus collects in the throat or back of the nose.  · Swelling and warmth over the affected sinuses.  · Sore throat.  · Sensitivity to light.    How is this diagnosed?  This condition is diagnosed based on symptoms, a medical history, and a physical exam. To find out if your condition is acute or chronic, your health care provider may:  · Look in your nose for signs of nasal polyps.  · Tap over the affected sinus to check for signs of infection.  · View the inside of your sinuses using an imaging device that has a light attached (endoscope).    If your health care provider suspects that you have chronic sinusitis, you may also:  · Be tested for allergies.  · Have a sample of mucus taken from your nose (nasal culture) and checked for bacteria.  · Have a mucus sample examined to see if your sinusitis is related to an allergy.    If your sinusitis does not respond to treatment and it lasts longer than 8 weeks, you may have an MRI or CT scan to check your sinuses. These scans also help to determine how severe your infection is.  In rare cases, a bone biopsy may be done to rule out more serious types of fungal sinus disease.  How is this treated?  Treatment for sinusitis depends on the cause and whether your condition is chronic or acute. If a virus is causing your sinusitis, your symptoms will go away on their own within 10 days. You may be given medicines to relieve your symptoms, including:  · Topical nasal decongestants. They shrink swollen nasal passages and let mucus drain from your sinuses.  · Antihistamines. These drugs block inflammation that is triggered by allergies. This can help to ease swelling in your nose and sinuses.  · Topical nasal corticosteroids. These are nasal sprays that ease  inflammation and swelling in your nose and sinuses.  · Nasal saline washes. These rinses can help to get rid of thick mucus in your nose.    If your condition is caused by bacteria, you will be given an antibiotic medicine. If your condition is caused by a fungus, you will be given an antifungal medicine.  Surgery may be needed to correct underlying conditions, such as narrow nasal passages. Surgery may also be needed to remove polyps.  Follow these instructions at home:  Medicines  · Take, use, or apply over-the-counter and prescription medicines only as told by your health care provider. These may include nasal sprays.  · If you were prescribed an antibiotic medicine, take it as told by your health care provider. Do not stop taking the antibiotic even if you start to feel better.  Hydrate and Humidify  · Drink enough water to keep your urine clear or pale yellow. Staying hydrated will help to thin your mucus.  · Use a cool mist humidifier to keep the humidity level in your home above 50%.  · Inhale steam for 10-15 minutes, 3-4 times a day or as told by your health care provider. You can do this in the bathroom while a hot shower is running.  · Limit your exposure to cool or dry air.  Rest  · Rest as much as possible.  · Sleep with your head raised (elevated).  · Make sure to get enough sleep each night.  General instructions  · Apply a warm, moist washcloth to your face 3-4 times a day or as told by your health care provider. This will help with discomfort.  · Wash your hands often with soap and water to reduce your exposure to viruses and other germs. If soap and water are not available, use hand .  · Do not smoke. Avoid being around people who are smoking (secondhand smoke).  · Keep all follow-up visits as told by your health care provider. This is important.  Contact a health care provider if:  · You have a fever.  · Your symptoms get worse.  · Your symptoms do not improve within 10 days.  Get help  right away if:  · You have a severe headache.  · You have persistent vomiting.  · You have pain or swelling around your face or eyes.  · You have vision problems.  · You develop confusion.  · Your neck is stiff.  · You have trouble breathing.  This information is not intended to replace advice given to you by your health care provider. Make sure you discuss any questions you have with your health care provider.  Document Released: 12/18/2006 Document Revised: 08/13/2017 Document Reviewed: 10/12/2016  [employer] Interactive Patient Education © 2018 [employer]

## 2019-02-04 ENCOUNTER — TELEPHONE (OUTPATIENT)
Dept: FAMILY MEDICINE CLINIC | Facility: CLINIC | Age: 24
End: 2019-02-04

## 2019-02-04 NOTE — TELEPHONE ENCOUNTER
SANDRA-pt called stating that she has had a earache since last Tuesday 01/29/19. She went to [facility] and they put her on omnicef. She stated that she is not any better that it has gone behind her ear and down to where her upper jaw is, everything is muffled along with pain.

## 2019-02-05 ENCOUNTER — OFFICE VISIT (OUTPATIENT)
Dept: FAMILY MEDICINE CLINIC | Facility: CLINIC | Age: 24
End: 2019-02-05

## 2019-02-05 VITALS
OXYGEN SATURATION: 97 % | TEMPERATURE: 97 F | DIASTOLIC BLOOD PRESSURE: 80 MMHG | HEIGHT: 67 IN | SYSTOLIC BLOOD PRESSURE: 126 MMHG | BODY MASS INDEX: 39.55 KG/M2 | WEIGHT: 252 LBS | RESPIRATION RATE: 16 BRPM | HEART RATE: 86 BPM

## 2019-02-05 DIAGNOSIS — H66.001 ACUTE SUPPURATIVE OTITIS MEDIA OF RIGHT EAR WITHOUT SPONTANEOUS RUPTURE OF TYMPANIC MEMBRANE, RECURRENCE NOT SPECIFIED: Primary | ICD-10-CM

## 2019-02-05 PROCEDURE — 96372 THER/PROPH/DIAG INJ SC/IM: CPT | Performed by: FAMILY MEDICINE

## 2019-02-05 PROCEDURE — 99213 OFFICE O/P EST LOW 20 MIN: CPT | Performed by: FAMILY MEDICINE

## 2019-02-05 RX ORDER — DEXAMETHASONE SODIUM PHOSPHATE 4 MG/ML
4 INJECTION, SOLUTION INTRA-ARTICULAR; INTRALESIONAL; INTRAMUSCULAR; INTRAVENOUS; SOFT TISSUE ONCE
Status: COMPLETED | OUTPATIENT
Start: 2019-02-05 | End: 2019-02-05

## 2019-02-05 RX ORDER — CEFTRIAXONE 1 G/1
1 INJECTION, POWDER, FOR SOLUTION INTRAMUSCULAR; INTRAVENOUS ONCE
Status: COMPLETED | OUTPATIENT
Start: 2019-02-05 | End: 2019-02-05

## 2019-02-05 RX ORDER — AMOXICILLIN AND CLAVULANATE POTASSIUM 875; 125 MG/1; MG/1
1 TABLET, FILM COATED ORAL 2 TIMES DAILY
Qty: 20 TABLET | Refills: 0 | Status: SHIPPED | OUTPATIENT
Start: 2019-02-05 | End: 2019-04-03

## 2019-02-05 RX ORDER — METHYLPREDNISOLONE 4 MG/1
TABLET ORAL
Qty: 21 TABLET | Refills: 0 | Status: SHIPPED | OUTPATIENT
Start: 2019-02-05 | End: 2019-04-03

## 2019-02-05 RX ADMIN — DEXAMETHASONE SODIUM PHOSPHATE 4 MG: 4 INJECTION, SOLUTION INTRA-ARTICULAR; INTRALESIONAL; INTRAMUSCULAR; INTRAVENOUS; SOFT TISSUE at 14:22

## 2019-02-05 RX ADMIN — CEFTRIAXONE 1 G: 1 INJECTION, POWDER, FOR SOLUTION INTRAMUSCULAR; INTRAVENOUS at 14:21

## 2019-02-05 NOTE — PROGRESS NOTES
Subjective   My Chavez is a 23 y.o. female.     Chief Complaint   Patient presents with   • Earache       History of Present Illness     as  above with pressure in the right ear      Current Outpatient Medications:   •  acetaminophen (TYLENOL) 325 MG tablet, Take 650 mg by mouth Every 6 (Six) Hours As Needed for Mild Pain  (Takes 2 tablets as needed.)., Disp: , Rfl:   •  ALPRAZolam (XANAX) 0.5 MG tablet, Take 0.5 tablets by mouth 3 (Three) Times a Day As Needed for Anxiety., Disp: 45 tablet, Rfl: 1  •  cefdinir (OMNICEF) 300 MG capsule, Take 1 capsule by mouth 2 (Two) Times a Day for 10 days., Disp: 20 capsule, Rfl: 0  •  citalopram (CeleXA) 20 MG tablet, TAKE 1 TABLET DAILY, Disp: 90 tablet, Rfl: 1  •  ferrous sulfate 325 (65 FE) MG tablet, Take 325 mg by mouth daily with breakfast., Disp: , Rfl:   •  fluticasone (FLONASE) 50 MCG/ACT nasal spray, 2 sprays into the nostril(s) as directed by provider Daily., Disp: 1 bottle, Rfl: 0  •  pantoprazole (PROTONIX) 40 MG EC tablet, TAKE 1 TABLET DAILY, Disp: 90 tablet, Rfl: 1  •  phentermine 15 MG capsule, TAKE ONE CAPSULE EVERY MORNING, Disp: 30 capsule, Rfl: 5  Allergies   Allergen Reactions   • Contrave [Naltrexone-Bupropion Hcl Er] Nausea Only     Nausea         Past Medical History:   Diagnosis Date   • Abdominal pain    • Acid reflux    • Anemia    • Anxiety    • Asthma     POLLEN CAN CAUSE SYMPTOMS NO ASTHMA FOR 8 YEARS   • Depression    • Diarrhea    • Gallbladder abscess    • GERD (gastroesophageal reflux disease)    • Nausea and/or vomiting    • Seasonal allergies      Past Surgical History:   Procedure Laterality Date   • CHOLECYSTECTOMY  04/13/2018   • CHOLECYSTECTOMY WITH INTRAOPERATIVE CHOLANGIOGRAM N/A 4/13/2018    Procedure: LAPAROSCOPIC CHOLECYSTECTOMY  WITH CHOLANGIOGRAM;  Surgeon: Martita Gunter MD;  Location: [facility];  Service: General   • COLONOSCOPY N/A 10/2/2017    Procedure: COLONOSCOPY WITH ANESTHESIA;  Surgeon: Manoj Madden DO;   "Location: [facility] ENDOSCOPY;  Service:    • ENDOSCOPY N/A 10/2/2017    Procedure: ESOPHAGOGASTRODUODENOSCOPY WITH ANESTHESIA;  Surgeon: Manoj Madden DO;  Location: [facility] ENDOSCOPY;  Service:    • TONSILLECTOMY         Review of Systems   Constitutional: Negative.    HENT: Positive for ear pain, rhinorrhea and sinus pain.    Eyes: Negative.    Respiratory: Negative.    Cardiovascular: Negative.    Gastrointestinal: Negative.    Endocrine: Negative.    Genitourinary: Negative.    Musculoskeletal: Negative.    Skin: Negative.    Allergic/Immunologic: Negative.    Neurological: Negative.    Hematological: Negative.    Psychiatric/Behavioral: Negative.        Objective  /80   Pulse 86   Temp 97 °F (36.1 °C)   Resp 16   Ht 170.2 cm (67.01\")   Wt 114 kg (252 lb)   LMP 01/08/2019 (Exact Date)   SpO2 97%   BMI 39.46 kg/m²   Physical Exam   Constitutional: She is oriented to person, place, and time. She appears well-developed and well-nourished.   HENT:   Head: Normocephalic and atraumatic.   Right Ear: External ear normal.   Left Ear: External ear normal.   Mouth/Throat: Oropharynx is clear and moist.   Eyes: Conjunctivae and EOM are normal. Pupils are equal, round, and reactive to light.   Neck: Normal range of motion. Neck supple.   Cardiovascular: Normal rate, regular rhythm, normal heart sounds and intact distal pulses.   Pulmonary/Chest: Effort normal and breath sounds normal.   Abdominal: Soft. Bowel sounds are normal.   Musculoskeletal: Normal range of motion.   Neurological: She is alert and oriented to person, place, and time.   Skin: Skin is warm. Capillary refill takes less than 2 seconds.   Psychiatric: She has a normal mood and affect. Her behavior is normal. Judgment and thought content normal.   Nursing note and vitals reviewed.      Assessment/Plan   My was seen today for earache.    Diagnoses and all orders for this visit:    Acute suppurative otitis media of right ear without " spontaneous rupture of tympanic membrane, recurrence not specified  -     cefTRIAXone (ROCEPHIN) injection 1 g; Inject 1 g into the appropriate muscle as directed by prescriber 1 (One) Time.  -     dexamethasone (DECADRON) injection 4 mg; Inject 1 mL into the appropriate muscle as directed by prescriber 1 (One) Time.    Other orders  -     amoxicillin-clavulanate (AUGMENTIN) 875-125 MG per tablet; Take 1 tablet by mouth 2 (Two) Times a Day.  -     MethylPREDNISolone (MEDROL, MARIELENA,) 4 MG tablet; Take as directed on package instructions.      Let us know if not improving---will refer ent           No orders of the defined types were placed in this encounter.      Follow up: prn

## 2019-03-11 ENCOUNTER — TELEPHONE (OUTPATIENT)
Dept: FAMILY MEDICINE CLINIC | Facility: CLINIC | Age: 24
End: 2019-03-11

## 2019-04-08 RX ORDER — ALPRAZOLAM 0.5 MG/1
TABLET ORAL
Qty: 45 TABLET | Refills: 1 | Status: SHIPPED | OUTPATIENT
Start: 2019-04-08 | End: 2019-12-26

## 2019-04-08 NOTE — TELEPHONE ENCOUNTER
Refill request [facility] xanax    Protocol last refill 6-28-18    [place]

## 2019-06-12 DIAGNOSIS — E66.9 OBESITY, UNSPECIFIED OBESITY SEVERITY, UNSPECIFIED OBESITY TYPE: Chronic | ICD-10-CM

## 2019-06-12 DIAGNOSIS — D64.9 ANEMIA, UNSPECIFIED TYPE: ICD-10-CM

## 2019-06-12 RX ORDER — CITALOPRAM 20 MG/1
TABLET ORAL
Qty: 90 TABLET | Refills: 1 | Status: SHIPPED | OUTPATIENT
Start: 2019-06-12 | End: 2019-11-29 | Stop reason: SDUPTHER

## 2019-06-12 RX ORDER — PANTOPRAZOLE SODIUM 40 MG/1
TABLET, DELAYED RELEASE ORAL
Qty: 90 TABLET | Refills: 1 | Status: SHIPPED | OUTPATIENT
Start: 2019-06-12 | End: 2019-11-29 | Stop reason: SDUPTHER

## 2019-06-18 ENCOUNTER — OFFICE VISIT (OUTPATIENT)
Dept: FAMILY MEDICINE CLINIC | Facility: CLINIC | Age: 24
End: 2019-06-18

## 2019-06-18 VITALS
TEMPERATURE: 98 F | DIASTOLIC BLOOD PRESSURE: 82 MMHG | RESPIRATION RATE: 18 BRPM | HEIGHT: 67 IN | SYSTOLIC BLOOD PRESSURE: 118 MMHG | WEIGHT: 270 LBS | HEART RATE: 110 BPM | OXYGEN SATURATION: 99 % | BODY MASS INDEX: 42.38 KG/M2

## 2019-06-18 DIAGNOSIS — E61.1 IRON DEFICIENCY: ICD-10-CM

## 2019-06-18 DIAGNOSIS — Z71.85 VACCINE COUNSELING: ICD-10-CM

## 2019-06-18 DIAGNOSIS — D64.9 ANEMIA, UNSPECIFIED TYPE: ICD-10-CM

## 2019-06-18 DIAGNOSIS — Z02.0 SCHOOL PHYSICAL EXAM: ICD-10-CM

## 2019-06-18 DIAGNOSIS — Z20.9 EXPOSURE TO COMMUNICABLE DISEASE: Primary | ICD-10-CM

## 2019-06-18 DIAGNOSIS — E66.9 OBESITY, UNSPECIFIED CLASSIFICATION, UNSPECIFIED OBESITY TYPE, UNSPECIFIED WHETHER SERIOUS COMORBIDITY PRESENT: Chronic | ICD-10-CM

## 2019-06-18 DIAGNOSIS — F41.9 ANXIETY: Chronic | ICD-10-CM

## 2019-06-18 DIAGNOSIS — F32.A DEPRESSION, UNSPECIFIED DEPRESSION TYPE: Chronic | ICD-10-CM

## 2019-06-18 LAB
INDURATION: 0 MM (ref 0–10)
Lab: NORMAL
Lab: NORMAL
TB SKIN TEST: NEGATIVE

## 2019-06-18 PROCEDURE — 99395 PREV VISIT EST AGE 18-39: CPT | Performed by: FAMILY MEDICINE

## 2019-06-18 PROCEDURE — 86580 TB INTRADERMAL TEST: CPT | Performed by: FAMILY MEDICINE

## 2019-06-18 NOTE — PATIENT INSTRUCTIONS
Ask your employee health for a copy of your last flu shot documentation  Ask your employee health for hepatitis B vaccine

## 2019-06-19 NOTE — PROGRESS NOTES
Subjective   My Chavez is a 23 y.o. female presenting with chief complaint of:   Chief Complaint   Patient presents with   • Annual Exam     [employer]       History of Present Illness :  Alone.  Here for primarily an acute issue today; she needs forms completed to go to [employer]..   Has few chronic problems to consider that might have a bearing on today's issues; not an interval appointment.       Chronic/acute problems reviewed today:   1. Obesity, unspecified classification, unspecified obesity type, unspecified whether serious comorbidity present: Chronic/stable.  Aware, advised weight loss before.  On/off some success with weight loss but often with weight gain back.      2. Anemia, unspecified type: Chronic/variable: This has been present for years/over a year.  It is chronic/variable  There is no current melena, hematochezia. It has been benign to date and stable/treating/watching.     3. Anxiety: Chronic/variable:  On/off anxiety tolerated well.  Rx helps and not interested in Rx change. Stress ongoing mild.      4. Depression, unspecified depression type: Mild mood swings, down moods, nervousness, difficulty with concentration to function home/work.  Others close have not been concerned.  No suicide ideation/intent.  Rx helps.       5. Iron deficiency: Chronic/variable.  In the past.  Has been replaced in the past.  Denies melena hematochezia.   6. School physical exam acute issues; needed today.   7. Vaccine counseling acute issue involved today.     Has an/another acute issue today: none.    The following portions of the patient's history were reviewed and updated as appropriate: allergies, current medications, past family history, past medical history, past social history, past surgical history and problem list.      Current Outpatient Medications:   •  acetaminophen (TYLENOL) 325 MG tablet, Take 650 mg by mouth Every 6 (Six) Hours As Needed for Mild Pain  (Takes 2 tablets as needed.)., Disp: ,  Rfl:   •  ALPRAZolam (XANAX) 0.5 MG tablet, TAKE 1/2 TABLET THREE TIMESDAILY AS NEEDED FOR ANXIETY, Disp: 45 tablet, Rfl: 1  •  citalopram (CeleXA) 20 MG tablet, TAKE 1 TABLET DAILY, Disp: 90 tablet, Rfl: 1  •  ferrous sulfate 325 (65 FE) MG tablet, Take 325 mg by mouth daily with breakfast., Disp: , Rfl:   •  pantoprazole (PROTONIX) 40 MG EC tablet, TAKE 1 TABLET DAILY, Disp: 90 tablet, Rfl: 1  •  brompheniramine-pseudoephedrine-DM 30-2-10 MG/5ML syrup, Take 5 mL by mouth 4 (Four) Times a Day As Needed for Allergies., Disp: 118 mL, Rfl: 0  •  fluticasone (FLONASE) 50 MCG/ACT nasal spray, 2 sprays into the nostril(s) as directed by provider Daily., Disp: 1 bottle, Rfl: 0    No problems with medications.  Refills if needed done    Allergies   Allergen Reactions   • Contrave [Naltrexone-Bupropion Hcl Er] Nausea Only     Nausea         Review of Systems  GENERAL:  Active home/work; minimal/infrequent exercise. . Sleep is ok ; no apnea. No fever now.  SKIN: No rash/skin lesion of concern:   ENDO:  No syncope, near or diaphoretic sweaty spells.  HEENT: No recent head injury; same occ diffuse headache.   No vision change.  No hearing loss.  Ears without pain/drainage.  No sore throat.  No nasal/sinus congestion/drainage. No epistaxis.  CHEST: No chest wall tenderness or mass.,.  No cough, without wheeze.  No SOB; no hemoptysis.  CV: No chest pain, palpitations, ankle edema.  GI: No dysphagia.  No abdominal pain, diarrhea, constipation.  No rectal bleeding, or melena.  Occ heartburn as above.   :  Voids without dysuria, or  incontinence to completion.  GYN: Menses heavy; has gyne  ORTHO: No painful/swollen joints but various on /off sore.  No  sore neck or back.  No acute neck or back pain without recent injury.  NEURO: No dizziness, weakness of extremities.  No numbness/paresthesias.   PSYCH: No memory loss.  Mood good; variable anxious, depressed but/and not suicidal.  Tries to tolerate stress  ".   Screening:  Mammogram: NA  Bone density: NA  Low dose CT chest: NA  GI:   Colon-neg/Chano//10.2.17  EGD-itis/Chano//10.2.17  Prostate: NA  Usual lab order  NA    Lab Results:  Results for orders placed or performed in visit on 06/18/19   TB Skin Test   Result Value Ref Range    TB Skin Test Presumptive Negative     Induration 0 0 - 10 mm    Injection Date & Time      Read Date & Time         A1C:No results for input(s): HGBA1C in the last 80554 hours.  PSA:No results for input(s): PSA in the last 40864 hours.  CBC:  Lab Results - Last 18 Months   Lab Units 04/06/18  1444   WBC 10*3/mm3 7.70   HEMOGLOBIN g/dL 11.9*   HEMATOCRIT % 38.8   PLATELETS 10*3/mm3 302      BMP/CMP:  Lab Results - Last 18 Months   Lab Units 04/06/18  1444   SODIUM mmol/L 141   POTASSIUM mmol/L 4.4   CHLORIDE mmol/L 103   TOTAL CO2 mmol/L 26.0   GLUCOSE mg/dL 90   BUN mg/dL 10   CREATININE mg/dL 0.63   EGFR IF NONAFRICN AM mL/min/1.73 118   EGFR IF AFRICN AM mL/min/1.73 143   CALCIUM mg/dL 9.6     HEPATIC:  Lab Results - Last 18 Months   Lab Units 04/06/18  1444   ALT (SGPT) U/L 41   AST (SGOT) U/L 24   ALK PHOS U/L 73     THYROID:No results for input(s): TSH, T3FREE, FTI in the last 76550 hours.    Invalid input(s): T4FREE, T3, T4, TEUP,  TOTALT4    Objective   /82 (BP Location: Left arm, Patient Position: Sitting, Cuff Size: Adult)   Pulse 110   Temp 98 °F (36.7 °C)   Resp 18   Ht 170.2 cm (67.01\")   Wt 122 kg (270 lb)   SpO2 99%   Breastfeeding? No   BMI 42.28 kg/m²   Body mass index is 42.28 kg/m².    Physical Exam  GENERAL:  Well nourished/developed in no acute distress.   SKIN: Turgor excellent, without wound, rash, lesion.  HEENT: Normal cephalic without trauma.  Pupils equal round reactive to light. Extraocular motions full without nystagmus.   External canals nonobstructive nontender without reddness. Tymphatic membranes raman with cheikh structures intact.   Oral cavity without growths, exudates, and moist. "  Posterior pharynx without mass, obstruction, redness.  No thyromegaly, mass, tenderness, lymphadenopathy and supple.  CV: Regular rhythm.  No murmur, gallop,  edema. Posterior pulses intact.  No carotid bruits.  CHEST: No chest wall tenderness or mass.   LUNGS: Symmetric motion with clear to auscultation.  No dullness to percussion  ABD: Soft, nontender without mass.   ORTHO: Symmetric extremities without swelling/point tenderness.  Full gross range of motion.  NEURO: CN 2-12 grossly intact.  Symmetric facies and UE/LE. 3/5 strength throughout. Nonfocal use extremities. Speech clear.   PSYCH: Oriented x 3.  Pleasant calm, well kept.  Purposeful/directed conservation with intact short/long gross memory.     Assessment/Plan     1. Obesity, unspecified classification, unspecified obesity type, unspecified whether serious comorbidity present    2. Anemia, unspecified type    3. Anxiety    4. Depression, unspecified depression type    5. Iron deficiency    6. School physical exam    7. Vaccine counseling      She is very adequate for the school performance.  There is no contraindications.  She would benefit from having titers drawn from MMR and varicella to use both for this exam and for future employment.  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    Rx: reviewed/changes:  No orders of the defined types were placed in this encounter.      LAB/Testing/Referrals: reviewed/orders:   Today:   Orders Placed This Encounter   Procedures   • Rubella antibody, IgG   • Rubeola Antibody IgG   • Mumps (IgG / M)   • Varicella zoster antibody, IgG   • TB Skin Test     Chronic/recurrent labs above or change to:      Discussions:   All the above  Body mass index is 42.28 kg/m².  Patient's Body mass index is 42.28 kg/m². BMI is above normal parameters. Recommendations include: exercise counseling, nutrition counseling and as before; I do not recommend medications if she is not going to have an  active diet and exercise program ongoing..    Tobacco use reviewed:  Non-smoker      Patient Instructions   Ask your employee health for a copy of your last flu shot documentation  Ask your employee health for hepatitis B vaccine      Follow up: Return for lab today:  and Dr Nuñez as needed.  No future appointments.

## 2019-06-20 LAB
MEV IGG SER IA-ACNC: >300 AU/ML
MUV IGG SER IA-ACNC: 247 AU/ML
MUV IGM SER IA-ACNC: <0.8 AU (ref 0–0.79)
RUBV IGG SERPL IA-ACNC: 6.84 INDEX
VZV IGG SER IA-ACNC: 1285 INDEX

## 2019-06-20 NOTE — PROGRESS NOTES
Pt here to have PPD test read, negative 0 mm induration. Resulted and given to pt for physical. Pt also advised that she was not given the hepatitis series of shots at work and needs tested for exposure, orders placed and notified pt will need to come back for lab draw as could not be added on.

## 2019-07-31 ENCOUNTER — CLINICAL SUPPORT (OUTPATIENT)
Dept: FAMILY MEDICINE CLINIC | Facility: CLINIC | Age: 24
End: 2019-07-31

## 2019-07-31 DIAGNOSIS — Z02.0 SCHOOL PHYSICAL EXAM: Primary | ICD-10-CM

## 2019-07-31 LAB
INDURATION: 0 MM (ref 0–10)
Lab: NORMAL
Lab: NORMAL
TB SKIN TEST: NEGATIVE

## 2019-07-31 PROCEDURE — 86580 TB INTRADERMAL TEST: CPT | Performed by: FAMILY MEDICINE

## 2019-07-31 NOTE — PROGRESS NOTES
Pt is here for her second of her two step TB skin test, given in [facility] and pt to return Friday and pt stated understanding.

## 2019-11-29 DIAGNOSIS — E66.9 OBESITY, UNSPECIFIED OBESITY SEVERITY, UNSPECIFIED OBESITY TYPE: Chronic | ICD-10-CM

## 2019-11-29 DIAGNOSIS — D64.9 ANEMIA, UNSPECIFIED TYPE: ICD-10-CM

## 2019-12-02 RX ORDER — CITALOPRAM 20 MG/1
TABLET ORAL
Qty: 90 TABLET | Refills: 0 | Status: SHIPPED | OUTPATIENT
Start: 2019-12-02 | End: 2020-02-26

## 2019-12-02 RX ORDER — PANTOPRAZOLE SODIUM 40 MG/1
TABLET, DELAYED RELEASE ORAL
Qty: 90 TABLET | Refills: 0 | Status: SHIPPED | OUTPATIENT
Start: 2019-12-02 | End: 2020-02-27

## 2019-12-26 DIAGNOSIS — F41.9 ANXIETY: Primary | ICD-10-CM

## 2019-12-26 RX ORDER — ALPRAZOLAM 0.5 MG/1
TABLET ORAL
Qty: 45 TABLET | Refills: 1 | Status: SHIPPED | OUTPATIENT
Start: 2019-12-26 | End: 2021-05-25 | Stop reason: SDUPTHER

## 2019-12-26 NOTE — TELEPHONE ENCOUNTER
[facility]    Protocol last refill 4-8-19  [place]

## 2020-02-19 ENCOUNTER — OFFICE VISIT (OUTPATIENT)
Dept: FAMILY MEDICINE CLINIC | Facility: CLINIC | Age: 25
End: 2020-02-19

## 2020-02-19 VITALS
DIASTOLIC BLOOD PRESSURE: 80 MMHG | SYSTOLIC BLOOD PRESSURE: 116 MMHG | TEMPERATURE: 98.3 F | RESPIRATION RATE: 16 BRPM | BODY MASS INDEX: 44.26 KG/M2 | OXYGEN SATURATION: 99 % | HEIGHT: 67 IN | WEIGHT: 282 LBS | HEART RATE: 92 BPM

## 2020-02-19 DIAGNOSIS — F32.A DEPRESSION, UNSPECIFIED DEPRESSION TYPE: Chronic | ICD-10-CM

## 2020-02-19 DIAGNOSIS — F41.9 ANXIETY: Primary | Chronic | ICD-10-CM

## 2020-02-19 DIAGNOSIS — F98.8 ATTENTION DEFICIT DISORDER (ADD) WITHOUT HYPERACTIVITY: ICD-10-CM

## 2020-02-19 DIAGNOSIS — D64.9 ANEMIA, UNSPECIFIED TYPE: ICD-10-CM

## 2020-02-19 PROCEDURE — 99213 OFFICE O/P EST LOW 20 MIN: CPT | Performed by: FAMILY MEDICINE

## 2020-02-19 RX ORDER — METHYLPHENIDATE HYDROCHLORIDE 10 MG/1
10 TABLET ORAL 2 TIMES DAILY
Qty: 60 TABLET | Refills: 0 | Status: SHIPPED | OUTPATIENT
Start: 2020-02-19 | End: 2020-03-25

## 2020-02-19 NOTE — PATIENT INSTRUCTIONS
Start first week Ritalin one a day  Watch/report any palpitations, elevation of bp, insomnia, significant anorexia    ########################

## 2020-02-19 NOTE — PROGRESS NOTES
Subjective   My Chavez is a 24 y.o. female presenting with chief complaint of:   Chief Complaint   Patient presents with   • Unable to focus at school       History of Present Illness :  Alone.       Has multiple chronic problems to consider that might have a bearing on today's issues; somewhat an interval appointment.       Chronic/acute problems reviewed today:   1. Anxiety: Chronic/variable.  Intermittent on and off issues with anxiety but actually using Xanax much less frequently and not having as much anxiety as the past.  Social stressors are not as bad despite working and going to school.   2. Depression, unspecified depression type: chronic/stable without significant  mood swings, down moods, nervousness, difficulty with concentration to function home/work.  Others close have not been concerned.  No suicide ideation/intent.  Rx helps she thinks      3. Anemia, unspecified type: Chronic or past history/stable feeling level of energy: This has been present before.    There has been GI evaulation in the past. There is no current melena, hematochezia. It has been benign to date and stable/treating/watching.  Contributing comorbidities to date: gyne, iron.     4. Attention deficit disorder (ADD) without hyperactivity: She had this in grade school and high school.  She was on medication briefly; it helped.  There was some thought at the time she took medication that it made her have staring spells.  She is going to college for the next year and a half.  She is find it very difficult to concentrate.  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     Has an/another acute issue today: none.    The following portions of the patient's history were reviewed and updated as appropriate: allergies, current medications, past family history, past medical history, past social history, past surgical history and problem list.      Current Outpatient Medications:   •  acetaminophen (TYLENOL) 325 MG tablet, Take 650 mg by mouth  Every 6 (Six) Hours As Needed for Mild Pain  (Takes 2 tablets as needed.)., Disp: , Rfl:   •  ALPRAZolam (XANAX) 0.5 MG tablet, TAKE 1/2 TABLET THREE TIMESDAILY AS NEEDED FOR ANXIETY, Disp: 45 tablet, Rfl: 1  •  citalopram (CeleXA) 20 MG tablet, TAKE 1 TABLET DAILY, Disp: 90 tablet, Rfl: 0  •  ferrous sulfate 325 (65 FE) MG tablet, Take 325 mg by mouth daily with breakfast., Disp: , Rfl:   •  fluticasone (FLONASE) 50 MCG/ACT nasal spray, 2 sprays into the nostril(s) as directed by provider Daily., Disp: 1 bottle, Rfl: 0  •  pantoprazole (PROTONIX) 40 MG EC tablet, TAKE 1 TABLET DAILY, Disp: 90 tablet, Rfl: 0    No problems with medications.  Refills if needed done    Allergies   Allergen Reactions   • Contrave [Naltrexone-Bupropion Hcl Er] Nausea Only     Nausea         Review of Systems  GENERAL:  Active home/work; minimal/infrequent exercise. . Sleep is ok ; no apnea. No fevers.  SKIN: No rash/skin lesion of concern:   ENDO:  No syncope, near or diaphoretic sweaty spells.  HEENT: No recent head injury; same occ diffuse headache.   No vision change.  No hearing loss.  Ears without pain/drainage.  No sore throat.  No nasal/sinus congestion/drainage. No epistaxis.  CHEST: No chest wall tenderness or mass.,.  No cough, without wheeze.  No SOB; no hemoptysis.  CV: No chest pain, palpitations, ankle edema.  GI: No dysphagia.  No abdominal pain, diarrhea, constipation.  No rectal bleeding, or melena.  Occ heartburn.    :  Voids without dysuria, or  incontinence to completion.  GYN: Menses less heavy; has gyne  ORTHO: No painful/swollen joints but various on /off sore.  No sore neck or back.  No acute neck or back pain without recent injury.  NEURO: No dizziness, weakness of extremities.  No numbness/paresthesias.   PSYCH: No memory loss.  Mood good; variable anxious, depressed but/and not suicidal.  Tries to tolerate stress .   Screening:  Mammogram: NA  Bone density: NA  Low dose CT chest: Tobacco-smoker/none:  "NA  GI:   Colon-neg/Chano//10.2.17  EGD-itis/Chano//10.2.17  Prostate: NA  Usual lab order  3m CBC, iron  12m CBC, CMP, LIPID, TSH, fT4, iron    Lab Results:  Results for orders placed or performed in visit on 07/31/19   TB Skin Test   Result Value Ref Range    TB Skin Test Negative     Induration 0 0 - 10 mm    Injection Date & Time 7-31-19 @10:45     Read Date & Time 8-2-19 @1:30p        A1C:No results for input(s): HGBA1C in the last 34471 hours.  PSA:No results for input(s): PSA in the last 35137 hours.  CBC:No results for input(s): WBC, HGB, HCT, PLT, IRONSERUM, IRON in the last 68570 hours.   BMP/CMP:No results for input(s): NA, K, CL, CO2, GLU, BUN, CREATININE, EGFRIFNONA, EGFRIFAFRI, CALCIUM in the last 46369 hours.  HEPATIC:No results for input(s): ALT, AST, ALKPHOS, TOTAL in the last 91072 hours.  THYROID:No results for input(s): TSH, T3FREE, FTI in the last 73864 hours.    Invalid input(s): T4FREE, T3, T4, TEUP,  TOTALT4    Objective   /80   Pulse 92   Temp 98.3 °F (36.8 °C)   Resp 16   Ht 170.2 cm (67\")   Wt 128 kg (282 lb)   SpO2 99%   Breastfeeding No   BMI 44.17 kg/m²   Body mass index is 44.17 kg/m².    Physical Exam  GENERAL:  Well nourished/developed in no acute distress.   SKIN: Turgor excellent, without wound, rash, lesion.  HEENT: Normal cephalic without trauma.  Pupils equal round reactive to light. Extraocular motions full without nystagmus.   External canals nonobstructive nontender without reddness. Tymphatic membranes raman with cheikh structures intact.   Oral cavity without growths, exudates, and moist.  Posterior pharynx without mass, obstruction, redness.  No thyromegaly, mass, tenderness, lymphadenopathy and supple.  CV: Regular rhythm.  No murmur, gallop,  edema. Posterior pulses intact.  No carotid bruits.  CHEST: No chest wall tenderness or mass.   LUNGS: Symmetric motion with clear to auscultation.  No dullness to percussion  ABD: Soft, nontender without mass. "   ORTHO: Symmetric extremities without swelling/point tenderness.  Full gross range of motion.  NEURO: CN 2-12 grossly intact.  Symmetric facies and UE/LE. 3/5 strength throughout. Nonfocal use extremities. Speech clear.   PSYCH: Oriented x 3.  Pleasant calm, well kept.  Purposeful/directed conservation with intact short/long gross memory.     Assessment/Plan     1. Anxiety    2. Depression, unspecified depression type    3. Anemia, unspecified type    4. Attention deficit disorder (ADD) without hyperactivity        Rx: reviewed/changes:  New Medications Ordered This Visit   Medications   • methylphenidate (RITALIN) 10 MG tablet     Sig: Take 1 tablet by mouth 2 (Two) Times a Day.     Dispense:  60 tablet     Refill:  0     LAB/Testing/Referrals: reviewed/orders:   Today:   No orders of the defined types were placed in this encounter.    Chronic/recurrent labs above or change to:   Clarified; time to repeat.  She works [employer] and she will do that and let us know    Discussions:   Trial of Rx at first; seen this trial to see if it seems to make any difference and is well-tolerated.  It may need adjustment  Body mass index is 44.17 kg/m².  Patient's Body mass index is 44.17 kg/m². BMI is above normal parameters. Recommendations include: exercise counseling, nutrition counseling and as past.    Tobacco use reviewed:    Non-smoker  My Chavez  reports that she quit smoking about 3 years ago. Her smoking use included cigarettes. She started smoking about 5 years ago. She smoked 0.10 packs per day. She has never used smokeless tobacco..   Patient Instructions   Start first week Ritalin one a day  Watch/report any palpitations, elevation of bp, insomnia, significant anorexia    ########################        Follow up: Return for Dr Nuñez-1m.  Future Appointments   Date Time Provider Department Center   3/18/2020  9:30 AM Mak Nuñez MD [facility] None

## 2020-02-26 RX ORDER — CITALOPRAM 20 MG/1
TABLET ORAL
Qty: 90 TABLET | Refills: 1 | Status: SHIPPED | OUTPATIENT
Start: 2020-02-26 | End: 2020-08-25

## 2020-02-27 DIAGNOSIS — D64.9 ANEMIA, UNSPECIFIED TYPE: ICD-10-CM

## 2020-02-27 DIAGNOSIS — E66.9 OBESITY, UNSPECIFIED OBESITY SEVERITY, UNSPECIFIED OBESITY TYPE: Chronic | ICD-10-CM

## 2020-02-27 RX ORDER — PANTOPRAZOLE SODIUM 40 MG/1
TABLET, DELAYED RELEASE ORAL
Qty: 90 TABLET | Refills: 0 | Status: SHIPPED | OUTPATIENT
Start: 2020-02-27 | End: 2020-05-28

## 2020-03-18 ENCOUNTER — TELEPHONE (OUTPATIENT)
Dept: FAMILY MEDICINE CLINIC | Facility: CLINIC | Age: 25
End: 2020-03-18

## 2020-03-18 DIAGNOSIS — J20.9 ACUTE BRONCHITIS, UNSPECIFIED ORGANISM: ICD-10-CM

## 2020-03-18 DIAGNOSIS — R50.9 FEVER, UNSPECIFIED FEVER CAUSE: Primary | ICD-10-CM

## 2020-03-18 DIAGNOSIS — R68.89 FLU-LIKE SYMPTOMS: ICD-10-CM

## 2020-03-18 DIAGNOSIS — R50.9 FEVER, UNSPECIFIED FEVER CAUSE: ICD-10-CM

## 2020-03-18 RX ORDER — METHYLPREDNISOLONE 4 MG/1
TABLET ORAL
Qty: 21 TABLET | Refills: 0 | Status: SHIPPED | OUTPATIENT
Start: 2020-03-18 | End: 2020-05-12

## 2020-03-18 RX ORDER — CLARITHROMYCIN 500 MG/1
500 TABLET, COATED ORAL 2 TIMES DAILY
Qty: 14 TABLET | Refills: 0 | Status: SHIPPED | OUTPATIENT
Start: 2020-03-18 | End: 2020-05-12

## 2020-03-18 NOTE — TELEPHONE ENCOUNTER
Spoke to patient and will need an order fax to [facility] and done, awaiting results

## 2020-03-18 NOTE — TELEPHONE ENCOUNTER
Malinda from [facility] lab just called to advise that pt is negative for flu and strep and pt is waiting to see if Dr Nuñez wants blood or further labs drawn?, message to Dr Nuñez

## 2020-03-18 NOTE — TELEPHONE ENCOUNTER
"Pt was to have appt this am at 9:30 for a 1 month follow up, screening Covid Clinical Evaluation Algorithm advised pt to \"1-self-quarantine, 2-consider flu treatment (pt has not had the flu this season, did get flu shot),3- work note, 4-PCP phone F/U    Spoke to patient \"I have a fever of 101.3 it started around 5 am, Tylenol is not bringing it down, im getting ready to take some Motrin. but I was having runny nose, sinus drainage since Monday, it feels like a really bad sinus infection. I have been working 3-11 all last week and supposed to work 3-11 the rest of the week\"      "

## 2020-03-18 NOTE — TELEPHONE ENCOUNTER
I called patient back and try to explain my interpretation the current a log with him; log rhythms is certainly could change as we speak  Since she has no known exposure she needs to first be tested for strep and influenza.  If she can work that out at [employer] where she works that would be great.  If she has to walk through the building she needs to be [place] while she does this.  She needs to call her HR at [employer]; this is not a time to go to work sick and I know they are not going to want her going to work.  In fact because she is a  in the hospital they may work out a way to get her corona tested.

## 2020-03-19 ENCOUNTER — TELEPHONE (OUTPATIENT)
Dept: FAMILY MEDICINE CLINIC | Facility: CLINIC | Age: 25
End: 2020-03-19

## 2020-03-19 NOTE — TELEPHONE ENCOUNTER
"2.18.20  Pt was to have appt this am at 9:30 for a 1 month follow up, screening Covid Clinical Evaluation Algorithm advised pt to \"1-self-quarantine, 2-consider flu treatment (pt has not had the flu this season, did get flu shot),3- work note, 4-PCP phone F/U     Spoke to patient \"I have a fever of 101.3 it started around 5 am, Tylenol is not bringing it down, im getting ready to take some Motrin. but I was having runny nose, sinus drainage since Monday, it feels like a really bad sinus infection. I have been working 3-11 all last week and supposed to work 3-11 the rest of the week\"    I (ro) called patient back and try to explain my interpretation the current a log with him; log rhythms is certainly could change as we speak  Since she has no known exposure she needs to first be tested for strep and influenza.  If she can work that out at [employer] where she works that would be great.  If she has to walk through the building she needs to be [employer] while she does this.  She needs to call her HR at [employer]; this is not a time to go to work sick and I know they are not going to want her going to work.  In fact because she is a  in the hospital they may work out a way to get her corona tested.   Agreed to Biaxin and medrol dose pack.     Strept neg  Flu neg    3.19.20  PATIENT CALLED TO ADVISE THAT HER TEMP IS 98.2 AND SHE IS FEELING A LITTLE BIT BETTER.   ro talked to patient; she still is not aware of any exposure.  HR told her to not return till 3.23.20.   No cough; still sinus drainage/sore throat.  I agree 48 hrs no fever (in this case overkill with Monday)    "

## 2020-03-25 DIAGNOSIS — F98.8 ATTENTION DEFICIT DISORDER (ADD) WITHOUT HYPERACTIVITY: ICD-10-CM

## 2020-03-25 RX ORDER — METHYLPHENIDATE HYDROCHLORIDE 10 MG/1
TABLET ORAL
Qty: 60 TABLET | Refills: 0 | Status: SHIPPED | OUTPATIENT
Start: 2020-03-25 | End: 2020-05-04

## 2020-05-04 DIAGNOSIS — F98.8 ATTENTION DEFICIT DISORDER (ADD) WITHOUT HYPERACTIVITY: ICD-10-CM

## 2020-05-04 RX ORDER — METHYLPHENIDATE HYDROCHLORIDE 10 MG/1
TABLET ORAL
Qty: 60 TABLET | Refills: 0 | Status: SHIPPED | OUTPATIENT
Start: 2020-05-04 | End: 2020-06-08

## 2020-05-12 ENCOUNTER — OFFICE VISIT (OUTPATIENT)
Dept: OBSTETRICS AND GYNECOLOGY | Facility: CLINIC | Age: 25
End: 2020-05-12

## 2020-05-12 VITALS
WEIGHT: 271 LBS | DIASTOLIC BLOOD PRESSURE: 86 MMHG | HEIGHT: 67 IN | SYSTOLIC BLOOD PRESSURE: 126 MMHG | BODY MASS INDEX: 42.53 KG/M2

## 2020-05-12 DIAGNOSIS — Z87.891 FORMER SMOKER: ICD-10-CM

## 2020-05-12 DIAGNOSIS — N93.8 DUB (DYSFUNCTIONAL UTERINE BLEEDING): ICD-10-CM

## 2020-05-12 DIAGNOSIS — E28.2 PCOS (POLYCYSTIC OVARIAN SYNDROME): Primary | ICD-10-CM

## 2020-05-12 DIAGNOSIS — Z20.2 POSSIBLE EXPOSURE TO STD: ICD-10-CM

## 2020-05-12 PROCEDURE — 99203 OFFICE O/P NEW LOW 30 MIN: CPT | Performed by: OBSTETRICS & GYNECOLOGY

## 2020-05-12 RX ORDER — NORETHINDRONE ACETATE AND ETHINYL ESTRADIOL 1; .02 MG/1; MG/1
1 TABLET ORAL DAILY
Qty: 21 TABLET | Refills: 12 | Status: SHIPPED | OUTPATIENT
Start: 2020-05-12 | End: 2020-12-08

## 2020-05-12 NOTE — PROGRESS NOTES
"Subjective   Chief Complaint   Patient presents with   • Pelvic Pain     pt here today as new pt with c/o heavy bleeding during periods. pt voices that her periods are lasting about 10-15 days. pt also c/o clotting with periods and pain during menses. pt had ultrasound today. pt voices no other concerns.      My Chavez is a 24 y.o. year old No obstetric history on file..  Patient's last menstrual period was 05/01/2020 (exact date).  She presents to be seen because of DUB.  Patient reports heavy bleeding with periods, and \"severe\" cramping.  Bleeding lasts 10-15 days at a time, then there will be a break for 28-30 days and then she will bleed for another two weeks.  The really heavy bleeding is about 8 days, the other days are \"manageable\".  The patient was on Lo Loestrin when she was in HS, but she did not take the pills regularly and so she had a lot of self-induced breakthrough bleeding.  She admits that she was a poor pill-taker, and could do better now.     The following portions of the patient's history were reviewed and updated as appropriate:current medications and allergies    Social History    Tobacco Use      Smoking status: Former Smoker        Packs/day: 0.10        Types: Cigarettes        Start date: 9/27/2014        Quit date: 10/27/2016        Years since quitting: 3.5      Smokeless tobacco: Never Used    Review of Systems   Constitutional: Positive for activity change.   Respiratory: Negative for shortness of breath.    Cardiovascular: Negative for chest pain.   Gastrointestinal: Negative for abdominal pain.   Genitourinary: Positive for menstrual problem (heavy, excessively long, irregular, with severe cramps and clots). Negative for dyspareunia and pelvic pain.   Neurological: Negative for headaches (no h/o migraines).         Objective   /86   Ht 170.2 cm (67\")   Wt 123 kg (271 lb)   LMP 05/01/2020 (Exact Date)   BMI 42.44 kg/m²     Physical Exam   Constitutional: She is oriented to " person, place, and time. She appears well-developed and well-nourished.   HENT:   Head: Normocephalic and atraumatic.   Neck: Normal range of motion. No thyromegaly present.   Pulmonary/Chest: Effort normal.   Abdominal: Soft. She exhibits no distension. There is no tenderness.   Genitourinary:   Genitourinary Comments: Uterus 5 X 3.7 X 3, endo lining .12 cm  Ovaries with PCOS appearance   Musculoskeletal: Normal range of motion.   Neurological: She is alert and oriented to person, place, and time.   Psychiatric: She has a normal mood and affect. Her behavior is normal. Judgment and thought content normal.   Nursing note and vitals reviewed.      Lab Review   No data reviewed    Imaging   Pelvic ultrasound report     Assessment & Plan  My was seen today for pelvic pain.    Diagnoses and all orders for this visit:    PCOS (polycystic ovarian syndrome): Ovaries with multiple small follicles on u/s, although bleeding appears to occur at somewhat regular intervals.  BMI does increase risk of PCOS.  Patient may be trending toward this diagnosis, but not actually have clinical PCOS yet.  Starting OCP's for her DUB may also help her not progress toward more symptoms of PCOS    DUB (dysfunctional uterine bleeding): Patient wants to re-try OCP's since she feels she will be better at taking them now, compared to when she was younger  -     norethindrone-ethinyl estradiol (Loestrin 1/20, 21,) 1-20 MG-MCG per tablet; Take 1 tablet by mouth Daily.    Former smoker    Possible exposure to STD: Patient has had new partner in recent months, and is agreeable to STC screening from urine  -     Chlamydia trachomatis, Neisseria gonorrhoeae, PCR - Urine, Urine, Clean Catch    Patient to RTO in 3 months for f/u appointment    This note was electronically signed.    Deborah Hays MD  May 12, 2020  15:15    Total time spent today with My  was 35 minutes (level 3).  Greater than 50% of the time was spent coordinating care, answering  her questions and counseling regarding pathophysiology of her presenting problem along with plans for any diagnositc work-up and treatment.

## 2020-05-14 LAB
C TRACH RRNA SPEC QL NAA+PROBE: NEGATIVE
N GONORRHOEA RRNA SPEC QL NAA+PROBE: NEGATIVE

## 2020-05-18 ENCOUNTER — TELEPHONE (OUTPATIENT)
Dept: OBSTETRICS AND GYNECOLOGY | Facility: CLINIC | Age: 25
End: 2020-05-18

## 2020-05-18 NOTE — TELEPHONE ENCOUNTER
My calls for clarification.  States she received report of US on 5/12 showing ovaries appear normal without cysts/masses  OV 5/12 =  PCOS  Please advise.  Thank you

## 2020-05-19 NOTE — TELEPHONE ENCOUNTER
"I have personally reviewed the images from the patient's u/s and both of her ovaries have multiple small follicles on them.  The statement \"no cysts/masses\" would refer to fact that there is not something larger in size - probably something that would be an indication for additional testing or a follow-up u/s.  Let me know if this is still unclear to patient after you talk to her."

## 2020-05-27 DIAGNOSIS — D64.9 ANEMIA, UNSPECIFIED TYPE: ICD-10-CM

## 2020-05-27 DIAGNOSIS — E66.9 OBESITY, UNSPECIFIED OBESITY SEVERITY, UNSPECIFIED OBESITY TYPE: Chronic | ICD-10-CM

## 2020-05-28 ENCOUNTER — APPOINTMENT (OUTPATIENT)
Dept: ULTRASOUND IMAGING | Facility: HOSPITAL | Age: 25
End: 2020-05-28

## 2020-05-28 ENCOUNTER — APPOINTMENT (OUTPATIENT)
Dept: CT IMAGING | Facility: HOSPITAL | Age: 25
End: 2020-05-28

## 2020-05-28 ENCOUNTER — HOSPITAL ENCOUNTER (EMERGENCY)
Facility: HOSPITAL | Age: 25
Discharge: HOME | End: 2020-05-28
Admitting: FAMILY MEDICINE

## 2020-05-28 VITALS
DIASTOLIC BLOOD PRESSURE: 78 MMHG | TEMPERATURE: 98.4 F | OXYGEN SATURATION: 99 % | SYSTOLIC BLOOD PRESSURE: 135 MMHG | WEIGHT: 272 LBS | HEIGHT: 67 IN | BODY MASS INDEX: 42.69 KG/M2 | HEART RATE: 84 BPM | RESPIRATION RATE: 16 BRPM

## 2020-05-28 DIAGNOSIS — R10.31 RIGHT LOWER QUADRANT ABDOMINAL PAIN: Primary | ICD-10-CM

## 2020-05-28 DIAGNOSIS — N83.202 CYST OF LEFT OVARY: ICD-10-CM

## 2020-05-28 LAB
ALBUMIN SERPL-MCNC: 4.4 G/DL (ref 3.5–5.2)
ALBUMIN/GLOB SERPL: 1.5 G/DL
ALP SERPL-CCNC: 69 U/L (ref 39–117)
ALT SERPL W P-5'-P-CCNC: 36 U/L (ref 1–33)
AMYLASE SERPL-CCNC: 53 U/L (ref 28–100)
ANION GAP SERPL CALCULATED.3IONS-SCNC: 16 MMOL/L (ref 5–15)
AST SERPL-CCNC: 20 U/L (ref 1–32)
B-HCG UR QL: NEGATIVE
BASOPHILS # BLD AUTO: 0.06 10*3/MM3 (ref 0–0.2)
BASOPHILS NFR BLD AUTO: 0.6 % (ref 0–1.5)
BILIRUB SERPL-MCNC: 0.4 MG/DL (ref 0.2–1.2)
BILIRUB UR QL STRIP: NEGATIVE
BUN BLD-MCNC: 10 MG/DL (ref 6–20)
BUN/CREAT SERPL: 15.9 (ref 7–25)
CALCIUM SPEC-SCNC: 9.3 MG/DL (ref 8.6–10.5)
CHLORIDE SERPL-SCNC: 103 MMOL/L (ref 98–107)
CLARITY UR: CLEAR
CO2 SERPL-SCNC: 20 MMOL/L (ref 22–29)
COLOR UR: ABNORMAL
CREAT BLD-MCNC: 0.63 MG/DL (ref 0.57–1)
DEPRECATED RDW RBC AUTO: 38.7 FL (ref 37–54)
EOSINOPHIL # BLD AUTO: 0.23 10*3/MM3 (ref 0–0.4)
EOSINOPHIL NFR BLD AUTO: 2.3 % (ref 0.3–6.2)
ERYTHROCYTE [DISTWIDTH] IN BLOOD BY AUTOMATED COUNT: 14.1 % (ref 12.3–15.4)
GFR SERPL CREATININE-BSD FRML MDRD: 116 ML/MIN/1.73
GLOBULIN UR ELPH-MCNC: 2.9 GM/DL
GLUCOSE BLD-MCNC: 96 MG/DL (ref 65–99)
GLUCOSE UR STRIP-MCNC: NEGATIVE MG/DL
HCG SERPL QL: NEGATIVE
HCT VFR BLD AUTO: 36.9 % (ref 34–46.6)
HGB BLD-MCNC: 12.1 G/DL (ref 12–15.9)
HGB UR QL STRIP.AUTO: NEGATIVE
HOLD SPECIMEN: NORMAL
HOLD SPECIMEN: NORMAL
IMM GRANULOCYTES # BLD AUTO: 0.03 10*3/MM3 (ref 0–0.05)
IMM GRANULOCYTES NFR BLD AUTO: 0.3 % (ref 0–0.5)
INTERNAL NEGATIVE CONTROL: NEGATIVE
INTERNAL POSITIVE CONTROL: POSITIVE
KETONES UR QL STRIP: ABNORMAL
LEUKOCYTE ESTERASE UR QL STRIP.AUTO: NEGATIVE
LIPASE SERPL-CCNC: 23 U/L (ref 13–60)
LYMPHOCYTES # BLD AUTO: 1.74 10*3/MM3 (ref 0.7–3.1)
LYMPHOCYTES NFR BLD AUTO: 17.1 % (ref 19.6–45.3)
Lab: NORMAL
MCH RBC QN AUTO: 25.1 PG (ref 26.6–33)
MCHC RBC AUTO-ENTMCNC: 32.8 G/DL (ref 31.5–35.7)
MCV RBC AUTO: 76.6 FL (ref 79–97)
MONOCYTES # BLD AUTO: 0.5 10*3/MM3 (ref 0.1–0.9)
MONOCYTES NFR BLD AUTO: 4.9 % (ref 5–12)
NEUTROPHILS # BLD AUTO: 7.63 10*3/MM3 (ref 1.7–7)
NEUTROPHILS NFR BLD AUTO: 74.8 % (ref 42.7–76)
NITRITE UR QL STRIP: NEGATIVE
NRBC BLD AUTO-RTO: 0 /100 WBC (ref 0–0.2)
PH UR STRIP.AUTO: <=5 [PH] (ref 5–8)
PLATELET # BLD AUTO: 276 10*3/MM3 (ref 140–450)
PMV BLD AUTO: 10.1 FL (ref 6–12)
POTASSIUM BLD-SCNC: 4 MMOL/L (ref 3.5–5.2)
PROT SERPL-MCNC: 7.3 G/DL (ref 6–8.5)
PROT UR QL STRIP: NEGATIVE
RBC # BLD AUTO: 4.82 10*6/MM3 (ref 3.77–5.28)
SODIUM BLD-SCNC: 139 MMOL/L (ref 136–145)
SP GR UR STRIP: 1.03 (ref 1–1.03)
UROBILINOGEN UR QL STRIP: ABNORMAL
WBC NRBC COR # BLD: 10.19 10*3/MM3 (ref 3.4–10.8)
WHOLE BLOOD HOLD SPECIMEN: NORMAL
WHOLE BLOOD HOLD SPECIMEN: NORMAL

## 2020-05-28 PROCEDURE — 74177 CT ABD & PELVIS W/CONTRAST: CPT

## 2020-05-28 PROCEDURE — 80053 COMPREHEN METABOLIC PANEL: CPT

## 2020-05-28 PROCEDURE — 25010000002 ONDANSETRON PER 1 MG: Performed by: NURSE PRACTITIONER

## 2020-05-28 PROCEDURE — 82150 ASSAY OF AMYLASE: CPT | Performed by: NURSE PRACTITIONER

## 2020-05-28 PROCEDURE — 96376 TX/PRO/DX INJ SAME DRUG ADON: CPT

## 2020-05-28 PROCEDURE — 83690 ASSAY OF LIPASE: CPT

## 2020-05-28 PROCEDURE — 81003 URINALYSIS AUTO W/O SCOPE: CPT | Performed by: NURSE PRACTITIONER

## 2020-05-28 PROCEDURE — 96374 THER/PROPH/DIAG INJ IV PUSH: CPT

## 2020-05-28 PROCEDURE — 25010000002 IOPAMIDOL 61 % SOLUTION: Performed by: NURSE PRACTITIONER

## 2020-05-28 PROCEDURE — 25010000003 HYDROMORPHONE 1 MG/ML SOLUTION: Performed by: NURSE PRACTITIONER

## 2020-05-28 PROCEDURE — 96375 TX/PRO/DX INJ NEW DRUG ADDON: CPT

## 2020-05-28 PROCEDURE — 76856 US EXAM PELVIC COMPLETE: CPT

## 2020-05-28 PROCEDURE — 81025 URINE PREGNANCY TEST: CPT

## 2020-05-28 PROCEDURE — 99284 EMERGENCY DEPT VISIT MOD MDM: CPT

## 2020-05-28 PROCEDURE — 85025 COMPLETE CBC W/AUTO DIFF WBC: CPT

## 2020-05-28 PROCEDURE — 84703 CHORIONIC GONADOTROPIN ASSAY: CPT | Performed by: NURSE PRACTITIONER

## 2020-05-28 RX ORDER — KETOROLAC TROMETHAMINE 10 MG/1
10 TABLET, FILM COATED ORAL EVERY 6 HOURS PRN
Qty: 15 TABLET | Refills: 0 | Status: SHIPPED | OUTPATIENT
Start: 2020-05-28 | End: 2020-10-06

## 2020-05-28 RX ORDER — SODIUM CHLORIDE 0.9 % (FLUSH) 0.9 %
10 SYRINGE (ML) INJECTION AS NEEDED
Status: DISCONTINUED | OUTPATIENT
Start: 2020-05-28 | End: 2020-05-29 | Stop reason: HOSPADM

## 2020-05-28 RX ORDER — PANTOPRAZOLE SODIUM 40 MG/1
TABLET, DELAYED RELEASE ORAL
Qty: 90 TABLET | Refills: 3 | Status: SHIPPED | OUTPATIENT
Start: 2020-05-28 | End: 2021-05-25

## 2020-05-28 RX ORDER — ONDANSETRON 2 MG/ML
4 INJECTION INTRAMUSCULAR; INTRAVENOUS ONCE
Status: COMPLETED | OUTPATIENT
Start: 2020-05-28 | End: 2020-05-28

## 2020-05-28 RX ORDER — ONDANSETRON 4 MG/1
4 TABLET, ORALLY DISINTEGRATING ORAL EVERY 6 HOURS PRN
Qty: 10 TABLET | Refills: 0 | Status: SHIPPED | OUTPATIENT
Start: 2020-05-28 | End: 2020-10-06

## 2020-05-28 RX ADMIN — HYDROMORPHONE HYDROCHLORIDE 1 MG: 1 INJECTION, SOLUTION INTRAMUSCULAR; INTRAVENOUS; SUBCUTANEOUS at 19:38

## 2020-05-28 RX ADMIN — IOPAMIDOL 100 ML: 612 INJECTION, SOLUTION INTRAVENOUS at 20:18

## 2020-05-28 RX ADMIN — ONDANSETRON HYDROCHLORIDE 4 MG: 2 SOLUTION INTRAMUSCULAR; INTRAVENOUS at 19:38

## 2020-05-28 RX ADMIN — HYDROMORPHONE HYDROCHLORIDE 1 MG: 1 INJECTION, SOLUTION INTRAMUSCULAR; INTRAVENOUS; SUBCUTANEOUS at 21:47

## 2020-05-28 RX ADMIN — SODIUM CHLORIDE 1000 ML: 9 INJECTION, SOLUTION INTRAVENOUS at 19:38

## 2020-06-04 ENCOUNTER — OFFICE VISIT (OUTPATIENT)
Dept: OBSTETRICS AND GYNECOLOGY | Facility: CLINIC | Age: 25
End: 2020-06-04

## 2020-06-04 VITALS
BODY MASS INDEX: 42.06 KG/M2 | HEIGHT: 67 IN | DIASTOLIC BLOOD PRESSURE: 82 MMHG | WEIGHT: 268 LBS | SYSTOLIC BLOOD PRESSURE: 122 MMHG

## 2020-06-04 DIAGNOSIS — E28.2 PCOS (POLYCYSTIC OVARIAN SYNDROME): Primary | ICD-10-CM

## 2020-06-04 DIAGNOSIS — N83.201 RIGHT OVARIAN CYST: ICD-10-CM

## 2020-06-04 DIAGNOSIS — Z87.891 FORMER SMOKER: ICD-10-CM

## 2020-06-04 PROCEDURE — 99213 OFFICE O/P EST LOW 20 MIN: CPT | Performed by: OBSTETRICS & GYNECOLOGY

## 2020-06-04 NOTE — PROGRESS NOTES
Subjective   Chief Complaint   Patient presents with   • Ovarian Cyst     pt here today following up on ovarian cyst from ER visit. pt had ultrasound and CT on 5-28-20. pt reports no pain currently. pt voices no other concerns.       My Chavez is a 24 y.o. year old No obstetric history on file..  Patient's last menstrual period was 05/01/2020 (exact date).  She presents to be seen for ER follow-up.  Patient recent in ER with acute RLQ pain and was diagnosed with rupturing ovarian cyst.  Patient reports pain has completely resolved and she has no discomfort at this time.    FINDINGS:  Transabdominal sonographic evaluation of the pelvis was performed in the  transverse and longitudinal projections.     The uterus measures 7.0 x 2.8 x 6.4 cm in size. The endometrium measures  5 mm in double AP thickness.     The right ovary measures 3.3 x 2.0 x 2.2 cm in size with normal color  flow.     The left ovary measures 5.0 x 2.6 x 4.7 cm in size and contains a 3.3 cm  cyst, presumably functional. Normal color flow is demonstrated.     IMPRESSION:  3.3 cm left ovarian cyst, presumably functional.     A preliminary report was provided by stat rad.  This report was finalized on 05/29/2020 07:29 by Dr. Derrick Christopher MD.    The following portions of the patient's history were reviewed and updated as appropriate:current medications and allergies    Social History    Tobacco Use      Smoking status: Former Smoker        Packs/day: 0.10        Types: Cigarettes        Start date: 9/27/2014        Quit date: 10/27/2016        Years since quitting: 3.6      Smokeless tobacco: Never Used    Review of Systems   Constitutional: Negative for activity change and unexpected weight change.   Respiratory: Negative for shortness of breath.    Cardiovascular: Negative for chest pain.   Gastrointestinal: Negative for abdominal pain, constipation, diarrhea, nausea and vomiting.   Genitourinary: Negative for pelvic pain.         Objective   BP  "122/82   Ht 170.2 cm (67\")   Wt 122 kg (268 lb)   LMP 05/01/2020 (Exact Date)   BMI 41.97 kg/m²     Physical Exam   Constitutional: She is oriented to person, place, and time. She appears well-developed and well-nourished. No distress.   HENT:   Head: Normocephalic and atraumatic.   Eyes: EOM are normal.   Neck: Normal range of motion. No thyromegaly present.   Pulmonary/Chest: Effort normal.   Abdominal: Soft. She exhibits no distension. There is no tenderness.   Musculoskeletal: Normal range of motion.   Neurological: She is alert and oriented to person, place, and time.   Skin: Skin is warm and dry.   Psychiatric: She has a normal mood and affect. Her behavior is normal. Judgment normal.   Nursing note and vitals reviewed.      Lab Review   No data reviewed    Imaging   CT of abdomen/pelvis report  Pelvic ultrasound report     Assessment & Plan    My was seen today for ovarian cyst.    Diagnoses and all orders for this visit:    PCOS (polycystic ovarian syndrome): Patient recently to ER with ruptured R ovarian cyst.  Normal ovulation cycle explained, patient understands this is a normal process that will occasionally be disruptive or painful.  She has no discomfort at this time and has no concerns.  The patient will continue her Loestrin and return to the office in a couple of months for her next scheduled follow-up.    Right ovarian cyst    Former smoker      This note was electronically signed.    Deborah Hays MD  June 4, 2020  10:02    Total time spent today with My  was 20 minutes (level 3).  Greater than 50% of the time was spent coordinating care, answering her questions and counseling regarding pathophysiology of her presenting problem along with plans for any diagnositc work-up and treatment.      "

## 2020-06-08 DIAGNOSIS — F98.8 ATTENTION DEFICIT DISORDER (ADD) WITHOUT HYPERACTIVITY: ICD-10-CM

## 2020-06-08 RX ORDER — METHYLPHENIDATE HYDROCHLORIDE 10 MG/1
TABLET ORAL
Qty: 60 TABLET | Refills: 0 | Status: SHIPPED | OUTPATIENT
Start: 2020-06-08 | End: 2020-07-27

## 2020-07-27 DIAGNOSIS — F98.8 ATTENTION DEFICIT DISORDER (ADD) WITHOUT HYPERACTIVITY: ICD-10-CM

## 2020-07-27 RX ORDER — METHYLPHENIDATE HYDROCHLORIDE 10 MG/1
TABLET ORAL
Qty: 60 TABLET | Refills: 0 | Status: SHIPPED | OUTPATIENT
Start: 2020-07-27 | End: 2020-09-04

## 2020-07-28 ENCOUNTER — PRIOR AUTHORIZATION (OUTPATIENT)
Dept: FAMILY MEDICINE CLINIC | Facility: CLINIC | Age: 25
End: 2020-07-28

## 2020-07-28 NOTE — TELEPHONE ENCOUNTER
PA done by phone for generic Ritalin 10 mg bid, and approved 7-28-20 to 7-28-23. Called MD1 and ran and went through, called to advised pt

## 2020-08-25 RX ORDER — CITALOPRAM 20 MG/1
TABLET ORAL
Qty: 90 TABLET | Refills: 1 | Status: SHIPPED | OUTPATIENT
Start: 2020-08-25 | End: 2021-05-25 | Stop reason: SDUPTHER

## 2020-09-04 DIAGNOSIS — F98.8 ATTENTION DEFICIT DISORDER (ADD) WITHOUT HYPERACTIVITY: ICD-10-CM

## 2020-09-04 RX ORDER — METHYLPHENIDATE HYDROCHLORIDE 10 MG/1
TABLET ORAL
Qty: 60 TABLET | Refills: 0 | Status: SHIPPED | OUTPATIENT
Start: 2020-09-04 | End: 2020-10-09

## 2020-10-06 ENCOUNTER — OFFICE VISIT (OUTPATIENT)
Dept: FAMILY MEDICINE CLINIC | Facility: CLINIC | Age: 25
End: 2020-10-06

## 2020-10-06 VITALS
HEIGHT: 67 IN | SYSTOLIC BLOOD PRESSURE: 118 MMHG | RESPIRATION RATE: 16 BRPM | BODY MASS INDEX: 41.5 KG/M2 | HEART RATE: 108 BPM | DIASTOLIC BLOOD PRESSURE: 76 MMHG | WEIGHT: 264.4 LBS | OXYGEN SATURATION: 98 % | TEMPERATURE: 97.1 F

## 2020-10-06 DIAGNOSIS — Z11.1 VISIT FOR TB SKIN TEST: Primary | ICD-10-CM

## 2020-10-06 DIAGNOSIS — F98.8 ATTENTION DEFICIT DISORDER (ADD) WITHOUT HYPERACTIVITY: ICD-10-CM

## 2020-10-06 DIAGNOSIS — E66.9 OBESITY, UNSPECIFIED CLASSIFICATION, UNSPECIFIED OBESITY TYPE, UNSPECIFIED WHETHER SERIOUS COMORBIDITY PRESENT: Chronic | ICD-10-CM

## 2020-10-06 DIAGNOSIS — F32.A DEPRESSION, UNSPECIFIED DEPRESSION TYPE: Chronic | ICD-10-CM

## 2020-10-06 DIAGNOSIS — K21.9 GASTROESOPHAGEAL REFLUX DISEASE, UNSPECIFIED WHETHER ESOPHAGITIS PRESENT: ICD-10-CM

## 2020-10-06 DIAGNOSIS — F41.9 ANXIETY: Chronic | ICD-10-CM

## 2020-10-06 DIAGNOSIS — D64.9 ANEMIA, UNSPECIFIED TYPE: ICD-10-CM

## 2020-10-06 DIAGNOSIS — E61.1 IRON DEFICIENCY: ICD-10-CM

## 2020-10-06 DIAGNOSIS — J30.89 NON-SEASONAL ALLERGIC RHINITIS, UNSPECIFIED TRIGGER: Chronic | ICD-10-CM

## 2020-10-06 PROCEDURE — 99395 PREV VISIT EST AGE 18-39: CPT | Performed by: FAMILY MEDICINE

## 2020-10-06 PROCEDURE — 86580 TB INTRADERMAL TEST: CPT | Performed by: FAMILY MEDICINE

## 2020-10-06 NOTE — PROGRESS NOTES
Subjective   My Chavez is a 25 y.o. female presenting with chief complaint of:   Chief Complaint   Patient presents with   • Annual Exam   • Med Management   • TB Skin test       History of Present Illness :  Alone.       Has few chronic problems to consider that might have a bearing on today's issues;  an interval appointment.       Chronic/acute problems reviewed today:   1. Visit for TB skin test acute request a day is required by her school; she is never been exposed to known TB   2. Non-seasonal allergic rhinitis, unspecified trigger Chronic/variable.   On/off eye, sinus, nasal congestion and drainage.  Rx helps.  Aware of options additional medications, testing and not interested.     3. Gastroesophageal reflux disease, unspecified whether esophagitis present Chronic/stable.  Controlled heartburn, reflux without dysphagia, melena.  Rx used helps, periods not used proven needed with symptoms -currently doing ok.      4. Obesity, unspecified classification, unspecified obesity type, unspecified whether serious comorbidity present Chronic/stable.  Aware, advised weight loss before.  On/off some success with weight loss but often with weight gain back.      5. Iron deficiency history of iron deficiency.  She recently checked at her work and the level was okay; she stopped her oral replacement.  Her periods are under better control that she is on OCAs.  No rectal bleeding   6. Anemia, unspecified type see iron   7. Depression, unspecified depression type chronic/stable without significant  mood swings, down moods, nervousness, difficulty with concentration to function home/work.  Others close have not been concerned.  No suicide ideation/intent.  Rx helps      8. Anxiety Chronic/stable:   On/off anxiety tolerated well.  Rx helps and not interested in Rx change. Stress ongoing work/school ill grandfather.      9. Attention deficit disorder (ADD) without hyperactivity chronic even retrospective difficulty with  concentration.  Since she has been on medication her grades to come from see they; she is much more focused.  She denies headaches anorexia though she has lost some weight; she says she is eating regular and nutritious.  She denies insomnia palpitations.     Has an/another acute issue today: None.    The following portions of the patient's history were reviewed and updated as appropriate: allergies, current medications, past family history, past medical history, past social history, past surgical history and problem list.      Current Outpatient Medications:   •  acetaminophen (TYLENOL) 325 MG tablet, Take 650 mg by mouth Every 6 (Six) Hours As Needed for Mild Pain  (Takes 2 tablets as needed.)., Disp: , Rfl:   •  ALPRAZolam (XANAX) 0.5 MG tablet, TAKE 1/2 TABLET THREE TIMESDAILY AS NEEDED FOR ANXIETY, Disp: 45 tablet, Rfl: 1  •  citalopram (CeleXA) 20 MG tablet, TAKE 1 TABLET DAILY, Disp: 90 tablet, Rfl: 1  •  methylphenidate (RITALIN) 10 MG tablet, TAKE ONE TABLET TWICE DAILY (NEED APPOINTMENT FOR REFILLS), Disp: 60 tablet, Rfl: 0  •  norethindrone-ethinyl estradiol (Loestrin 1/20, 21,) 1-20 MG-MCG per tablet, Take 1 tablet by mouth Daily., Disp: 21 tablet, Rfl: 12  •  pantoprazole (PROTONIX) 40 MG EC tablet, TAKE 1 TABLET DAILY, Disp: 90 tablet, Rfl: 3    No problems with medications.  Refills if needed done    Allergies   Allergen Reactions   • Contrave [Naltrexone-Bupropion Hcl Er] Nausea Only     Nausea         Review of Systems  GENERAL:  Active home/work; minimal/infrequent exercise. . Sleep is ok ; no apnea. No fevers.  SKIN: No rash/skin lesion of concern:   ENDO:  No syncope, near or diaphoretic sweaty s Josy pells.  HEENT: No recent head injury; same occ diffuse headache.   No vision change.  No hearing loss.  Ears without pain/drainage.  No sore throat.  No nasal/sinus congestion/drainage. No epistaxis.  CHEST: No chest wall tenderness or mass.,.  No cough, without wheeze.  No SOB; no hemoptysis.  CV:  No chest pain, palpitations, ankle edema.  GI: No dysphagia.  No abdominal pain, diarrhea, constipation.  No rectal bleeding, or melena.  Occ heartburn.    :  Voids without dysuria, or  incontinence to completion.  GYN: Menses less heavy; has gyne  ORTHO: No painful/swollen joints but various on /off sore.  No sore neck or back.  No acute neck or back pain without recent injury.  NEURO: No dizziness, weakness of extremities.  No numbness/paresthesias.   PSYCH: No memory loss.  Mood good; variable anxious, depressed but/and not suicidal.  Tries to tolerate stress .   Screening:  Mammogram: NA  Bone density: NA  Low dose CT chest: Tobacco-smoker/none: NA  GI:   Colon-neg/Chano//10.2.17  EGD-itis/Chano//10.2.17  Prostate: NA  Usual lab order  3m CBC, iron  12m CBC, CMP, LIPID, TSH, fT4, iron     Lab Results:  Results for orders placed or performed during the hospital encounter of 05/28/20   Comprehensive Metabolic Panel    Specimen: Blood   Result Value Ref Range    Glucose 96 65 - 99 mg/dL    BUN 10 6 - 20 mg/dL    Creatinine 0.63 0.57 - 1.00 mg/dL    Sodium 139 136 - 145 mmol/L    Potassium 4.0 3.5 - 5.2 mmol/L    Chloride 103 98 - 107 mmol/L    CO2 20.0 (L) 22.0 - 29.0 mmol/L    Calcium 9.3 8.6 - 10.5 mg/dL    Total Protein 7.3 6.0 - 8.5 g/dL    Albumin 4.40 3.50 - 5.20 g/dL    ALT (SGPT) 36 (H) 1 - 33 U/L    AST (SGOT) 20 1 - 32 U/L    Alkaline Phosphatase 69 39 - 117 U/L    Total Bilirubin 0.4 0.2 - 1.2 mg/dL    eGFR Non African Amer 116 >60 mL/min/1.73    Globulin 2.9 gm/dL    A/G Ratio 1.5 g/dL    BUN/Creatinine Ratio 15.9 7.0 - 25.0    Anion Gap 16.0 (H) 5.0 - 15.0 mmol/L   Lipase    Specimen: Blood   Result Value Ref Range    Lipase 23 13 - 60 U/L   CBC Auto Differential    Specimen: Blood   Result Value Ref Range    WBC 10.19 3.40 - 10.80 10*3/mm3    RBC 4.82 3.77 - 5.28 10*6/mm3    Hemoglobin 12.1 12.0 - 15.9 g/dL    Hematocrit 36.9 34.0 - 46.6 %    MCV 76.6 (L) 79.0 - 97.0 fL    MCH 25.1 (L) 26.6 -  33.0 pg    MCHC 32.8 31.5 - 35.7 g/dL    RDW 14.1 12.3 - 15.4 %    RDW-SD 38.7 37.0 - 54.0 fl    MPV 10.1 6.0 - 12.0 fL    Platelets 276 140 - 450 10*3/mm3    Neutrophil % 74.8 42.7 - 76.0 %    Lymphocyte % 17.1 (L) 19.6 - 45.3 %    Monocyte % 4.9 (L) 5.0 - 12.0 %    Eosinophil % 2.3 0.3 - 6.2 %    Basophil % 0.6 0.0 - 1.5 %    Immature Grans % 0.3 0.0 - 0.5 %    Neutrophils, Absolute 7.63 (H) 1.70 - 7.00 10*3/mm3    Lymphocytes, Absolute 1.74 0.70 - 3.10 10*3/mm3    Monocytes, Absolute 0.50 0.10 - 0.90 10*3/mm3    Eosinophils, Absolute 0.23 0.00 - 0.40 10*3/mm3    Basophils, Absolute 0.06 0.00 - 0.20 10*3/mm3    Immature Grans, Absolute 0.03 0.00 - 0.05 10*3/mm3    nRBC 0.0 0.0 - 0.2 /100 WBC   Amylase    Specimen: Blood   Result Value Ref Range    Amylase 53 28 - 100 U/L   hCG, Serum, Qualitative    Specimen: Blood   Result Value Ref Range    HCG Qualitative Negative Negative   Urinalysis With Culture If Indicated - Urine, Clean Catch    Specimen: Urine, Clean Catch   Result Value Ref Range    Color, UA Dark Yellow (A) Yellow, Straw    Appearance, UA Clear Clear    pH, UA <=5.0 5.0 - 8.0    Specific Gravity, UA 1.029 1.005 - 1.030    Glucose, UA Negative Negative    Ketones, UA 15 mg/dL (1+) (A) Negative    Bilirubin, UA Negative Negative    Blood, UA Negative Negative    Protein, UA Negative Negative    Leuk Esterase, UA Negative Negative    Nitrite, UA Negative Negative    Urobilinogen, UA 0.2 E.U./dL 0.2 - 1.0 E.U./dL   POCT pregnancy, urine    Specimen: Urine   Result Value Ref Range    HCG, Urine, QL Negative Negative    Lot Number \[id number]\     Internal Positive Control Positive     Internal Negative Control Negative    Light Blue Top   Result Value Ref Range    Extra Tube hold for add-on    Green Top (Gel)   Result Value Ref Range    Extra Tube Hold for add-ons.    Lavender Top   Result Value Ref Range    Extra Tube hold for add-on    Red Top   Result Value Ref Range    Extra Tube Hold for add-ons.   "      A1C:No results for input(s): HGBA1C in the last 98992 hours.  PSA:No results for input(s): PSA in the last 43463 hours.  CBC:  Lab Results - Last 18 Months   Lab Units 05/28/20 1902   WBC 10*3/mm3 10.19   HEMOGLOBIN g/dL 12.1   HEMATOCRIT % 36.9   PLATELETS 10*3/mm3 276      BMP/CMP:  Lab Results - Last 18 Months   Lab Units 05/28/20  1902   SODIUM mmol/L 139   POTASSIUM mmol/L 4.0   CHLORIDE mmol/L 103   CO2 mmol/L 20.0*   BUN mg/dL 10   CREATININE mg/dL 0.63   EGFR IF NONAFRICN AM mL/min/1.73 116   CALCIUM mg/dL 9.3     HEPATIC:  Lab Results - Last 18 Months   Lab Units 05/28/20 1902   ALT (SGPT) U/L 36*   AST (SGOT) U/L 20   ALK PHOS U/L 69     THYROID:No results for input(s): TSH, T3FREE, FTI in the last 60634 hours.    Invalid input(s): T4FREE, T3, T4, TEUP,  TOTALT4    Objective   /76   Pulse 108   Temp 97.1 °F (36.2 °C)   Resp 16   Ht 170.2 cm (67\")   Wt 120 kg (264 lb 6.4 oz)   SpO2 98%   Breastfeeding No   BMI 41.41 kg/m²   Body mass index is 41.41 kg/m².    Recent Vitals       5/28/2020 6/4/2020 10/6/2020       BP:  135/78  122/82  118/76     Pulse:  84  --  108     Temp:  98.4 °F (36.9 °C)  --  97.1 °F (36.2 °C)     Weight:  --  122 kg (268 lb)  120 kg (264 lb 6.4 oz)     BMI (Calculated):  --  42  41.4           Physical Exam  GENERAL:  Well nourished/developed in no acute distress.   SKIN: Turgor excellent, without wound, rash, lesion.  HEENT: Normal cephalic without trauma.  Pupils equal round reactive to light. Extraocular motions full without nystagmus.   External canals nonobstructive nontender without reddness. Tymphatic membranes raman with cheikh structures intact.   Oral cavity without growths, exudates, and moist.  Posterior pharynx without mass, obstruction, redness.  No thyromegaly, mass, tenderness, lymphadenopathy and supple.  CV: Regular rhythm.  No murmur, gallop,  edema. Posterior pulses intact.  No carotid bruits.  CHEST: No chest wall tenderness or mass.   LUNGS: " Symmetric motion with clear to auscultation.  No dullness to percussion  ABD: Soft, nontender without mass.   ORTHO: Symmetric extremities without swelling/point tenderness.  Full gross range of motion.  NEURO: CN 2-12 grossly intact.  Symmetric facies and UE/LE. 3/5 strength throughout. Nonfocal use extremities. Speech clear.   PSYCH: Oriented x 3.  Pleasant calm, well kept.  Purposeful/directed conservation with intact short/long gross memory.     Assessment/Plan     1. Visit for TB skin test    2. Non-seasonal allergic rhinitis, unspecified trigger    3. Gastroesophageal reflux disease, unspecified whether esophagitis present    4. Obesity, unspecified classification, unspecified obesity type, unspecified whether serious comorbidity present    5. Iron deficiency    6. Anemia, unspecified type    7. Depression, unspecified depression type    8. Anxiety    9. Attention deficit disorder (ADD) without hyperactivity      Discussions:   TB applied  Same Rx; report problems  Lab before next visit    Rx: reviewed/changes:  No orders of the defined types were placed in this encounter.      LAB/Testing/Referrals: reviewed/orders:   Today:   Orders Placed This Encounter   Procedures   • TB Skin Test     Chronic/recurrent labs above or change to:   same     Body mass index is 41.41 kg/m².  Patient's Body mass index is 41.41 kg/m². BMI is above normal parameters. Recommendations include: exercise counseling, nutrition counseling and as before.      Tobacco use reviewed:    My Chavez  reports that she quit smoking about 3 years ago. Her smoking use included cigarettes. She started smoking about 6 years ago. She smoked 0.10 packs per day. She has never used smokeless tobacco..  Annual/wellness visit: also/today; Counseling/Anticipatory Guidance: Patient was counseled on:  Nutrition, physical activity, healthy weight, injury prevention, misuse of tobacco, alcohol and drugs, sexual behavior and STDs, contraception, dental  health, mental health, immunizations, screenings as appropriate.       Patient Instructions   CBC, CMP, LIPID, TSH, fT4, iron         Follow up: Return for Dr Nuñez-, 6 m;.  Future Appointments   Date Time Provider Department Center   4/8/2021  9:30 AM Mak Nuñze MD [facility] None       Procedures none

## 2020-10-08 LAB
INDURATION: 0 MM (ref 0–10)
Lab: NORMAL
Lab: NORMAL
TB SKIN TEST: NORMAL

## 2020-10-09 DIAGNOSIS — F98.8 ATTENTION DEFICIT DISORDER (ADD) WITHOUT HYPERACTIVITY: ICD-10-CM

## 2020-10-09 RX ORDER — METHYLPHENIDATE HYDROCHLORIDE 10 MG/1
TABLET ORAL
Qty: 60 TABLET | Refills: 0 | Status: SHIPPED | OUTPATIENT
Start: 2020-10-09 | End: 2020-11-10

## 2020-11-09 DIAGNOSIS — F98.8 ATTENTION DEFICIT DISORDER (ADD) WITHOUT HYPERACTIVITY: ICD-10-CM

## 2020-11-10 RX ORDER — METHYLPHENIDATE HYDROCHLORIDE 10 MG/1
10 TABLET ORAL 2 TIMES DAILY
Qty: 60 TABLET | Refills: 0 | Status: SHIPPED | OUTPATIENT
Start: 2020-11-10 | End: 2020-12-16

## 2020-11-10 NOTE — TELEPHONE ENCOUNTER
Last OV 10/06/20, lst filled 10/09/20     Requested Prescriptions     Pending Prescriptions Disp Refills   • methylphenidate (RITALIN) 10 MG tablet [Pharmacy Med Name: METHYLPHENIDATE 10 MG TABLET] 60 tablet 0     Sig: Take 1 tablet by mouth 2 (Two) Times a Day.

## 2020-12-08 ENCOUNTER — OFFICE VISIT (OUTPATIENT)
Dept: OBSTETRICS AND GYNECOLOGY | Facility: CLINIC | Age: 25
End: 2020-12-08

## 2020-12-08 VITALS
BODY MASS INDEX: 41.12 KG/M2 | SYSTOLIC BLOOD PRESSURE: 122 MMHG | WEIGHT: 262 LBS | DIASTOLIC BLOOD PRESSURE: 78 MMHG | HEIGHT: 67 IN

## 2020-12-08 DIAGNOSIS — E66.01 CLASS 3 SEVERE OBESITY DUE TO EXCESS CALORIES WITHOUT SERIOUS COMORBIDITY WITH BODY MASS INDEX (BMI) OF 40.0 TO 44.9 IN ADULT (HCC): ICD-10-CM

## 2020-12-08 DIAGNOSIS — N93.8 DUB (DYSFUNCTIONAL UTERINE BLEEDING): Primary | ICD-10-CM

## 2020-12-08 DIAGNOSIS — Z30.09 ENCOUNTER FOR OTHER GENERAL COUNSELING OR ADVICE ON CONTRACEPTION: ICD-10-CM

## 2020-12-08 PROCEDURE — 99214 OFFICE O/P EST MOD 30 MIN: CPT | Performed by: OBSTETRICS & GYNECOLOGY

## 2020-12-08 RX ORDER — PHENTERMINE HYDROCHLORIDE 37.5 MG/1
37.5 CAPSULE ORAL EVERY MORNING
Qty: 30 CAPSULE | Refills: 2 | Status: SHIPPED | OUTPATIENT
Start: 2020-12-08 | End: 2021-05-25

## 2020-12-08 RX ORDER — ETONOGESTREL AND ETHINYL ESTRADIOL 11.7; 2.7 MG/1; MG/1
1 INSERT, EXTENDED RELEASE VAGINAL
Qty: 1 EACH | Refills: 12 | Status: SHIPPED | OUTPATIENT
Start: 2020-12-08 | End: 2021-03-09 | Stop reason: SDUPTHER

## 2020-12-08 NOTE — PROGRESS NOTES
"Subjective   Chief Complaint   Patient presents with   • Contraception     pt here today wanting to discuss birth control. pt voices no other concerns.      My Chavez is a 25 y.o. year old No obstetric history on file..  Patient's last menstrual period was 2020 (approximate).  She presents to be seen for discussion of contractive option.  She was not able to take OCP's reliably because she was working shift work and sometimes took it in the am and other days in the pm.  Patient reports OCP's did increase her acne, but she is unsure whether that was the pills or stopping the pills.  Otherwise she was pleased.    Patient also wants help with weight loss.  She has been going to the gym 2-3 times per week and is very diligent.  Patient does report that she eats a lot - some out of boredom and some to stay awake.    The following portions of the patient's history were reviewed and updated as appropriate:current medications and allergies    Social History    Tobacco Use      Smoking status: Former Smoker        Packs/day: 0.10        Types: Cigarettes        Start date: 2014        Quit date: 10/27/2016        Years since quittin.1      Smokeless tobacco: Never Used    Review of Systems   Constitutional: Negative for activity change and unexpected weight change.   Respiratory: Negative for shortness of breath.    Cardiovascular: Negative for chest pain.   Genitourinary: Positive for menstrual problem (irregular bleeding secondary to inconsistent timing of her OCP's from day to day). Negative for vaginal discharge and vaginal pain.         Objective   /78   Ht 170.2 cm (67\")   Wt 119 kg (262 lb)   LMP 2020 (Approximate)   BMI 41.04 kg/m²     Physical Exam  Vitals signs and nursing note reviewed.   Constitutional:       General: She is not in acute distress.     Appearance: She is well-developed.   HENT:      Head: Normocephalic and atraumatic.   Neck:      Musculoskeletal: Normal range of " motion.      Thyroid: No thyromegaly.   Pulmonary:      Effort: Pulmonary effort is normal.   Abdominal:      General: There is no distension.      Palpations: Abdomen is soft.      Tenderness: There is no abdominal tenderness.   Musculoskeletal: Normal range of motion.   Skin:     General: Skin is warm and dry.   Neurological:      Mental Status: She is alert and oriented to person, place, and time.   Psychiatric:         Behavior: Behavior normal.         Judgment: Judgment normal.         Lab Review   No data reviewed    Imaging   No data reviewed     Assessment & Plan    Diagnoses and all orders for this visit:    1. DUB (dysfunctional uterine bleeding) (Primary): After discussion of all of her options, the patient would like to try NuvaRing, and plans to do so continuously.  Patient understands there may be some irregular spotting as she is switching from 1 method to another; she will return to the office in 2 months to discuss her thoughts on NuvaRing.  If this is not satisfactory, she is planning to pursue a Mirena IUD  -     etonogestrel-ethinyl estradiol (NuvaRing) 0.12-0.015 MG/24HR vaginal ring; Insert 1 each into the vagina Every 28 (Twenty-Eight) Days. Insert vaginally and leave in place for 3 consecutive weeks, then remove for 1 week.  Dispense: 1 each; Refill: 12    2. Class 3 severe obesity due to excess calories without serious comorbidity with body mass index (BMI) of 40.0 to 44.9 in adult (CMS/HCC): Patient had previously been offered assistance with weight loss.  She comes in today asking for help.  We have discussed multiple strategies for including more exercise and her last all choices.  We have also reviewed careful assessment about motivation for eating, and specifically trying to avoid those calories that she eats out of boredom.  At least 15 minutes spent simply discussing the patient's weight and lifestyle choices  -     phentermine 37.5 MG capsule; Take 1 capsule by mouth Every Morning.   Dispense: 30 capsule; Refill: 2    3. Encounter for other general counseling or advice on contraception: Reviewed pros and cons of various contraceptive methods, including Nexplanon, Depo-Provera, Mirena IUD, combination oral contraceptive pills, and NuvaRing.  Patient's questions were all answered to her satisfaction      This note was electronically signed.    Deborah Hays MD  December 8, 2020  13:30 CST    Total time spent today with My  was 15 minutes (level 3).  Greater than 50% of the time was spent coordinating care, answering her questions and counseling regarding pathophysiology of her presenting problem along with plans for any diagnositc work-up and treatment.

## 2020-12-14 ENCOUNTER — TELEMEDICINE (OUTPATIENT)
Dept: FAMILY MEDICINE CLINIC | Facility: CLINIC | Age: 25
End: 2020-12-14

## 2020-12-14 ENCOUNTER — CLINICAL SUPPORT (OUTPATIENT)
Dept: FAMILY MEDICINE CLINIC | Facility: CLINIC | Age: 25
End: 2020-12-14

## 2020-12-14 ENCOUNTER — TELEPHONE (OUTPATIENT)
Dept: FAMILY MEDICINE CLINIC | Facility: CLINIC | Age: 25
End: 2020-12-14

## 2020-12-14 VITALS — HEART RATE: 100 BPM | TEMPERATURE: 103 F | OXYGEN SATURATION: 100 %

## 2020-12-14 DIAGNOSIS — J02.9 SORE THROAT: ICD-10-CM

## 2020-12-14 DIAGNOSIS — R50.9 FEVER AND CHILLS: ICD-10-CM

## 2020-12-14 DIAGNOSIS — Z20.822 CLOSE EXPOSURE TO COVID-19 VIRUS: ICD-10-CM

## 2020-12-14 DIAGNOSIS — R05.9 COUGH: ICD-10-CM

## 2020-12-14 DIAGNOSIS — Z20.822 SUSPECTED COVID-19 VIRUS INFECTION: Primary | ICD-10-CM

## 2020-12-14 PROCEDURE — 99000 SPECIMEN HANDLING OFFICE-LAB: CPT | Performed by: FAMILY MEDICINE

## 2020-12-14 PROCEDURE — 99442 PR PHYS/QHP TELEPHONE EVALUATION 11-20 MIN: CPT | Performed by: FAMILY MEDICINE

## 2020-12-14 NOTE — PROGRESS NOTES
Subjective   My Chavez is a 25 y.o. female presenting with chief complaint of:   Chief Complaint   Patient presents with   • Cough     You have chosen to receive care through a telephone visit. Do you consent to use a telephone visit for your medical care today? Yes  This visit has been rescheduled as a phone visit to comply with patient safety concerns in accordance with CDC recommendations. Total time of discussion was 11 minutes.    History of Present Illness :  Alone.  Here for primarily an acute issue today ? COVID.  Called today     Concerns or Questions if Applicable: PATIENT HAS BEEN AROUND FAMILY WHO ARE ALL COVID CONFIRMED POSITIVE. PATIENT STARTED EXPERIENCING SYMPTOMS THIS MORNING. PATIENT STATED SHE HAS CHEST PAIN, COUGH, AND SORE THROAT. PATIENT WOULD LIKE A COVID TEST ORDERED. PLEASE CALLBACK AND ADVISE.       L[facility]  Exposure person: father/? Work (lab [employer])  Exposure date: continuous (day ?)  Exposure place: ? Home/work  Exposure without (at times) mast  Length of exposure: > 15min    Date of first symptom: 12.13.20 (day 1)  Symptoms positive: fatigue, headache, sore throat, cough (dry-productive),   Symptoms neg:  temp > 100.4, sore throat,  Wheeze, SOB, nausea, diarrhea, loss taste, loss smell,    Date of test: 12.14.20     Site of test: /[place]       Advice: self isolate (for those + test) (stay in a bedroom by yourself; open window some if able; food brought to you; dont join the family meals, TV, etc.later?  Advice: quarantine (for exposed without dx) (stay in home and distance yourself as much as possible from family; masks, > 6feet, < 15m/24 hr  Advice: social distancing (masks, 6 ft distance, < 15m/24 hr exposure to others).  Avoid unnecessary outings/gathering consistent with state/health department guidance after isolate/quarantine is over.  Work: none; work note will be written as needed by office-patient to request date  School: NA; video if an option    ER if any  worsening especially SOB; as soon as SOB  Health department will be notified if they are positive; they should hear from them at sometime  Contacts should be tested if/when patient test returns positive; due to volume best patient call friends, family, contacts if able (health department may be slow at doing this)    Test back: pending    Test result pending     Return to social distancing dates below  + test: 10 days from onset symptoms and no symptoms especially temp > 100.4 (pending)  - test:   14 days from firm exposure (pending)  10 days from firm exposure without a test if no symptoms (pending); must agree to ADDITIONAL social distancing through day 14 (correct mask, social distance, hand washing, enviromental cleaning, avoid crowds-those increased risk, adequate indoor ventilation, report any symptoms) (pending)  7-8 days from firm exposure with testing on day 6 and that test coming back negative; must agree to ADDITIONAL social distancing above through day 14 (pending)    Do not rely on disease/antibodies for protection for infection future  Stay abreast for future recommendations for vaccination (even if had disease)    Has multiple chronic problems to consider that might have a bearing on today's issues; not an interval appointment.       Chronic/acute problems reviewed today:   1. Cough    2. Sore throat    3. Close exposure to COVID-19 virus      Has an/another acute issue today: none.    The following portions of the patient's history were reviewed and updated as appropriate: allergies, current medications, past family history, past medical history, past social history, past surgical history and problem list.      Current Outpatient Medications:   •  acetaminophen (TYLENOL) 325 MG tablet, Take 650 mg by mouth Every 6 (Six) Hours As Needed for Mild Pain  (Takes 2 tablets as needed.)., Disp: , Rfl:   •  ALPRAZolam (XANAX) 0.5 MG tablet, TAKE 1/2 TABLET THREE TIMESDAILY AS NEEDED FOR ANXIETY, Disp: 45  tablet, Rfl: 1  •  citalopram (CeleXA) 20 MG tablet, TAKE 1 TABLET DAILY, Disp: 90 tablet, Rfl: 1  •  etonogestrel-ethinyl estradiol (NuvaRing) 0.12-0.015 MG/24HR vaginal ring, Insert 1 each into the vagina Every 28 (Twenty-Eight) Days. Insert vaginally and leave in place for 3 consecutive weeks, then remove for 1 week., Disp: 1 each, Rfl: 12  •  methylphenidate (RITALIN) 10 MG tablet, Take 1 tablet by mouth 2 (Two) Times a Day., Disp: 60 tablet, Rfl: 0  •  pantoprazole (PROTONIX) 40 MG EC tablet, TAKE 1 TABLET DAILY, Disp: 90 tablet, Rfl: 3  •  phentermine 37.5 MG capsule, Take 1 capsule by mouth Every Morning., Disp: 30 capsule, Rfl: 2    No problems with medications.  Refills if needed done    Allergies   Allergen Reactions   • Contrave [Naltrexone-Bupropion Hcl Er] Nausea Only     Nausea         Review of Systems  Symptoms positive: fatigue, headache, sore throat, cough (dry-productive),   Symptoms neg:  temp > 100.4, sore throat,  Wheeze, SOB, nausea, diarrhea, loss taste, loss smell,    Lab Results:  Results for orders placed or performed in visit on 10/06/20   TB Skin Test    Specimen: Blood   Result Value Ref Range    TB Skin Test Non-Reactive     Induration 0 0 - 10 mm    Injection Date & Time 10/6/2020 0945     Read Date & Time 10/08/20  1353        A1C:No results for input(s): HGBA1C in the last 69428 hours.  PSA:No results for input(s): PSA in the last 76344 hours.  CBC:  Lab Results - Last 18 Months   Lab Units 05/28/20  1902   WBC 10*3/mm3 10.19   HEMOGLOBIN g/dL 12.1   HEMATOCRIT % 36.9   PLATELETS 10*3/mm3 276      BMP/CMP:  Lab Results - Last 18 Months   Lab Units 05/28/20  1902   SODIUM mmol/L 139   POTASSIUM mmol/L 4.0   CHLORIDE mmol/L 103   CO2 mmol/L 20.0*   BUN mg/dL 10   CREATININE mg/dL 0.63   EGFR IF NONAFRICN AM mL/min/1.73 116   CALCIUM mg/dL 9.3     HEPATIC:  Lab Results - Last 18 Months   Lab Units 05/28/20  1902   ALT (SGPT) U/L 36*   AST (SGOT) U/L 20   ALK PHOS U/L 69     THYROID:No  results for input(s): TSH, T3FREE, FTI in the last 83741 hours.    Invalid input(s): T4FREE, T3, T4, TEUP,  TOTALT4    Objective       Physical Exam  None; due to telephone/COVID19  Was alert, oriented x 3, pleasant.     Assessment/Plan     1. Cough    2. Sore throat    3. Close exposure to COVID-19 virus        Rx: reviewed/changes:  No orders of the defined types were placed in this encounter.      LAB/Testing/Referrals: reviewed/orders:   Today: covid test /[place]  No orders of the defined types were placed in this encounter.    Chronic/recurrent labs above or change to:   same     Discussions:   above      There are no Patient Instructions on file for this visit.    Follow up: Return for lab;, Dr Nuñez-, as planned;.  Future Appointments   Date Time Provider Department Center   2/9/2021  1:30 PM Deborah Hays MD [facility] None   4/8/2021  9:30 AM Mak Nuñez MD [facility]

## 2020-12-14 NOTE — PROGRESS NOTES
Tylenol; cool bath as attempt to control temp      Patient at drive up for Covid 19 swab, done per oropharynx as ordered, patient tolerated well, specimen secured and placed in freezer with order as directed.  Pt did have temp 103.0, pulse 100, sat 100%

## 2020-12-15 LAB — SARS-COV-2 RNA RESP QL NAA+PROBE: NOT DETECTED

## 2020-12-15 NOTE — PROGRESS NOTES
"Notified pt and advised I took tyenol and motrin alternating and \"I definitely  broke a sweat, I woke up in a puddle\"  "

## 2020-12-16 ENCOUNTER — TELEPHONE (OUTPATIENT)
Dept: FAMILY MEDICINE CLINIC | Facility: CLINIC | Age: 25
End: 2020-12-16

## 2020-12-16 DIAGNOSIS — F98.8 ATTENTION DEFICIT DISORDER (ADD) WITHOUT HYPERACTIVITY: ICD-10-CM

## 2020-12-16 DIAGNOSIS — J40 BRONCHITIS: Primary | ICD-10-CM

## 2020-12-16 RX ORDER — ALBUTEROL SULFATE 90 UG/1
2 AEROSOL, METERED RESPIRATORY (INHALATION)
Qty: 8 G | Refills: 0 | Status: SHIPPED | OUTPATIENT
Start: 2020-12-16 | End: 2021-03-09

## 2020-12-16 RX ORDER — BUDESONIDE 90 UG/1
2 AEROSOL, POWDER RESPIRATORY (INHALATION)
Qty: 1 INHALER | Refills: 0 | Status: SHIPPED | OUTPATIENT
Start: 2020-12-16 | End: 2021-03-09

## 2020-12-16 RX ORDER — METHYLPHENIDATE HYDROCHLORIDE 10 MG/1
TABLET ORAL
Qty: 60 TABLET | Refills: 0 | Status: SHIPPED | OUTPATIENT
Start: 2020-12-16 | End: 2021-02-04

## 2020-12-16 NOTE — TELEPHONE ENCOUNTER
Not heartburn  No fever for 2 days  No SOB    Decision to try pulmicort/proair  Decision to for-go oral steroid yet  Decision to not return to work till 12.23.20; 10 days from 1st sxs (note written)  Offered retest if needed    ########################      Lives [facility]  Exposure person: father/? 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 Home/work  Exposure without (at times) mast  Length of exposure: > 15min     Date of first symptom: 12.13.20 (day 1)  Symptoms positive: fatigue, headache, sore throat, cough (dry-productive),   Symptoms neg:  temp > 100.4, sore throat,  Wheeze, SOB, nausea, diarrhea, loss taste, loss smell,     Date of test: 12.14.20     Site of test: /[place]        Advice: self isolate (for those + test) (stay in a bedroom by yourself; open window some if able; food brought to you; dont join the family meals, TV, etc.later?  Advice: quarantine (for exposed without dx) (stay in home and distance yourself as much as possible from family; masks, > 6feet, < 15m/24 hr  Advice: social distancing (masks, 6 ft distance, < 15m/24 hr exposure to others).  Avoid unnecessary outings/gathering consistent with state/health department guidance after isolate/quarantine is over.  Work: none; work note will be written as needed by office-patient to request date  School: NA; video if an option     ER if any worsening especially SOB; as soon as SOB  Health department will be notified if they are positive; they should hear from them at sometime  Contacts should be tested if/when patient test returns positive; due to volume best patient call friends, family, contacts if able (health department may be slow at doing this)     Test back: 12.16.20  Test result neg         Return to social distancing dates below  + test: 10 days from onset symptoms and no symptoms especially temp > 100.4 (pending)  - test:   14 days from firm exposure (pending)  10 days from firm exposure without a  test if no symptoms (pending); must agree to ADDITIONAL social distancing through day 14 (correct mask, social distance, hand washing, enviromental cleaning, avoid crowds-those increased risk, adequate indoor ventilation, report any symptoms) (pending)  7-8 days from firm exposure with testing on day 6 and that test coming back negative; must agree to ADDITIONAL social distancing above through day 14 (pending)     Do not rely on disease/antibodies for protection for infection future  Stay abreast for future recommendations for vaccination (even if had disease)     Has multiple chronic problems to consider that might have a bearing on today's issues; not an interval appointment.       ########################    Caller: My Chavez    Relationship: Self    Best call back number: [phone]     What medication are you requesting: ANYTHING FOR CHEST BURNS AND THROAT TIGHT    What are your current symptoms: CHEST BURNS AND THROAT TIGHT     How long have you been experiencing symptoms: MONDAY   Have you had these symptoms before:    [x] Yes  [] No    Have you been treated for these symptoms before:   [x] Yes  [] No    If a prescription is needed, what is your preferred pharmacy and phone number: [facility] - [place], [place] - [address] [place] [address] [phone] [facility] [phone] FX     Additional notes: PLEASE CALL AND ADVISE

## 2021-02-04 DIAGNOSIS — F98.8 ATTENTION DEFICIT DISORDER (ADD) WITHOUT HYPERACTIVITY: ICD-10-CM

## 2021-02-04 RX ORDER — METHYLPHENIDATE HYDROCHLORIDE 10 MG/1
TABLET ORAL
Qty: 60 TABLET | Refills: 0 | Status: SHIPPED | OUTPATIENT
Start: 2021-02-04 | End: 2021-03-11

## 2021-03-09 ENCOUNTER — OFFICE VISIT (OUTPATIENT)
Dept: OBSTETRICS AND GYNECOLOGY | Facility: CLINIC | Age: 26
End: 2021-03-09

## 2021-03-09 VITALS
BODY MASS INDEX: 37.67 KG/M2 | HEIGHT: 67 IN | SYSTOLIC BLOOD PRESSURE: 124 MMHG | DIASTOLIC BLOOD PRESSURE: 84 MMHG | WEIGHT: 240 LBS

## 2021-03-09 DIAGNOSIS — Z87.891 FORMER SMOKER: ICD-10-CM

## 2021-03-09 DIAGNOSIS — N93.8 DUB (DYSFUNCTIONAL UTERINE BLEEDING): ICD-10-CM

## 2021-03-09 DIAGNOSIS — E28.2 PCOS (POLYCYSTIC OVARIAN SYNDROME): ICD-10-CM

## 2021-03-09 PROCEDURE — 99213 OFFICE O/P EST LOW 20 MIN: CPT | Performed by: OBSTETRICS & GYNECOLOGY

## 2021-03-09 RX ORDER — ADAPALENE 3 MG/G
GEL TOPICAL
Status: ON HOLD | COMMUNITY
Start: 2021-01-21 | End: 2023-03-08

## 2021-03-09 RX ORDER — CLINDAMYCIN PHOSPHATE 10 UG/ML
LOTION TOPICAL
COMMUNITY
Start: 2021-01-21 | End: 2021-05-25

## 2021-03-09 RX ORDER — ETONOGESTREL AND ETHINYL ESTRADIOL 11.7; 2.7 MG/1; MG/1
1 INSERT, EXTENDED RELEASE VAGINAL
Qty: 1 EACH | Refills: 12 | Status: ON HOLD | OUTPATIENT
Start: 2021-03-09 | End: 2023-03-08

## 2021-03-09 NOTE — PROGRESS NOTES
"Subjective   Chief Complaint   Patient presents with   • Dysfunctional uterine bleeding     pt here today for follow up on DUB. pt voices that nuvaring has definitely lightened her periods. pt voices no other concerns.      My Chavez is a 25 y.o. year old No obstetric history on file..  Patient's last menstrual period was 2021 (exact date).  She presents to be seen for follow-up of DUB.  Patient started using NuvaRing in December.  She is using ring continuously - and although she is unable to completely skip her menses, she reports that flow is still much lighter and shorter then her previous bleeding pattern.  Patient is pleased and has no concerns.  She did pass a uterine cast in December, but googled it and was reassured that everything was fine.     The following portions of the patient's history were reviewed and updated as appropriate:current medications and allergies    Social History    Tobacco Use      Smoking status: Former Smoker        Packs/day: 0.10        Types: Cigarettes        Start date: 2014        Quit date: 10/27/2016        Years since quittin.3      Smokeless tobacco: Never Used    Review of Systems   Constitutional: Negative for activity change and unexpected weight change.   Respiratory: Negative for shortness of breath.    Cardiovascular: Negative for chest pain.   Gastrointestinal: Negative for abdominal pain.   Genitourinary: Negative for menstrual problem (periods now much better on continuous NuvaRing).         Objective   /84   Ht 170.2 cm (67\")   Wt 109 kg (240 lb)   LMP 2021 (Exact Date)   BMI 37.59 kg/m²     Physical Exam  Vitals and nursing note reviewed.   Constitutional:       General: She is not in acute distress.     Appearance: She is well-developed.   HENT:      Head: Normocephalic and atraumatic.   Neck:      Thyroid: No thyromegaly.   Pulmonary:      Effort: Pulmonary effort is normal.   Abdominal:      General: There is no distension.    "   Palpations: Abdomen is soft.      Tenderness: There is no abdominal tenderness.   Musculoskeletal:         General: Normal range of motion.      Cervical back: Normal range of motion.   Skin:     General: Skin is warm and dry.   Neurological:      Mental Status: She is alert and oriented to person, place, and time.   Psychiatric:         Behavior: Behavior normal.         Judgment: Judgment normal.         Lab Review   No data reviewed    Imaging   No data reviewed     Assessment & Plan    Diagnoses and all orders for this visit:    1. BMI 37.0-37.9, adult (Primary): obesity.  Patient is lost 25 pounds since October.  She is exercising regularly and reducing her calories.  Lots of praise was given for her efforts.  The patient is hoping to be under 200 pounds by the time she comes back to the office.    2. DUB (dysfunctional uterine bleeding): Patient pleased to be having lighter and shorter cycles.  She was hoping to achieve amenorrhea with continuous use of her NuvaRing but is still satisfied since there has been dramatic improvement  -     etonogestrel-ethinyl estradiol (NuvaRing) 0.12-0.015 MG/24HR vaginal ring; Insert 1 each into the vagina Every 28 (Twenty-Eight) Days. Insert vaginally and leave in place for 3 consecutive weeks, then remove for 1 week.  Dispense: 1 each; Refill: 12    3. Former smoker    4. PCOS (polycystic ovarian syndrome): Patient trying to lose weight and attempts to correct her PCOS    Patient has never had a Pap smear.  She has been sexually active in the past and declines STD testing today since she reports she has had no new partners recently.  The patient return to the office for her next follow-up in 6 months; that appointment will be scheduled as an annual exam and she will have her first Pap      This note was electronically signed.    Deborah Hays MD  March 9, 2021  11:07 CST    Total time spent today with My  was 20-29 minutes (level 3).  Greater than 50% of the time was  spent coordinating care, answering her questions and counseling regarding pathophysiology of her presenting problem along with plans for any diagnositc work-up and treatment.

## 2021-03-11 DIAGNOSIS — E66.01 CLASS 3 SEVERE OBESITY DUE TO EXCESS CALORIES WITHOUT SERIOUS COMORBIDITY WITH BODY MASS INDEX (BMI) OF 40.0 TO 44.9 IN ADULT (HCC): ICD-10-CM

## 2021-03-11 DIAGNOSIS — F98.8 ATTENTION DEFICIT DISORDER (ADD) WITHOUT HYPERACTIVITY: ICD-10-CM

## 2021-03-11 RX ORDER — PHENTERMINE HYDROCHLORIDE 37.5 MG/1
TABLET ORAL
Qty: 30 TABLET | OUTPATIENT
Start: 2021-03-11

## 2021-03-11 RX ORDER — METHYLPHENIDATE HYDROCHLORIDE 10 MG/1
TABLET ORAL
Qty: 60 TABLET | Refills: 0 | Status: SHIPPED | OUTPATIENT
Start: 2021-03-11 | End: 2021-04-19

## 2021-03-16 ENCOUNTER — TELEPHONE (OUTPATIENT)
Dept: OBSTETRICS AND GYNECOLOGY | Facility: CLINIC | Age: 26
End: 2021-03-16

## 2021-03-16 NOTE — TELEPHONE ENCOUNTER
Pt called wanting a refill on Phentermine, spoke with Dr. Hays and as it was not discussed at her previous appt pt would need f/u appt prior to calling in Phentermine, called to inform pt, no answer and voicemail full.

## 2021-03-17 ENCOUNTER — TELEPHONE (OUTPATIENT)
Dept: OBSTETRICS AND GYNECOLOGY | Facility: CLINIC | Age: 26
End: 2021-03-17

## 2021-03-17 NOTE — TELEPHONE ENCOUNTER
Pt has called again req Phentramine. I saw Josiane's message that she would need appt to discuss this. Pt was just here 3/9/21 and was still taking this med. She ran out on 3/8/21. She would like to continue this med. Please advise.

## 2021-03-19 NOTE — TELEPHONE ENCOUNTER
I generally only write this medication for three months, just to help patient's jump start their weight loss efforts.  I will not maintain it long-term.  She is exercising and eating healthier; weight loss will continue without medication.

## 2021-04-19 DIAGNOSIS — F98.8 ATTENTION DEFICIT DISORDER (ADD) WITHOUT HYPERACTIVITY: ICD-10-CM

## 2021-04-19 RX ORDER — METHYLPHENIDATE HYDROCHLORIDE 10 MG/1
TABLET ORAL
Qty: 60 TABLET | Refills: 0 | Status: SHIPPED | OUTPATIENT
Start: 2021-04-19 | End: 2021-05-21

## 2021-04-19 NOTE — TELEPHONE ENCOUNTER
Pt no showed last appt on 4-8-21, note attached that she must call and get rescheduled    [place]  Last refill 3-11-21

## 2021-05-20 DIAGNOSIS — F98.8 ATTENTION DEFICIT DISORDER (ADD) WITHOUT HYPERACTIVITY: ICD-10-CM

## 2021-05-21 RX ORDER — METHYLPHENIDATE HYDROCHLORIDE 10 MG/1
10 TABLET ORAL 2 TIMES DAILY
Qty: 60 TABLET | Refills: 0 | Status: SHIPPED | OUTPATIENT
Start: 2021-05-21 | End: 2021-05-25

## 2021-05-25 ENCOUNTER — OFFICE VISIT (OUTPATIENT)
Dept: FAMILY MEDICINE CLINIC | Facility: CLINIC | Age: 26
End: 2021-05-25

## 2021-05-25 VITALS
HEART RATE: 91 BPM | RESPIRATION RATE: 16 BRPM | BODY MASS INDEX: 37.83 KG/M2 | OXYGEN SATURATION: 99 % | SYSTOLIC BLOOD PRESSURE: 124 MMHG | TEMPERATURE: 97.3 F | WEIGHT: 241 LBS | HEIGHT: 67 IN | DIASTOLIC BLOOD PRESSURE: 78 MMHG

## 2021-05-25 DIAGNOSIS — F41.9 ANXIETY: ICD-10-CM

## 2021-05-25 DIAGNOSIS — F98.8 ATTENTION DEFICIT DISORDER (ADD) WITHOUT HYPERACTIVITY: Primary | ICD-10-CM

## 2021-05-25 DIAGNOSIS — F41.8 ANXIETY WITH DEPRESSION: ICD-10-CM

## 2021-05-25 PROCEDURE — 99214 OFFICE O/P EST MOD 30 MIN: CPT | Performed by: NURSE PRACTITIONER

## 2021-05-25 RX ORDER — ALPRAZOLAM 0.5 MG/1
0.5 TABLET ORAL DAILY PRN
Qty: 30 TABLET | Refills: 0 | Status: SHIPPED | OUTPATIENT
Start: 2021-05-25 | End: 2021-09-21

## 2021-05-25 RX ORDER — CITALOPRAM 20 MG/1
20 TABLET ORAL DAILY
Qty: 90 TABLET | Refills: 3 | Status: SHIPPED | OUTPATIENT
Start: 2021-05-25 | End: 2021-11-18

## 2021-05-25 RX ORDER — METHYLPHENIDATE HYDROCHLORIDE 27 MG/1
27 TABLET ORAL EVERY MORNING
Qty: 30 TABLET | Refills: 0 | Status: SHIPPED | OUTPATIENT
Start: 2021-05-25 | End: 2021-06-24

## 2021-05-25 NOTE — PROGRESS NOTES
Subjective   Chief Complaint:  Anxiety, attention deficit    History of Present Illness:  This 25 y.o. female was seen in the office today.  She has attention deficit, she started Ritalin 10 mg p.o. twice a day when she started college.  She is out the work tomorrow now.  Reports that the medicine has really helped her as an adult.  She reports it does wear off after taking it after a few hours.  She has been on this dosage for over 6 months.  She has anxiety with depression, reports Celexa works good and takes an occasional Xanax.    Allergies   Allergen Reactions   • Contrave [Naltrexone-Bupropion Hcl Er] Nausea Only     Nausea        Current Outpatient Medications on File Prior to Visit   Medication Sig   • acetaminophen (TYLENOL) 325 MG tablet Take 650 mg by mouth Every 6 (Six) Hours As Needed for Mild Pain  (Takes 2 tablets as needed.).   • adapalene (DIFFERIN) 0.3 % gel    • etonogestrel-ethinyl estradiol (NuvaRing) 0.12-0.015 MG/24HR vaginal ring Insert 1 each into the vagina Every 28 (Twenty-Eight) Days. Insert vaginally and leave in place for 3 consecutive weeks, then remove for 1 week.   • [DISCONTINUED] ALPRAZolam (XANAX) 0.5 MG tablet TAKE 1/2 TABLET THREE TIMESDAILY AS NEEDED FOR ANXIETY   • [DISCONTINUED] citalopram (CeleXA) 20 MG tablet TAKE 1 TABLET DAILY   • [DISCONTINUED] methylphenidate (RITALIN) 10 MG tablet Take 1 tablet by mouth 2 (Two) Times a Day. Please keep appt on 5-25-21, thank you   • [DISCONTINUED] clindamycin (CLEOCIN T) 1 % lotion    • [DISCONTINUED] pantoprazole (PROTONIX) 40 MG EC tablet TAKE 1 TABLET DAILY   • [DISCONTINUED] phentermine 37.5 MG capsule Take 1 capsule by mouth Every Morning.     No current facility-administered medications on file prior to visit.      Past Medical, Surgical, Social, and Family History:  Past Medical History:   Diagnosis Date   • Abdominal pain    • Acid reflux    • Anemia    • Anxiety    • Asthma     POLLEN CAN CAUSE SYMPTOMS NO ASTHMA FOR 8 YEARS    • Depression    • Diarrhea    • Gallbladder abscess    • GERD (gastroesophageal reflux disease)    • Nausea and/or vomiting    • Seasonal allergies      Past Surgical History:   Procedure Laterality Date   • ADENOIDECTOMY     • CHOLECYSTECTOMY  2018   • CHOLECYSTECTOMY WITH INTRAOPERATIVE CHOLANGIOGRAM N/A 2018    Procedure: LAPAROSCOPIC CHOLECYSTECTOMY  WITH CHOLANGIOGRAM;  Surgeon: Martita Gunter MD;  Location: [facility] OR;  Service: General   • COLONOSCOPY N/A 10/2/2017    Procedure: COLONOSCOPY WITH ANESTHESIA;  Surgeon: Manoj Madden DO;  Location: [facility] ENDOSCOPY;  Service:    • ENDOSCOPY N/A 10/2/2017    Procedure: ESOPHAGOGASTRODUODENOSCOPY WITH ANESTHESIA;  Surgeon: Manoj Madden DO;  Location: [facility] ENDOSCOPY;  Service:    • LEG SURGERY     • TONSILLECTOMY       Social History     Socioeconomic History   • Marital status: Single     Spouse name: Not on file   • Number of children: 0   • Years of education: 14   • Highest education level: Not on file   Tobacco Use   • Smoking status: Former Smoker     Packs/day: 0.10     Types: Cigarettes     Start date: 2014     Quit date: 10/27/2016     Years since quittin.5   • Smokeless tobacco: Never Used   Substance and Sexual Activity   • Alcohol use: Yes     Alcohol/week: 1.0 standard drinks     Types: 1 Glasses of wine per week     Comment: occ    • Drug use: No   • Sexual activity: Yes     Partners: Male     Birth control/protection: Condom     Family History   Problem Relation Age of Onset   • Hypertension Mother    • Hypertension Father    • No Known Problems Brother    • Breast cancer Paternal Grandmother    • Colon cancer Neg Hx    • Esophageal cancer Neg Hx      Objective   Physical Exam  Vitals and nursing note reviewed.   Constitutional:       General: She is not in acute distress.     Appearance: She is not diaphoretic.   HENT:      Head: Normocephalic and atraumatic.      Nose: Nose normal.   Eyes:      Conjunctiva/sclera:  "Conjunctivae normal.      Pupils: Pupils are equal, round, and reactive to light.   Neck:      Thyroid: No thyromegaly.      Vascular: No JVD.      Trachea: No tracheal deviation.   Cardiovascular:      Rate and Rhythm: Normal rate and regular rhythm.      Heart sounds: Normal heart sounds. No murmur heard.   No friction rub. No gallop.    Pulmonary:      Effort: Pulmonary effort is normal. No respiratory distress.      Breath sounds: Normal breath sounds. No wheezing.   Abdominal:      General: Bowel sounds are normal.      Palpations: Abdomen is soft.      Tenderness: There is no abdominal tenderness. There is no guarding.   Musculoskeletal:         General: No tenderness or deformity. Normal range of motion.      Cervical back: Normal range of motion and neck supple.   Lymphadenopathy:      Cervical: No cervical adenopathy.   Skin:     General: Skin is warm and dry.      Capillary Refill: Capillary refill takes less than 2 seconds.   Neurological:      Mental Status: She is alert and oriented to person, place, and time.   Psychiatric:         Behavior: Behavior normal.         Thought Content: Thought content normal.         Judgment: Judgment normal.     /78 (BP Location: Left arm)   Pulse 91   Temp 97.3 °F (36.3 °C)   Resp 16   Ht 170.2 cm (67\")   Wt 109 kg (241 lb)   SpO2 99%   BMI 37.75 kg/m²     Assessment/Plan   Diagnoses and all orders for this visit:    1. Attention deficit disorder (ADD) without hyperactivity (Primary)  -     methylphenidate (Concerta) 27 MG CR tablet; Take 1 tablet by mouth Every Morning  Dispense: 30 tablet; Refill: 0    2. Anxiety  -     ALPRAZolam (XANAX) 0.5 MG tablet; Take 1 tablet by mouth Daily As Needed for Anxiety.  Dispense: 30 tablet; Refill: 0    3. Anxiety with depression  -     citalopram (CeleXA) 20 MG tablet; Take 1 tablet by mouth Daily.  Dispense: 90 tablet; Refill: 3    Discussion:  Advised and educated plan of care.  Would like to switch her to a longer " acting formulation of the methylphenidate, titrate dosage.  See back in 6 months.    Follow-up:  Return for 6 Months - DEREK Purdy.    Electronically signed by DEREK Durant, 05/25/21, 2:12 PM CDT.

## 2021-06-22 DIAGNOSIS — F98.8 ATTENTION DEFICIT DISORDER (ADD) WITHOUT HYPERACTIVITY: ICD-10-CM

## 2021-06-22 RX ORDER — METHYLPHENIDATE HYDROCHLORIDE 10 MG/1
10 TABLET ORAL 2 TIMES DAILY
Qty: 60 TABLET | Refills: 0 | Status: SHIPPED | OUTPATIENT
Start: 2021-06-22 | End: 2021-06-24 | Stop reason: ALTCHOICE

## 2021-06-22 NOTE — TELEPHONE ENCOUNTER
Requested Prescriptions     Pending Prescriptions Disp Refills   • methylphenidate (RITALIN) 10 MG tablet [Pharmacy Med Name: METHYLPHENIDATE 10 MG TABLET] 60 tablet 0     Sig: Take 1 tablet by mouth 2 (Two) Times a Day.

## 2021-06-24 DIAGNOSIS — F98.8 ATTENTION DEFICIT DISORDER (ADD) WITHOUT HYPERACTIVITY: ICD-10-CM

## 2021-06-24 RX ORDER — METHYLPHENIDATE HYDROCHLORIDE 27 MG/1
TABLET ORAL
Qty: 30 TABLET | Refills: 0 | Status: SHIPPED | OUTPATIENT
Start: 2021-06-24 | End: 2021-07-29

## 2021-06-24 NOTE — TELEPHONE ENCOUNTER
Caller: [facility] [place] [address] [place] [address] [phone] [place] [phone] FX    Relationship: [facility]     Best call back number: [phone]    Medication needed: methylphenidate (Concerta)      When do you need the refill by: SOON     What additional details did the patient provide when requesting the medication:STATES MEDICATION WAS CHANGED AND WILL NEED A NEW SCRIPT FOR METHYLPHENIDATE XR     Does the patient have less than a 3 day supply:  [x] Yes  [] No    What is the patient's preferred pharmacy: [facility] - [place] [address] [phone] [facility] [phone] FX

## 2021-06-25 ENCOUNTER — OFFICE VISIT (OUTPATIENT)
Dept: FAMILY MEDICINE CLINIC | Facility: CLINIC | Age: 26
End: 2021-06-25

## 2021-06-25 DIAGNOSIS — J01.90 ACUTE NON-RECURRENT SINUSITIS, UNSPECIFIED LOCATION: Primary | ICD-10-CM

## 2021-06-25 DIAGNOSIS — R59.9 ADENOPATHY: ICD-10-CM

## 2021-06-25 PROCEDURE — 99213 OFFICE O/P EST LOW 20 MIN: CPT | Performed by: NURSE PRACTITIONER

## 2021-06-25 RX ORDER — AMOXICILLIN AND CLAVULANATE POTASSIUM 875; 125 MG/1; MG/1
1 TABLET, FILM COATED ORAL 2 TIMES DAILY
Qty: 20 TABLET | Refills: 0 | Status: SHIPPED | OUTPATIENT
Start: 2021-06-25 | End: 2021-06-28

## 2021-06-25 RX ORDER — METHYLPREDNISOLONE 4 MG/1
TABLET ORAL
Qty: 1 EACH | Refills: 0 | Status: SHIPPED | OUTPATIENT
Start: 2021-06-25 | End: 2021-06-28

## 2021-06-27 NOTE — PROGRESS NOTES
Subjective   Chief Complaint:  Earache left ear    History of Present Illness:  This 26 y.o. female was seen in the office today.  She presents today with an earache in the left ear cute onset this week, reports increased sinus drainage and pressure as well.    Allergies   Allergen Reactions   • Contrave [Naltrexone-Bupropion Hcl Er] Nausea Only     Nausea        Current Outpatient Medications on File Prior to Visit   Medication Sig   • acetaminophen (TYLENOL) 325 MG tablet Take 650 mg by mouth Every 6 (Six) Hours As Needed for Mild Pain  (Takes 2 tablets as needed.).   • adapalene (DIFFERIN) 0.3 % gel    • ALPRAZolam (XANAX) 0.5 MG tablet Take 1 tablet by mouth Daily As Needed for Anxiety.   • citalopram (CeleXA) 20 MG tablet Take 1 tablet by mouth Daily.   • etonogestrel-ethinyl estradiol (NuvaRing) 0.12-0.015 MG/24HR vaginal ring Insert 1 each into the vagina Every 28 (Twenty-Eight) Days. Insert vaginally and leave in place for 3 consecutive weeks, then remove for 1 week.   • methylphenidate 27 MG CR tablet TAKE ONE TABLET EVERY MORNING     No current facility-administered medications on file prior to visit.      Past Medical, Surgical, Social, and Family History:  Past Medical History:   Diagnosis Date   • Abdominal pain    • Acid reflux    • Anemia    • Anxiety    • Asthma     POLLEN CAN CAUSE SYMPTOMS NO ASTHMA FOR 8 YEARS   • Depression    • Diarrhea    • Gallbladder abscess    • GERD (gastroesophageal reflux disease)    • Nausea and/or vomiting    • Seasonal allergies      Past Surgical History:   Procedure Laterality Date   • ADENOIDECTOMY     • CHOLECYSTECTOMY  04/13/2018   • CHOLECYSTECTOMY WITH INTRAOPERATIVE CHOLANGIOGRAM N/A 4/13/2018    Procedure: LAPAROSCOPIC CHOLECYSTECTOMY  WITH CHOLANGIOGRAM;  Surgeon: Martita Gunter MD;  Location: [facility] OR;  Service: General   • COLONOSCOPY N/A 10/2/2017    Procedure: COLONOSCOPY WITH ANESTHESIA;  Surgeon: Manoj Madden DO;  Location: [facility] ENDOSCOPY;   Service:    • ENDOSCOPY N/A 10/2/2017    Procedure: ESOPHAGOGASTRODUODENOSCOPY WITH ANESTHESIA;  Surgeon: Manoj Madden DO;  Location: [facility] ENDOSCOPY;  Service:    • LEG SURGERY     • TONSILLECTOMY       Social History     Socioeconomic History   • Marital status: Single     Spouse name: Not on file   • Number of children: 0   • Years of education: 14   • Highest education level: Not on file   Tobacco Use   • Smoking status: Former Smoker     Packs/day: 0.10     Types: Cigarettes     Start date: 2014     Quit date: 10/27/2016     Years since quittin.6   • Smokeless tobacco: Never Used   Substance and Sexual Activity   • Alcohol use: Yes     Alcohol/week: 1.0 standard drinks     Types: 1 Glasses of wine per week     Comment: occ    • Drug use: No   • Sexual activity: Yes     Partners: Male     Birth control/protection: Condom     Family History   Problem Relation Age of Onset   • Hypertension Mother    • Hypertension Father    • No Known Problems Brother    • Breast cancer Paternal Grandmother    • Colon cancer Neg Hx    • Esophageal cancer Neg Hx      Objective   Physical Exam  Vitals reviewed.   Constitutional:       General: She is not in acute distress.     Appearance: Normal appearance.   HENT:      Right Ear: Hearing and tympanic membrane normal.      Left Ear: Hearing and tympanic membrane normal.      Nose:      Right Sinus: Maxillary sinus tenderness and frontal sinus tenderness present.      Left Sinus: Maxillary sinus tenderness and frontal sinus tenderness present.   Cardiovascular:      Rate and Rhythm: Normal rate and regular rhythm.   Pulmonary:      Effort: Pulmonary effort is normal.      Breath sounds: Normal breath sounds.   Lymphadenopathy:      Cervical: Cervical adenopathy present.      Right cervical: Superficial cervical adenopathy present.     There were no vitals taken for this visit.    Assessment/Plan   Diagnoses and all orders for this visit:    1. Acute non-recurrent  sinusitis, unspecified location (Primary)  -     amoxicillin-clavulanate (Augmentin) 875-125 MG per tablet; Take 1 tablet by mouth 2 (Two) Times a Day for 10 days.  Dispense: 20 tablet; Refill: 0  -     methylPREDNISolone (MEDROL) 4 MG dose pack; Take as directed on package instructions.  Dispense: 1 each; Refill: 0    2. Adenopathy    Discussion:  Advised and educated plan of care.  Advised patient the ear itself does not appear to be infected, this is likely referred pain from the sinus pressure.  Will treat with antibiotics and steroids.    Follow-up:  Return if symptoms worsen or fail to improve.    Electronically signed by DEREK Durant, 06/27/21, 11:57 AM CDT.

## 2021-06-28 VITALS
RESPIRATION RATE: 16 BRPM | WEIGHT: 262 LBS | OXYGEN SATURATION: 99 % | SYSTOLIC BLOOD PRESSURE: 126 MMHG | DIASTOLIC BLOOD PRESSURE: 84 MMHG | BODY MASS INDEX: 41.12 KG/M2 | TEMPERATURE: 97.9 F | HEART RATE: 106 BPM | HEIGHT: 67 IN

## 2021-07-06 ENCOUNTER — TELEPHONE (OUTPATIENT)
Dept: FAMILY MEDICINE CLINIC | Facility: CLINIC | Age: 26
End: 2021-07-06

## 2021-07-06 ENCOUNTER — OFFICE VISIT (OUTPATIENT)
Dept: FAMILY MEDICINE CLINIC | Facility: CLINIC | Age: 26
End: 2021-07-06

## 2021-07-06 VITALS
HEIGHT: 67 IN | SYSTOLIC BLOOD PRESSURE: 124 MMHG | WEIGHT: 242 LBS | BODY MASS INDEX: 37.98 KG/M2 | TEMPERATURE: 97.3 F | OXYGEN SATURATION: 99 % | DIASTOLIC BLOOD PRESSURE: 80 MMHG | RESPIRATION RATE: 16 BRPM | HEART RATE: 105 BPM

## 2021-07-06 DIAGNOSIS — H65.93 FLUID LEVEL BEHIND TYMPANIC MEMBRANE OF BOTH EARS: Primary | ICD-10-CM

## 2021-07-06 PROCEDURE — 99213 OFFICE O/P EST LOW 20 MIN: CPT | Performed by: NURSE PRACTITIONER

## 2021-07-06 RX ORDER — CETIRIZINE HYDROCHLORIDE, PSEUDOEPHEDRINE HYDROCHLORIDE 5; 120 MG/1; MG/1
1 TABLET, FILM COATED, EXTENDED RELEASE ORAL 2 TIMES DAILY
Qty: 28 TABLET | Refills: 0 | Status: SHIPPED | OUTPATIENT
Start: 2021-07-06

## 2021-07-06 NOTE — TELEPHONE ENCOUNTER
Would like to see her back to exam ear.    Electronically signed by DEREK Durant, 07/06/21, 8:18 AM CDT.

## 2021-07-06 NOTE — TELEPHONE ENCOUNTER
Caller: KathyMy    Relationship: Self    Best call back number:[phone]    What medication are you requesting: ANOTHER ANTIBIOTIC     What are your current symptoms: EAR ACHE     How long have you been experiencing symptoms: ABOUT 2 WEEKS NOW PATIENT STATES ONE EAR IS FEELING BETTER BUT THE OTHER IS NOW HURTING HER NOW    Have you had these symptoms before:    [x] Yes  [] No    Have you been treated for these symptoms before:   [x] Yes  [] No    If a prescription is needed, what is your preferred pharmacy and phone number: [facility] - [place] - [address] [place] [address] [phone] [facility] [phone]

## 2021-07-06 NOTE — TELEPHONE ENCOUNTER
HUB TO READ    Attempted to reach patient. Unable to reach or leave vm. Patient need an appointment scheduled with Rod.

## 2021-07-08 NOTE — PROGRESS NOTES
Subjective   Chief Complaint:  Ear discomfort    History of Present Illness:  This 26 y.o. female was seen in the office today.  She has finished a full round of amoxicillin clavulanate and a Medrol Dosepak prescribed on 6/25/2021.  Reports persistent ear discomfort.    Allergies   Allergen Reactions   • Contrave [Naltrexone-Bupropion Hcl Er] Nausea Only     Nausea        Current Outpatient Medications on File Prior to Visit   Medication Sig   • acetaminophen (TYLENOL) 325 MG tablet Take 650 mg by mouth Every 6 (Six) Hours As Needed for Mild Pain  (Takes 2 tablets as needed.).   • adapalene (DIFFERIN) 0.3 % gel    • ALPRAZolam (XANAX) 0.5 MG tablet Take 1 tablet by mouth Daily As Needed for Anxiety.   • citalopram (CeleXA) 20 MG tablet Take 1 tablet by mouth Daily.   • etonogestrel-ethinyl estradiol (NuvaRing) 0.12-0.015 MG/24HR vaginal ring Insert 1 each into the vagina Every 28 (Twenty-Eight) Days. Insert vaginally and leave in place for 3 consecutive weeks, then remove for 1 week.   • methylphenidate 27 MG CR tablet TAKE ONE TABLET EVERY MORNING     No current facility-administered medications on file prior to visit.      Past Medical, Surgical, Social, and Family History:  Past Medical History:   Diagnosis Date   • Abdominal pain    • Acid reflux    • Anemia    • Anxiety    • Asthma     POLLEN CAN CAUSE SYMPTOMS NO ASTHMA FOR 8 YEARS   • Depression    • Diarrhea    • Gallbladder abscess    • GERD (gastroesophageal reflux disease)    • Nausea and/or vomiting    • Seasonal allergies      Past Surgical History:   Procedure Laterality Date   • ADENOIDECTOMY     • CHOLECYSTECTOMY  04/13/2018   • CHOLECYSTECTOMY WITH INTRAOPERATIVE CHOLANGIOGRAM N/A 4/13/2018    Procedure: LAPAROSCOPIC CHOLECYSTECTOMY  WITH CHOLANGIOGRAM;  Surgeon: Martita Gunter MD;  Location: [facility] OR;  Service: General   • COLONOSCOPY N/A 10/2/2017    Procedure: COLONOSCOPY WITH ANESTHESIA;  Surgeon: Manoj Madden DO;  Location: [facility]  "ENDOSCOPY;  Service:    • ENDOSCOPY N/A 10/2/2017    Procedure: ESOPHAGOGASTRODUODENOSCOPY WITH ANESTHESIA;  Surgeon: Manoj Madden DO;  Location: [facility] ENDOSCOPY;  Service:    • LEG SURGERY     • TONSILLECTOMY       Social History     Socioeconomic History   • Marital status: Single     Spouse name: Not on file   • Number of children: 0   • Years of education: 14   • Highest education level: Not on file   Tobacco Use   • Smoking status: Former Smoker     Packs/day: 0.10     Types: Cigarettes     Start date: 2014     Quit date: 10/27/2016     Years since quittin.6   • Smokeless tobacco: Never Used   Substance and Sexual Activity   • Alcohol use: Yes     Alcohol/week: 1.0 standard drinks     Types: 1 Glasses of wine per week     Comment: occ    • Drug use: No   • Sexual activity: Yes     Partners: Male     Birth control/protection: Condom     Family History   Problem Relation Age of Onset   • Hypertension Mother    • Hypertension Father    • No Known Problems Brother    • Breast cancer Paternal Grandmother    • Colon cancer Neg Hx    • Esophageal cancer Neg Hx      Objective   Physical Exam  HENT:      Right Ear: A middle ear effusion is present. Tympanic membrane is not erythematous.      Left Ear: A middle ear effusion is present. Tympanic membrane is not erythematous.     /80 (BP Location: Left arm)   Pulse 105   Temp 97.3 °F (36.3 °C)   Resp 16   Ht 170.2 cm (67\")   Wt 110 kg (242 lb)   SpO2 99%   BMI 37.90 kg/m²     Assessment/Plan   Diagnoses and all orders for this visit:    1. Fluid level behind tympanic membrane of both ears (Primary)  -     cetirizine-pseudoephedrine (ZyrTEC-D) 5-120 MG per 12 hr tablet; Take 1 tablet by mouth 2 (Two) Times a Day.  Dispense: 28 tablet; Refill: 0    Discussion:  Advised and educated plan of care.  There is a residual ear effusion, will going to have her take 2 weeks of Zyrtec with a decongestant.  We discussed other options including a nasal " steroid which can show some benefit.  If this is ineffective, she will likely need ENT referral.    Follow-up:  Return if symptoms worsen or fail to improve.    Electronically signed by DEREK Durant, 07/08/21, 7:58 AM CDT.

## 2021-07-29 DIAGNOSIS — F98.8 ATTENTION DEFICIT DISORDER (ADD) WITHOUT HYPERACTIVITY: ICD-10-CM

## 2021-07-29 RX ORDER — METHYLPHENIDATE HYDROCHLORIDE 27 MG/1
TABLET ORAL
Qty: 30 TABLET | Refills: 0 | Status: SHIPPED | OUTPATIENT
Start: 2021-07-29 | End: 2021-09-03

## 2021-08-18 PROCEDURE — 87591 N.GONORRHOEAE DNA AMP PROB: CPT | Performed by: NURSE PRACTITIONER

## 2021-08-18 PROCEDURE — 87086 URINE CULTURE/COLONY COUNT: CPT | Performed by: NURSE PRACTITIONER

## 2021-08-18 PROCEDURE — 87491 CHLMYD TRACH DNA AMP PROBE: CPT | Performed by: NURSE PRACTITIONER

## 2021-08-18 PROCEDURE — 87661 TRICHOMONAS VAGINALIS AMPLIF: CPT | Performed by: NURSE PRACTITIONER

## 2021-08-18 PROCEDURE — 87186 SC STD MICRODIL/AGAR DIL: CPT | Performed by: NURSE PRACTITIONER

## 2021-08-18 PROCEDURE — 87077 CULTURE AEROBIC IDENTIFY: CPT | Performed by: NURSE PRACTITIONER

## 2021-08-20 ENCOUNTER — TELEPHONE (OUTPATIENT)
Dept: OBSTETRICS AND GYNECOLOGY | Facility: CLINIC | Age: 26
End: 2021-08-20

## 2021-09-03 DIAGNOSIS — F98.8 ATTENTION DEFICIT DISORDER (ADD) WITHOUT HYPERACTIVITY: ICD-10-CM

## 2021-09-03 RX ORDER — METHYLPHENIDATE HYDROCHLORIDE 27 MG/1
TABLET ORAL
Qty: 30 TABLET | Refills: 0 | Status: SHIPPED | OUTPATIENT
Start: 2021-09-03 | End: 2021-10-04

## 2021-09-21 DIAGNOSIS — F41.9 ANXIETY: ICD-10-CM

## 2021-09-21 RX ORDER — ALPRAZOLAM 0.5 MG/1
TABLET ORAL
Qty: 30 TABLET | Refills: 0 | Status: SHIPPED | OUTPATIENT
Start: 2021-09-21 | End: 2023-03-06

## 2021-10-04 DIAGNOSIS — F98.8 ATTENTION DEFICIT DISORDER (ADD) WITHOUT HYPERACTIVITY: ICD-10-CM

## 2021-10-04 RX ORDER — METHYLPHENIDATE HYDROCHLORIDE 27 MG/1
TABLET ORAL
Qty: 30 TABLET | Refills: 0 | Status: SHIPPED | OUTPATIENT
Start: 2021-10-04 | End: 2021-11-02

## 2021-10-12 ENCOUNTER — OFFICE VISIT (OUTPATIENT)
Dept: FAMILY MEDICINE CLINIC | Facility: CLINIC | Age: 26
End: 2021-10-12

## 2021-10-12 VITALS
HEIGHT: 67 IN | BODY MASS INDEX: 43.16 KG/M2 | TEMPERATURE: 98.6 F | RESPIRATION RATE: 16 BRPM | WEIGHT: 275 LBS | HEART RATE: 86 BPM | OXYGEN SATURATION: 99 %

## 2021-10-12 DIAGNOSIS — J32.9 SINUSITIS, UNSPECIFIED CHRONICITY, UNSPECIFIED LOCATION: ICD-10-CM

## 2021-10-12 DIAGNOSIS — J40 BRONCHITIS: Primary | ICD-10-CM

## 2021-10-12 PROCEDURE — 99213 OFFICE O/P EST LOW 20 MIN: CPT | Performed by: FAMILY MEDICINE

## 2021-10-12 RX ORDER — ALBUTEROL SULFATE 90 UG/1
2 AEROSOL, METERED RESPIRATORY (INHALATION) EVERY 4 HOURS PRN
Qty: 8 G | Refills: 0 | Status: SHIPPED | OUTPATIENT
Start: 2021-10-12

## 2021-10-12 RX ORDER — CEFDINIR 300 MG/1
300 CAPSULE ORAL 2 TIMES DAILY
Qty: 20 CAPSULE | Refills: 0 | Status: ON HOLD | OUTPATIENT
Start: 2021-10-12 | End: 2023-03-08

## 2021-10-12 NOTE — PROGRESS NOTES
Subjective   My Chavez is a 26 y.o. female presenting with chief complaint of:   Chief Complaint   Patient presents with   • Sinusitis   • URI   • Cough       History of Present Illness :  Alone.  Here for primarily an acute issue today; 1 week of significant sinus and nasal congestion associated with productive cough occasionally even bloody.  Has been no fever.  She has not had Covid vaccine works in healthcare as a lab tech..       Has multiple chronic problems to consider that might have a bearing on today's issues; not an interval appointment.       Chronic/acute problems reviewed today:   1. Bronchitis    2. Sinusitis, unspecified chronicity, unspecified location      Has an/another acute issue today: none.    The following portions of the patient's history were reviewed and updated as appropriate: allergies, current medications, past family history, past medical history, past social history, past surgical history and problem list.      Current Outpatient Medications:   •  acetaminophen (TYLENOL) 325 MG tablet, Take 650 mg by mouth Every 6 (Six) Hours As Needed for Mild Pain  (Takes 2 tablets as needed.)., Disp: , Rfl:   •  adapalene (DIFFERIN) 0.3 % gel, , Disp: , Rfl:   •  ALPRAZolam (XANAX) 0.5 MG tablet, TAKE ONE TABLET DAILY AS NEEDED FOR ANXIETY , Disp: 30 tablet, Rfl: 0  •  cetirizine-pseudoephedrine (ZyrTEC-D) 5-120 MG per 12 hr tablet, Take 1 tablet by mouth 2 (Two) Times a Day., Disp: 28 tablet, Rfl: 0  •  citalopram (CeleXA) 20 MG tablet, Take 1 tablet by mouth Daily., Disp: 90 tablet, Rfl: 3  •  etonogestrel-ethinyl estradiol (NuvaRing) 0.12-0.015 MG/24HR vaginal ring, Insert 1 each into the vagina Every 28 (Twenty-Eight) Days. Insert vaginally and leave in place for 3 consecutive weeks, then remove for 1 week., Disp: 1 each, Rfl: 12  •  methylphenidate 27 MG CR tablet, TAKE ONE TABLET EVERY MORNING , Disp: 30 tablet, Rfl: 0    No problems with medications.  Refills if needed done    Allergies    Allergen Reactions   • Contrave [Naltrexone-Bupropion Hcl Er] Nausea Only     Nausea         Review of Systems   Constitutional: Positive for activity change, appetite change, fatigue and fever.   HENT: Positive for congestion, rhinorrhea and sinus pressure. Negative for sore throat and trouble swallowing.    Eyes: Negative for visual disturbance.   Respiratory: Positive for cough. Negative for shortness of breath and wheezing.    Cardiovascular: Negative for chest pain, palpitations and leg swelling.   Gastrointestinal: Negative for abdominal pain, diarrhea, nausea and vomiting.   Genitourinary: Negative for difficulty urinating and dysuria.   Skin: Negative for rash.   Neurological: Positive for weakness and headache.       Lab Results:  Results for orders placed or performed during the hospital encounter of 08/18/21   Chlamydia trachomatis, Neisseria gonorrhoeae, PCR - Urine, Urine, Clean Catch    Specimen: Urine, Clean Catch   Result Value Ref Range    Chlamydia DNA by PCR Not Detected Not Detected    Neisseria gonorrhoeae by PCR Not Detected Not Detected   Trichomonas vaginalis, PCR - Urine, Urine, Clean Catch    Specimen: Urine, Clean Catch   Result Value Ref Range    Trichomonas vaginalis PCR Not Detected Not Detected   Urine Culture - Urine, Urine, Clean Catch    Specimen: Urine, Clean Catch   Result Value Ref Range    Urine Culture >100,000 CFU/mL Escherichia coli (A)        Susceptibility    Escherichia coli - LIDIA     Ampicillin <=2 Susceptible ug/ml     Ampicillin + Sulbactam <=2 Susceptible ug/ml     Cefazolin <=4 Susceptible ug/ml     Cefepime <=1 Susceptible ug/ml     Ceftazidime <=1 Susceptible ug/ml     Ceftriaxone <=1 Susceptible ug/ml     Gentamicin <=1 Susceptible ug/ml     Levofloxacin <=0.12 Susceptible ug/ml     Nitrofurantoin <=16 Susceptible ug/ml     Piperacillin + Tazobactam <=4 Susceptible ug/ml     Tetracycline <=1 Susceptible ug/ml     Trimethoprim + Sulfamethoxazole <=20 Susceptible  "ug/ml   POC Urinalysis Dipstick, Multipro (Automated dipstick)    Specimen: Urine   Result Value Ref Range    Color Yellow Yellow, Straw, Dark Yellow, Deborah    Clarity, UA Cloudy (A) Clear    Glucose, UA Negative Negative, 1000 mg/dL (3+) mg/dL    Bilirubin Small (1+) (A) Negative    Ketones, UA 15 mg/dL (A) Negative    Specific Gravity  1.030 1.005 - 1.030    Blood, UA Large (A) Negative    pH, Urine 6.0 5.0 - 8.0    Protein,  mg/dL (A) Negative mg/dL    Urobilinogen, UA Normal Normal    Nitrite, UA Negative Negative    Leukocytes Trace (A) Negative       A1C:No results for input(s): HGBA1C in the last 80828 hours.  PSA:No results for input(s): PSA in the last 65510 hours.  CBC:  Lab Results - Last 18 Months   Lab Units 05/28/20  1902   WBC 10*3/mm3 10.19   HEMOGLOBIN g/dL 12.1   HEMATOCRIT % 36.9   PLATELETS 10*3/mm3 276      BMP/CMP:  Lab Results - Last 18 Months   Lab Units 05/28/20  1902   SODIUM mmol/L 139   POTASSIUM mmol/L 4.0   CHLORIDE mmol/L 103   CO2 mmol/L 20.0*   BUN mg/dL 10   CREATININE mg/dL 0.63   EGFR IF NONAFRICN AM mL/min/1.73 116   CALCIUM mg/dL 9.3     HEPATIC:  Lab Results - Last 18 Months   Lab Units 05/28/20  1902   ALT (SGPT) U/L 36*   AST (SGOT) U/L 20   ALK PHOS U/L 69     THYROID:No results for input(s): TSH, T3FREE, FTI in the last 67037 hours.    Invalid input(s): T4FREE, T3, T4, TEUP,  TOTALT4    Objective   Pulse 86   Temp 98.6 °F (37 °C)   Resp 16   Ht 170.2 cm (67\")   Wt 125 kg (275 lb)   SpO2 99%   Breastfeeding No   BMI 43.07 kg/m²   Body mass index is 43.07 kg/m².    Physical Exam  Vitals reviewed.   Constitutional:       General: She is not in acute distress.     Appearance: She is obese.   HENT:      Nose: Nose normal.      Mouth/Throat:      Mouth: Mucous membranes are moist.      Pharynx: Oropharynx is clear.   Eyes:      Extraocular Movements: Extraocular movements intact.      Conjunctiva/sclera: Conjunctivae normal.      Pupils: Pupils are equal, round, and " reactive to light.   Cardiovascular:      Rate and Rhythm: Normal rate and regular rhythm.      Heart sounds: Normal heart sounds.   Pulmonary:      Effort: Pulmonary effort is normal.      Breath sounds: Normal breath sounds.   Musculoskeletal:      Cervical back: Neck supple.   Neurological:      Mental Status: She is alert and oriented to person, place, and time.         Assessment/Plan     1. Bronchitis    2. Sinusitis, unspecified chronicity, unspecified location        Rx: reviewed/changes:  New Medications Ordered This Visit   Medications   • cefdinir (OMNICEF) 300 MG capsule     Sig: Take 1 capsule by mouth 2 (Two) Times a Day.     Dispense:  20 capsule     Refill:  0   • albuterol sulfate  (90 Base) MCG/ACT inhaler     Sig: Inhale 2 puffs Every 4 (Four) Hours As Needed for Wheezing.     Dispense:  8 g     Refill:  0       LAB/Testing/Referrals: reviewed/orders:   Today:   Orders Placed This Encounter   Procedures   • COVID-19,[facility],NP/OP Swab in Transport Media or ESwab 72 HR TAT - Swab, Nasopharynx     Chronic/recurrent labs above or change to:   Same    Discussions:   Agreed covid test [facility]  Rx below  No work today/tomorrow minimum    There are no Patient Instructions on file for this visit.    Follow up: Return for lab;, Dr Nuñez-, as planned;.  Future Appointments   Date Time Provider Department Center   11/16/2021  2:30 PM Mak Nuñez MD [facility]

## 2021-10-18 ENCOUNTER — OFFICE VISIT (OUTPATIENT)
Dept: FAMILY MEDICINE CLINIC | Facility: CLINIC | Age: 26
End: 2021-10-18

## 2021-10-18 VITALS
TEMPERATURE: 97.5 F | HEIGHT: 67 IN | WEIGHT: 274 LBS | BODY MASS INDEX: 43 KG/M2 | HEART RATE: 127 BPM | RESPIRATION RATE: 18 BRPM | OXYGEN SATURATION: 98 %

## 2021-10-18 DIAGNOSIS — J40 BRONCHITIS: Primary | ICD-10-CM

## 2021-10-18 PROCEDURE — 99213 OFFICE O/P EST LOW 20 MIN: CPT | Performed by: NURSE PRACTITIONER

## 2021-10-18 RX ORDER — AZITHROMYCIN 250 MG/1
TABLET, FILM COATED ORAL
Qty: 6 TABLET | Refills: 0 | Status: SHIPPED | OUTPATIENT
Start: 2021-10-18 | End: 2021-10-23

## 2021-10-18 RX ORDER — GUAIFENESIN AND CODEINE PHOSPHATE 100; 10 MG/5ML; MG/5ML
5 SOLUTION ORAL 3 TIMES DAILY PRN
Qty: 118 ML | Refills: 0 | Status: ON HOLD | OUTPATIENT
Start: 2021-10-18 | End: 2023-03-08

## 2021-10-18 RX ORDER — METHYLPREDNISOLONE 4 MG/1
TABLET ORAL
Qty: 1 EACH | Refills: 0 | Status: SHIPPED | OUTPATIENT
Start: 2021-10-18 | End: 2022-10-11

## 2021-10-18 NOTE — PROGRESS NOTES
Subjective   Chief Complaint:  Persistent productive cough    History of Present Illness:  This 26 y.o. female was seen in the office today. Persistent productive cough that started out with sinuses approximately a little over a week ago and has moved to chest.      Allergies   Allergen Reactions   • Contrave [Naltrexone-Bupropion Hcl Er] Nausea Only     Nausea        Current Outpatient Medications on File Prior to Visit   Medication Sig   • acetaminophen (TYLENOL) 325 MG tablet Take 650 mg by mouth Every 6 (Six) Hours As Needed for Mild Pain  (Takes 2 tablets as needed.).   • adapalene (DIFFERIN) 0.3 % gel    • albuterol sulfate  (90 Base) MCG/ACT inhaler Inhale 2 puffs Every 4 (Four) Hours As Needed for Wheezing.   • ALPRAZolam (XANAX) 0.5 MG tablet TAKE ONE TABLET DAILY AS NEEDED FOR ANXIETY    • cefdinir (OMNICEF) 300 MG capsule Take 1 capsule by mouth 2 (Two) Times a Day.   • cetirizine-pseudoephedrine (ZyrTEC-D) 5-120 MG per 12 hr tablet Take 1 tablet by mouth 2 (Two) Times a Day.   • citalopram (CeleXA) 20 MG tablet Take 1 tablet by mouth Daily.   • etonogestrel-ethinyl estradiol (NuvaRing) 0.12-0.015 MG/24HR vaginal ring Insert 1 each into the vagina Every 28 (Twenty-Eight) Days. Insert vaginally and leave in place for 3 consecutive weeks, then remove for 1 week.   • methylphenidate 27 MG CR tablet TAKE ONE TABLET EVERY MORNING      No current facility-administered medications on file prior to visit.      Past Medical, Surgical, Social, and Family History:  Past Medical History:   Diagnosis Date   • Abdominal pain    • Acid reflux    • Anemia    • Anxiety    • Asthma     POLLEN CAN CAUSE SYMPTOMS NO ASTHMA FOR 8 YEARS   • Depression    • Diarrhea    • Gallbladder abscess    • GERD (gastroesophageal reflux disease)    • Nausea and/or vomiting    • Seasonal allergies      Past Surgical History:   Procedure Laterality Date   • ADENOIDECTOMY     • CHOLECYSTECTOMY  04/13/2018   • CHOLECYSTECTOMY WITH  "INTRAOPERATIVE CHOLANGIOGRAM N/A 2018    Procedure: LAPAROSCOPIC CHOLECYSTECTOMY  WITH CHOLANGIOGRAM;  Surgeon: Martita Gunter MD;  Location: [facility] OR;  Service: General   • COLONOSCOPY N/A 10/2/2017    Procedure: COLONOSCOPY WITH ANESTHESIA;  Surgeon: Manoj Madden DO;  Location: [facility] ENDOSCOPY;  Service:    • ENDOSCOPY N/A 10/2/2017    Procedure: ESOPHAGOGASTRODUODENOSCOPY WITH ANESTHESIA;  Surgeon: Manoj Madden DO;  Location: [facility] ENDOSCOPY;  Service:    • LEG SURGERY     • TONSILLECTOMY       Social History     Socioeconomic History   • Marital status: Single   • Number of children: 0   • Years of education: 14   Tobacco Use   • Smoking status: Former Smoker     Packs/day: 0.10     Types: Cigarettes     Start date: 2014     Quit date: 10/27/2016     Years since quittin.9   • Smokeless tobacco: Never Used   Substance and Sexual Activity   • Alcohol use: Yes     Alcohol/week: 1.0 standard drink     Types: 1 Glasses of wine per week     Comment: occ    • Drug use: No   • Sexual activity: Yes     Partners: Male     Birth control/protection: Condom     Family History   Problem Relation Age of Onset   • Hypertension Mother    • Hypertension Father    • No Known Problems Brother    • Breast cancer Paternal Grandmother    • Colon cancer Neg Hx    • Esophageal cancer Neg Hx      Objective   Physical Exam  Vitals reviewed.   Constitutional:       General: She is not in acute distress.     Appearance: Normal appearance.   HENT:      Nose: Congestion and rhinorrhea present.   Cardiovascular:      Rate and Rhythm: Normal rate and regular rhythm.   Pulmonary:      Effort: Pulmonary effort is normal.      Breath sounds: Rhonchi present. No wheezing.     Pulse (!) 127   Temp 97.5 °F (36.4 °C) (Infrared)   Resp 18   Ht 170.2 cm (67\")   Wt 124 kg (274 lb)   SpO2 98%   BMI 42.91 kg/m²     Assessment/Plan   Diagnoses and all orders for this visit:    1. Bronchitis (Primary)  -     " methylPREDNISolone (MEDROL) 4 MG dose pack; Take as directed on package instructions.  Dispense: 1 each; Refill: 0  -     azithromycin (Zithromax Z-Jose) 250 MG tablet; Take 2 tablets the first day, then 1 tablet daily for 4 days.  Dispense: 6 tablet; Refill: 0  -     guaiFENesin-codeine (GUAIFENESIN AC) 100-10 MG/5ML liquid; Take 5 mL by mouth 3 (Three) Times a Day As Needed for Cough or Congestion.  Dispense: 118 mL; Refill: 0    Discussion:  Advised and educated plan of care. Patient has tested negative for Covid with a home test and also gets tested at work    Follow-up:  Return if symptoms worsen or fail to improve.    Electronically signed by DEREK Durant, 10/18/21, 11:28 AM CDT.

## 2021-11-01 DIAGNOSIS — F98.8 ATTENTION DEFICIT DISORDER (ADD) WITHOUT HYPERACTIVITY: ICD-10-CM

## 2021-11-02 RX ORDER — METHYLPHENIDATE HYDROCHLORIDE 27 MG/1
TABLET ORAL
Qty: 30 TABLET | Refills: 0 | Status: SHIPPED | OUTPATIENT
Start: 2021-11-02 | End: 2022-10-11 | Stop reason: SDUPTHER

## 2021-11-02 NOTE — TELEPHONE ENCOUNTER
Rx Refill Note  Requested Prescriptions     Pending Prescriptions Disp Refills   • methylphenidate 27 MG CR tablet [Pharmacy Med Name: METHYLPHENIDATE HYDROCHLORIDE ER 27MG ER TABLET ER] 30 tablet 0     Sig: TAKE ONE TABLET EVERY MORNING      Last office visit with prescribing clinician: 10/12/2021      Next office visit with prescribing clinician: 11/2/2021            Crystal Ramon MA  11/02/21, 10:06 CDT      Last filled 10/4/2021

## 2021-11-03 ENCOUNTER — OFFICE VISIT (OUTPATIENT)
Dept: FAMILY MEDICINE CLINIC | Facility: CLINIC | Age: 26
End: 2021-11-03

## 2021-11-03 VITALS
RESPIRATION RATE: 18 BRPM | TEMPERATURE: 97.7 F | WEIGHT: 277.4 LBS | SYSTOLIC BLOOD PRESSURE: 112 MMHG | HEIGHT: 67 IN | BODY MASS INDEX: 43.54 KG/M2 | HEART RATE: 114 BPM | DIASTOLIC BLOOD PRESSURE: 76 MMHG | OXYGEN SATURATION: 99 %

## 2021-11-03 DIAGNOSIS — J30.89 NON-SEASONAL ALLERGIC RHINITIS, UNSPECIFIED TRIGGER: Chronic | ICD-10-CM

## 2021-11-03 DIAGNOSIS — H69.82 DYSFUNCTION OF LEFT EUSTACHIAN TUBE: ICD-10-CM

## 2021-11-03 DIAGNOSIS — Z87.09 HISTORY OF ASTHMA: ICD-10-CM

## 2021-11-03 DIAGNOSIS — J40 BRONCHITIS: ICD-10-CM

## 2021-11-03 DIAGNOSIS — N76.0 ACUTE VAGINITIS: ICD-10-CM

## 2021-11-03 PROCEDURE — 99213 OFFICE O/P EST LOW 20 MIN: CPT | Performed by: FAMILY MEDICINE

## 2021-11-03 RX ORDER — FLUCONAZOLE 100 MG/1
100 TABLET ORAL DAILY
Qty: 3 TABLET | Refills: 0 | Status: ON HOLD | OUTPATIENT
Start: 2021-11-03 | End: 2023-03-08

## 2021-11-03 RX ORDER — CETIRIZINE HYDROCHLORIDE 10 MG/1
10 TABLET ORAL DAILY
COMMUNITY

## 2021-11-03 NOTE — PROGRESS NOTES
"Subjective   My Chavez is a 26 y.o. female presenting with chief complaint of:   Chief Complaint   Patient presents with   • Follow-up     \"i had RSV\"   • Earache     \"my left\"   • Vaginitis     \"i have been on so many antibotics, i need something for it\"       History of Present Illness :  Alone.  Here for primarily f/u recent URI.  Went to ER/RSV +.  Feeling better.  Even before L ear full/with decreased hearing.  Never had this much trouble with hearing/ears.  Improved nasal congestion; no cough.  Vaginal irritaiton post abx with discharge/white-cheezy.       Has few chronic problems to consider that might have a bearing on today's issues; not an interval appointment.       Chronic/acute problems reviewed today:   1. Dysfunction of left eustachian tube-see above    2. Non-seasonal allergic rhinitis, unspecified trigger chronic/variable nasal congestion.     3. Acute vaginitis acute/above   4. History of asthma : chronic/variable cough/wheeze   5. Bronchitis: acute/above     Has an/another acute issue today: none.    The following portions of the patient's history were reviewed and updated as appropriate: allergies, current medications, past family history, past medical history, past social history, past surgical history and problem list.      Current Outpatient Medications:   •  adapalene (DIFFERIN) 0.3 % gel, , Disp: , Rfl:   •  albuterol sulfate  (90 Base) MCG/ACT inhaler, Inhale 2 puffs Every 4 (Four) Hours As Needed for Wheezing., Disp: 8 g, Rfl: 0  •  ALPRAZolam (XANAX) 0.5 MG tablet, TAKE ONE TABLET DAILY AS NEEDED FOR ANXIETY , Disp: 30 tablet, Rfl: 0  •  cetirizine (zyrTEC) 10 MG tablet, Take 10 mg by mouth Daily., Disp: , Rfl:   •  citalopram (CeleXA) 20 MG tablet, Take 1 tablet by mouth Daily., Disp: 90 tablet, Rfl: 3  •  etonogestrel-ethinyl estradiol (NuvaRing) 0.12-0.015 MG/24HR vaginal ring, Insert 1 each into the vagina Every 28 (Twenty-Eight) Days. Insert vaginally and leave in place for " 3 consecutive weeks, then remove for 1 week., Disp: 1 each, Rfl: 12  •  methylphenidate 27 MG CR tablet, TAKE ONE TABLET EVERY MORNING, Disp: 30 tablet, Rfl: 0  •  acetaminophen (TYLENOL) 325 MG tablet, Take 650 mg by mouth Every 6 (Six) Hours As Needed for Mild Pain  (Takes 2 tablets as needed.)., Disp: , Rfl:   •  cetirizine-pseudoephedrine (ZyrTEC-D) 5-120 MG per 12 hr tablet, Take 1 tablet by mouth 2 (Two) Times a Day., Disp: 28 tablet, Rfl: 0  •  guaiFENesin-codeine (GUAIFENESIN AC) 100-10 MG/5ML liquid, Take 5 mL by mouth 3 (Three) Times a Day As Needed for Cough or Congestion., Disp: 118 mL, Rfl: 0      No problems with medications.    Allergies   Allergen Reactions   • Contrave [Naltrexone-Bupropion Hcl Er] Nausea Only     Nausea         Review of Systems   Constitutional: Positive for fatigue. Negative for activity change, appetite change and fever.   HENT: Positive for ear pain (L), hearing loss (L) and rhinorrhea. Negative for mouth sores, sore throat, trouble swallowing and voice change.    Eyes: Negative for visual disturbance.   Respiratory: Positive for cough. Negative for shortness of breath, wheezing and stridor.    Cardiovascular: Negative for chest pain, palpitations and leg swelling.   Gastrointestinal: Negative for abdominal pain, diarrhea, nausea and vomiting.   Genitourinary: Positive for vaginal discharge. Negative for difficulty urinating, dysuria, flank pain, genital sores and menstrual problem.   Neurological: Negative for weakness and headaches.     Data reviewed:   Recent admit/ER/MD visits: ER visit  Last cardiac testing:   Echo: none        Lab Results:  Results for orders placed or performed during the hospital encounter of 08/18/21   Chlamydia trachomatis, Neisseria gonorrhoeae, PCR - Urine, Urine, Clean Catch    Specimen: Urine, Clean Catch   Result Value Ref Range    Chlamydia DNA by PCR Not Detected Not Detected    Neisseria gonorrhoeae by PCR Not Detected Not Detected    Trichomonas vaginalis, PCR - Urine, Urine, Clean Catch    Specimen: Urine, Clean Catch   Result Value Ref Range    Trichomonas vaginalis PCR Not Detected Not Detected   Urine Culture - Urine, Urine, Clean Catch    Specimen: Urine, Clean Catch   Result Value Ref Range    Urine Culture >100,000 CFU/mL Escherichia coli (A)        Susceptibility    Escherichia coli - LIDIA     Ampicillin <=2 Susceptible ug/ml     Ampicillin + Sulbactam <=2 Susceptible ug/ml     Cefazolin <=4 Susceptible ug/ml     Cefepime <=1 Susceptible ug/ml     Ceftazidime <=1 Susceptible ug/ml     Ceftriaxone <=1 Susceptible ug/ml     Gentamicin <=1 Susceptible ug/ml     Levofloxacin <=0.12 Susceptible ug/ml     Nitrofurantoin <=16 Susceptible ug/ml     Piperacillin + Tazobactam <=4 Susceptible ug/ml     Tetracycline <=1 Susceptible ug/ml     Trimethoprim + Sulfamethoxazole <=20 Susceptible ug/ml   POC Urinalysis Dipstick, Multipro (Automated dipstick)    Specimen: Urine   Result Value Ref Range    Color Yellow Yellow, Straw, Dark Yellow, Deborah    Clarity, UA Cloudy (A) Clear    Glucose, UA Negative Negative, 1000 mg/dL (3+) mg/dL    Bilirubin Small (1+) (A) Negative    Ketones, UA 15 mg/dL (A) Negative    Specific Gravity  1.030 1.005 - 1.030    Blood, UA Large (A) Negative    pH, Urine 6.0 5.0 - 8.0    Protein,  mg/dL (A) Negative mg/dL    Urobilinogen, UA Normal Normal    Nitrite, UA Negative Negative    Leukocytes Trace (A) Negative       A1C:No results for input(s): HGBA1C in the last 26737 hours.  LIPID:No results for input(s): CHLPL, LDL, HDL, TRIG in the last 13888 hours.  PSA:No results for input(s): PSA in the last 45165 hours.  CBC:  Lab Results - Last 18 Months   Lab Units 05/28/20  1902   WBC 10*3/mm3 10.19   HEMOGLOBIN g/dL 12.1   HEMATOCRIT % 36.9   PLATELETS 10*3/mm3 276      BMP/CMP:  Lab Results - Last 18 Months   Lab Units 05/28/20  1902   SODIUM mmol/L 139   POTASSIUM mmol/L 4.0   CHLORIDE mmol/L 103   CO2 mmol/L 20.0*  "  BUN mg/dL 10   CREATININE mg/dL 0.63   EGFR IF NONAFRICN AM mL/min/1.73 116   CALCIUM mg/dL 9.3     HEPATIC:  Lab Results - Last 18 Months   Lab Units 05/28/20  1902   ALT (SGPT) U/L 36*   AST (SGOT) U/L 20   ALK PHOS U/L 69     THYROID:No results for input(s): TSH, FREET4, FTI in the last 00456 hours.    Invalid input(s): FREET3, T3, T4, TEUP,  TOTALT4    Objective   /76 (BP Location: Left arm, Patient Position: Sitting, Cuff Size: Large Adult)   Pulse 114   Temp 97.7 °F (36.5 °C) (Infrared)   Resp 18   Ht 170.2 cm (67\")   Wt 126 kg (277 lb 6.4 oz)   LMP 10/06/2021 (Approximate)   SpO2 99%   Breastfeeding No   BMI 43.45 kg/m²   Body mass index is 43.45 kg/m².    Recent Vitals       10/12/2021 10/18/2021 11/3/2021       BP: -- -- 112/76     Pulse: 86 127 114     Temp: 98.6 °F (37 °C) 97.5 °F (36.4 °C) 97.7 °F (36.5 °C)     Weight: 125 kg (275 lb) 124 kg (274 lb) 126 kg (277 lb 6.4 oz)     BMI (Calculated): 43.1 42.9 43.4           Physical Exam  GENERAL:  Well nourished/developed in no acute distress.   SKIN: Turgor excellent, without wound, rash, lesion.  HEENT: Normal cephalic without trauma.  Pupils equal round reactive to light. Extraocular motions full without nystagmus.   External canals nonobstructive nontender without reddness. Tymphatic membranes raman with cheikh structures intact.   Oral cavity without growths, exudates, and moist.  Posterior pharynx without mass, obstruction, redness.  No thyromegaly, mass, tenderness, lymphadenopathy and supple.  CV: Regular rhythm.  No murmur, gallop,  edema. Posterior pulses intact.  No carotid bruits.  CHEST: No chest wall tenderness or mass.   LUNGS: Symmetric motion with clear to auscultation.  No dullness to percussion  ABD: Soft, nontender without mass.   ORTHO: Symmetric extremities without swelling/point tenderness.  Full gross range of motion.  NEURO: CN 2-12 grossly intact.  Symmetric facies and UE/LE. 3/5 strength throughout. Nonfocal use " extremities. Speech clear.   PSYCH: Oriented x 3.  Pleasant calm, well kept.  Purposeful/directed conservation with intact short/long gross memory.     Assessment/Plan     1. Dysfunction of left eustachian tube    2. Non-seasonal allergic rhinitis, unspecified trigger    3. Acute vaginitis    4. History of asthma    5. Bronchitis        Discussion:  Probably eustachian tube dysfunction on the left despite rather unremarkable exam.  Due to the chronicity suggest she Dr. Johnston for possible to  Continue Flonase and others for nasal allergies  3 days Diflucan and call if it does not resolve the vaginal discharge or irritation  Couple months ending in the fall season of Symbicort    Vaccine reviewed: today none; later covid- covid booster, seasonal flu as suggested  Screening reviewed/updated    Medical decision issues:   Data review above:   Rx: reviewed and decisions:   Same Rx for now otherwise    Visit today involved chronic significant medical problems or differentials and/or intensive drug monitoring: ie potential to cause serious morbidity.  No orders of the defined types were placed in this encounter.      Orders placed:   LAB/Testing/Referrals: reviewed/orders:   Today:   No orders of the defined types were placed in this encounter.    Chronic/recurrent labs above or change to:   same     Health maintenance:   Body mass index is 43.45 kg/m².  Patient's Body mass index is 43.45 kg/m². indicating that she is morbidly obese (BMI > 40 or > 35 with obesity - related health condition). Obesity-related health conditions include the following: allergies. Obesity is unchanged. BMI is is above average; BMI management plan is completed. We discussed portion control and increasing exercise..      Tobacco use reviewed:   My Chavez  reports that she quit smoking about 5 years ago. Her smoking use included cigarettes. She started smoking about 7 years ago. She smoked 0.10 packs per day. She has never used smokeless  tobacco..       There are no Patient Instructions on file for this visit.    Follow up: No follow-ups on file.  Future Appointments   Date Time Provider Department Center   11/16/2021  2:30 PM Mak Nuñez MD [facility]

## 2021-11-18 DIAGNOSIS — F32.89 OTHER DEPRESSION: Primary | ICD-10-CM

## 2021-11-18 NOTE — TELEPHONE ENCOUNTER
Pt called office with positive pregnancy test and scheduled first OB appt for 12/8/2021.  Pt concerned with medications she is currently taking.  Dr Hays updated and pt then advised to stop nuva ring and methylphenidate.  Pt advised to decreased xanax frequency as tolerated.  Pt can cont zyrtec.  Pt advised that Celexa will be switched to Zoloft.  Pt to call office with any further questions or concerns and voiced understanding.  Zoloft 50 mg pended

## 2021-11-18 NOTE — TELEPHONE ENCOUNTER
Approving pended script now.  Please make sure patient knows that she can simply replace her celexa from one day with zoloft the following day.  Thanks

## 2021-11-23 ENCOUNTER — OFFICE VISIT (OUTPATIENT)
Dept: OBGYN CLINIC | Age: 26
End: 2021-11-23
Payer: COMMERCIAL

## 2021-11-23 VITALS
WEIGHT: 280 LBS | BODY MASS INDEX: 43.95 KG/M2 | HEART RATE: 103 BPM | HEIGHT: 67 IN | SYSTOLIC BLOOD PRESSURE: 135 MMHG | DIASTOLIC BLOOD PRESSURE: 87 MMHG

## 2021-11-23 DIAGNOSIS — N91.2 AMENORRHEA: Primary | ICD-10-CM

## 2021-11-23 DIAGNOSIS — Z36.89 CONFIRM FETAL CARDIAC ACTIVITY USING ULTRASOUND: ICD-10-CM

## 2021-11-23 LAB
CONTROL: ABNORMAL
PREGNANCY TEST URINE, POC: ABNORMAL

## 2021-11-23 PROCEDURE — 99203 OFFICE O/P NEW LOW 30 MIN: CPT | Performed by: NURSE PRACTITIONER

## 2021-11-23 PROCEDURE — 81025 URINE PREGNANCY TEST: CPT | Performed by: NURSE PRACTITIONER

## 2021-11-23 ASSESSMENT — ENCOUNTER SYMPTOMS
RESPIRATORY NEGATIVE: 1
GASTROINTESTINAL NEGATIVE: 1
EYES NEGATIVE: 1

## 2021-11-23 NOTE — PROGRESS NOTES
Ismael Nino is a 32 y.o. female who presents today for her medical conditions/ complaints as noted below. Ismael Nino is c/o of Establish Care and Confirmation        HPI  Patient presents today as a new patient for pregnancy confirmation   First pregnancy  lmp 21    Was on ritalin, celexa, xanax, nuva ring and zyrtec d but stopped  due to positive pregnancy. Has been on celexa since 12 yr old. Feeling ok without it for now. Some nausea. Desires physician but doesn't care which dr  Patient's last menstrual period was 2021.     Past Medical History:   Diagnosis Date    Depression     PCOS (polycystic ovarian syndrome)      Past Surgical History:   Procedure Laterality Date    CHOLECYSTECTOMY      LEG SURGERY Right     spider bite    TONSILLECTOMY       History reviewed. No pertinent family history. Social History     Tobacco Use    Smoking status: Never Smoker    Smokeless tobacco: Never Used   Substance Use Topics    Alcohol use: Not Currently       No current outpatient medications on file. No current facility-administered medications for this visit. No Known Allergies  Vitals:    21 1328   BP: 135/87   Pulse: 103     Body mass index is 43.85 kg/m². Review of Systems   Constitutional: Negative. HENT: Negative. Eyes: Negative. Respiratory: Negative. Cardiovascular: Negative. Gastrointestinal: Negative. Genitourinary: Positive for menstrual problem (amenorrhea). Negative for difficulty urinating, dyspareunia, dysuria, enuresis, frequency, hematuria, pelvic pain, urgency and vaginal discharge. Musculoskeletal: Negative. Skin: Negative. Neurological: Negative. Psychiatric/Behavioral: Negative. Physical Exam  Vitals and nursing note reviewed. Constitutional:       General: She is not in acute distress. Appearance: She is well-developed. She is not diaphoretic. HENT:      Head: Normocephalic and atraumatic.    Eyes: Conjunctiva/sclera: Conjunctivae normal.      Pupils: Pupils are equal, round, and reactive to light. Pulmonary:      Effort: Pulmonary effort is normal.   Abdominal:      Tenderness: There is no guarding. Musculoskeletal:         General: Normal range of motion. Cervical back: Normal range of motion. Comments: Normal ROM in all 4 extremities; normal gait   Skin:     General: Skin is warm and dry. Neurological:      Mental Status: She is alert and oriented to person, place, and time. Motor: No abnormal muscle tone. Coordination: Coordination normal.   Psychiatric:         Behavior: Behavior normal.          Diagnosis Orders   1. Amenorrhea  POCT urine pregnancy   2. Confirm fetal cardiac activity using ultrasound  US OB TRANSVAGINAL       MEDICATIONS:  No orders of the defined types were placed in this encounter. ORDERS:  Orders Placed This Encounter   Procedures    US OB TRANSVAGINAL    POCT urine pregnancy       PLAN:  Pregnancy recommendations discussed. meds discussed  On pnv  To call if nausea worsens  Nob and US in 2-3 weeks  There are no Patient Instructions on file for this visit.

## 2021-12-06 ENCOUNTER — INITIAL PRENATAL (OUTPATIENT)
Dept: OBGYN CLINIC | Age: 26
End: 2021-12-06

## 2021-12-06 ENCOUNTER — HOSPITAL ENCOUNTER (OUTPATIENT)
Dept: ULTRASOUND IMAGING | Age: 26
Discharge: HOME | End: 2021-12-06
Payer: COMMERCIAL

## 2021-12-06 VITALS
DIASTOLIC BLOOD PRESSURE: 80 MMHG | HEART RATE: 92 BPM | BODY MASS INDEX: 43.23 KG/M2 | SYSTOLIC BLOOD PRESSURE: 121 MMHG | WEIGHT: 276 LBS

## 2021-12-06 DIAGNOSIS — Z3A.01 7 WEEKS GESTATION OF PREGNANCY: Primary | ICD-10-CM

## 2021-12-06 DIAGNOSIS — Z36.9 ANTENATAL SCREENING ENCOUNTER: ICD-10-CM

## 2021-12-06 DIAGNOSIS — O99.211 OBESITY AFFECTING PREGNANCY IN FIRST TRIMESTER: ICD-10-CM

## 2021-12-06 DIAGNOSIS — O21.9 NAUSEA AND VOMITING IN PREGNANCY: ICD-10-CM

## 2021-12-06 DIAGNOSIS — Z36.89 CONFIRM FETAL CARDIAC ACTIVITY USING ULTRASOUND: ICD-10-CM

## 2021-12-06 LAB
ABO/RH: NORMAL
ANTIBODY SCREEN: NORMAL

## 2021-12-06 PROCEDURE — 76817 TRANSVAGINAL US OBSTETRIC: CPT

## 2021-12-06 PROCEDURE — 0500F INITIAL PRENATAL CARE VISIT: CPT | Performed by: NURSE PRACTITIONER

## 2021-12-06 RX ORDER — ONDANSETRON 4 MG/1
4 TABLET, ORALLY DISINTEGRATING ORAL EVERY 8 HOURS PRN
Qty: 40 TABLET | Refills: 2 | Status: SHIPPED | OUTPATIENT
Start: 2021-12-06

## 2021-12-06 RX ORDER — ACETAMINOPHEN 325 MG/1
650 TABLET ORAL EVERY 6 HOURS PRN
COMMUNITY

## 2021-12-06 RX ORDER — CALCIUM CARBONATE 200(500)MG
1 TABLET,CHEWABLE ORAL DAILY
COMMUNITY

## 2021-12-06 NOTE — PROGRESS NOTES
Patient presents today for initial ob visit. Pt denies any vaginal leaking bleeding or contractions. Patient has c/o nausea and vomiting. Desires rx  She wants physician for delivery  Would like to wait until next appt for pap smear  S:Adeola Aguilar is here for a new obstetrical visit. Today she is 7w5d weeks EGA. She had US today, preliminary and discussed w pt. Is having n/v, not severe. Did change PNV, requests meds. Is interested in panorama/carrier screen next visit. 1st pregnancy. Excited and nervous. SO at bedside. She  does not have vaginal bleeding, leaking of fluid, contractions. She does not have blurred vision, SOB, or increased swelling in legs or face. Pt does not feel fetal movement regularly. O:   Vitals:    21 1354   BP: 121/80   Pulse: 92   Weight: 276 lb (125.2 kg)     Pt is A&Ox3, in no acute distress. Normocephalic, atraumatic. PERRL. Resp even and non-labored. Skin pink, warm & dry. Gravid abdomen. DUTTA's well. Gait steady. See OB flowsheet. A: Normal IUP at 7w5d wks      Diagnosis Orders   1. 7 weeks gestation of pregnancy     2.  screening encounter  Chlamydia/N. Gonorrhoeae/T. Vaginalis    Varicella Zoster Antibody, IgG    HIV Obstetric Panel    Culture, Urine    Hemoglobinopathy Eval (Electrophoresis)   3. Obesity affecting pregnancy in first trimester     4. Nausea and vomiting in pregnancy  ondansetron (ZOFRAN ODT) 4 MG disintegrating tablet       P:   Pt counseled on pregnancy recommendations discussed and Genetic testing Plan of care was discussed with patient. Patient was encouraged to adhere to a well-balanced diet, including increasing water intake and limiting excessive caffeine and salt. The benefits of exercise were discussed; however she was advised against heavy lifting, sit-ups and abdominal crunches. A list of safe OTC medications was provided and discussed. The patient was cautioned against the use of tanning beds, hot tubs, saunas, and x-rays. Avoidance of tobacco, alcohol and illicit drugs was also discussed due to harmful effects on the fetus and increased risks associated with pregnancy. Certain labs and ultrasounds are required at certain times during pregnancy but others are optional, including the serum integrated screen/[place]/AFP/ Panorama, and other genetic testing. The patient was encouraged to attend childbirth classes and [facility] hospital information was provided based on patients hospital of choice. Continue with routine prenatal care. RTC in 4 wk for prenatal visit    MEDICATIONS:  Orders Placed This Encounter   Medications    ondansetron (ZOFRAN ODT) 4 MG disintegrating tablet     Sig: Take 1 tablet by mouth every 8 hours as needed for Nausea or Vomiting     Dispense:  40 tablet     Refill:  2       ORDERS:  Orders Placed This Encounter   Procedures    Chlamydia/N. Gonorrhoeae/T. Vaginalis    Culture, Urine    Varicella Zoster Antibody, IgG    HIV Obstetric Panel    Hemoglobinopathy Eval (Electrophoresis)

## 2021-12-07 LAB
C. TRACHOMATIS DNA ,URINE: NEGATIVE
N. GONORRHOEAE DNA, URINE: NEGATIVE
TRICHOMONAS VAGINALIS DNA, URINE: NEGATIVE

## 2021-12-08 LAB — URINE CULTURE, ROUTINE: NORMAL

## 2021-12-09 LAB
BASOPHILS ABSOLUTE: 0.1 K/UL (ref 0–0.2)
BASOPHILS RELATIVE PERCENT: 0.7 % (ref 0–1)
EOSINOPHILS ABSOLUTE: 0.3 K/UL (ref 0–0.6)
EOSINOPHILS RELATIVE PERCENT: 2.5 % (ref 0–5)
HCT VFR BLD CALC: 41.5 % (ref 37–47)
HEMOGLOBIN A-1 QUANTITATION: 96.9 % (ref 95–97.9)
HEMOGLOBIN A2 QUANTITATION: 2.9 % (ref 2–3.5)
HEMOGLOBIN C QUANTITATION: 0 % (ref 0–0)
HEMOGLOBIN E QUANTITATION: 0 % (ref 0–0)
HEMOGLOBIN ELECTROPHORESIS: NORMAL
HEMOGLOBIN EVALUATION: NORMAL
HEMOGLOBIN F QUANTITATION: 0.2 % (ref 0–2.1)
HEMOGLOBIN OTHER: 0 % (ref 0–0)
HEMOGLOBIN S QUANTITATION: 0 % (ref 0–0)
HEMOGLOBIN: 13.4 G/DL (ref 12–16)
HEPATITIS B SURFACE ANTIGEN INTERPRETATION: ABNORMAL
HIV-1 P24 AG: ABNORMAL
IMMATURE GRANULOCYTES #: 0.1 K/UL
LYMPHOCYTES ABSOLUTE: 2.1 K/UL (ref 1.1–4.5)
LYMPHOCYTES RELATIVE PERCENT: 20.4 % (ref 20–40)
MCH RBC QN AUTO: 26.2 PG (ref 27–31)
MCHC RBC AUTO-ENTMCNC: 32.3 G/DL (ref 33–37)
MCV RBC AUTO: 81.2 FL (ref 81–99)
MONOCYTES ABSOLUTE: 0.7 K/UL (ref 0–0.9)
MONOCYTES RELATIVE PERCENT: 6.4 % (ref 0–10)
NEUTROPHILS ABSOLUTE: 7.1 K/UL (ref 1.5–7.5)
NEUTROPHILS RELATIVE PERCENT: 69.5 % (ref 50–65)
PDW BLD-RTO: 14 % (ref 11.5–14.5)
PLATELET # BLD: 287 K/UL (ref 130–400)
PMV BLD AUTO: 10 FL (ref 9.4–12.3)
RAPID HIV 1&2: ABNORMAL
RBC # BLD: 5.11 M/UL (ref 4.2–5.4)
RPR: ABNORMAL
RUBELLA ANTIBODY IGG: REACTIVE
SICKLE CELL: NORMAL
WBC # BLD: 10.3 K/UL (ref 4.8–10.8)

## 2021-12-13 LAB — VZV IGG SER QL IA: 2.33

## 2021-12-14 ENCOUNTER — TELEPHONE (OUTPATIENT)
Dept: OBGYN CLINIC | Age: 26
End: 2021-12-14

## 2021-12-16 ENCOUNTER — TELEPHONE (OUTPATIENT)
Dept: FAMILY MEDICINE CLINIC | Facility: CLINIC | Age: 26
End: 2021-12-16

## 2021-12-16 NOTE — TELEPHONE ENCOUNTER
Caller: Kathy My    Relationship: Self    Best call back number: [phone]    What medication are you requesting: WHATEVER SHE CAN TAKE    What are your current symptoms: NOSE STUFFY, COUGH, DRAINAGE    How long have you been experiencing symptoms:   1 WEEK    If a prescription is needed, what is your preferred pharmacy and phone number:    [id number] [phone]  Additional notes:    PATIENT IS 9 WEEKS PREGNANT AND NEG FOR COVID AND WOULD LIKE TO KNOW WHAT SHE CAN TAKE FOR HER SYMPTOMS OR IF THERE WAS SOMETHING DR FUNG COULD SEND IN FOR HER. PLEASE ADVISE

## 2021-12-17 ENCOUNTER — OFFICE VISIT (OUTPATIENT)
Dept: URGENT CARE | Age: 26
End: 2021-12-17
Payer: COMMERCIAL

## 2021-12-17 VITALS
HEART RATE: 100 BPM | HEIGHT: 67 IN | RESPIRATION RATE: 26 BRPM | BODY MASS INDEX: 44.42 KG/M2 | OXYGEN SATURATION: 100 % | TEMPERATURE: 97.6 F | DIASTOLIC BLOOD PRESSURE: 78 MMHG | SYSTOLIC BLOOD PRESSURE: 124 MMHG | WEIGHT: 283 LBS

## 2021-12-17 DIAGNOSIS — J01.90 ACUTE SINUSITIS, RECURRENCE NOT SPECIFIED, UNSPECIFIED LOCATION: Primary | ICD-10-CM

## 2021-12-17 PROCEDURE — 99213 OFFICE O/P EST LOW 20 MIN: CPT | Performed by: NURSE PRACTITIONER

## 2021-12-17 RX ORDER — AMOXICILLIN 875 MG/1
875 TABLET, COATED ORAL 2 TIMES DAILY
Qty: 20 TABLET | Refills: 0 | Status: SHIPPED | OUTPATIENT
Start: 2021-12-17 | End: 2021-12-27

## 2021-12-17 ASSESSMENT — ENCOUNTER SYMPTOMS
SINUS COMPLAINT: 1
SINUS PRESSURE: 1
COUGH: 1

## 2021-12-17 NOTE — PROGRESS NOTES
[address] [facility] URGENT CARE  [address] [address] [place] [address]  Dept: [phone]  Dept Fax: [phone]  Loc: [phone]    Teresita Lockett is a 32 y.o. female who presents today for her medical conditions/complaintsas noted below. Teresita Lockett is c/o of Congestion (patient is 9 weeks preg), Cough, and Chest Congestion        HPI:     Sinus Problem  This is a new problem. Episode onset: over a week. The problem has been gradually worsening since onset. There has been no fever. The pain is moderate. Associated symptoms include congestion, coughing and sinus pressure. Pertinent negatives include no chills. Pt works in a lab. Had a negative covid-19 test. Pt is 9 weeks pregnant. Past Medical History:   Diagnosis Date    Depression     PCOS (polycystic ovarian syndrome)      Past Surgical History:   Procedure Laterality Date    CHOLECYSTECTOMY      LEG SURGERY Right     spider bite    TONSILLECTOMY         No family history on file. Social History     Tobacco Use    Smoking status: Never Smoker    Smokeless tobacco: Never Used   Substance Use Topics    Alcohol use: Not Currently      Current Outpatient Medications   Medication Sig Dispense Refill    amoxicillin (AMOXIL) 875 MG tablet Take 1 tablet by mouth 2 times daily for 10 days 20 tablet 0    Prenatal MV-Min-Fe Fum-FA-DHA (PRENATAL 1 PO) Take by mouth      acetaminophen (TYLENOL) 325 MG tablet Take 650 mg by mouth every 6 hours as needed for Pain (Patient not taking: Reported on 12/17/2021)      calcium carbonate (TUMS) 500 MG chewable tablet Take 1 tablet by mouth daily (Patient not taking: Reported on 12/17/2021)      ondansetron (ZOFRAN ODT) 4 MG disintegrating tablet Take 1 tablet by mouth every 8 hours as needed for Nausea or Vomiting (Patient not taking: Reported on 12/17/2021) 40 tablet 2     No current facility-administered medications for this visit.      Allergies   Allergen Reactions    Naltrexone-Bupropion Hcl Er Nausea Only     Nausea       Health Maintenance   Topic Date Due    Hepatitis C screen  Never done    HPV vaccine (1 - 2-dose series) Never done    COVID-19 Vaccine (1) Never done    HIV screen  Never done    Pap smear  Never done    Varicella vaccine (1 of 2 - 2-dose childhood series) Never done    Flu vaccine (1) 09/01/2021    DTaP/Tdap/Td vaccine (2 - Td or Tdap) 06/17/2027    Hepatitis A vaccine  Aged Out    Hepatitis B vaccine  Aged Out    Hib vaccine  Aged Out    Meningococcal (ACWY) vaccine  Aged Out    Pneumococcal 0-64 years Vaccine  Aged Out       Subjective:     Review of Systems   Constitutional: Negative for chills and fever. HENT: Positive for congestion and sinus pressure. Respiratory: Positive for cough. All other systems reviewed and are negative.      :Objective      Physical Exam  Vitals and nursing note reviewed. Constitutional:       General: She is not in acute distress. Appearance: Normal appearance. She is well-developed. She is ill-appearing. She is not diaphoretic. HENT:      Head: Normocephalic and atraumatic. Right Ear: Tympanic membrane, ear canal and external ear normal.      Left Ear: Tympanic membrane, ear canal and external ear normal.      Nose: Congestion present. Mouth/Throat:      Mouth: Mucous membranes are moist.      Pharynx: Oropharynx is clear. No posterior oropharyngeal erythema. Eyes:      Conjunctiva/sclera: Conjunctivae normal.      Pupils: Pupils are equal, round, and reactive to light. Cardiovascular:      Rate and Rhythm: Normal rate and regular rhythm. Heart sounds: Normal heart sounds. No murmur heard. Pulmonary:      Effort: Pulmonary effort is normal. No respiratory distress. Breath sounds: Normal breath sounds. No wheezing, rhonchi or rales. Musculoskeletal:      Cervical back: Normal range of motion. Skin:     General: Skin is warm and dry. Findings: No rash. Neurological:      Mental Status: She is alert and oriented to person, place, and time. Psychiatric:         Mood and Affect: Mood normal.         Behavior: Behavior normal.       /78   Pulse 100   Temp 97.6 °F (36.4 °C) (Temporal)   Resp 26   Ht 5' 7\" (1.702 m)   Wt 283 lb (128.4 kg)   LMP 09/20/2021   SpO2 100%   BMI 44.32 kg/m²     :Assessment       Diagnosis Orders   1. Acute sinusitis, recurrence not specified, unspecified location         :Plan   1. Take full course of antibiotics  2. Monitor for fever and treat as needed  3. If patient is not improving or developing any new/worsening symptoms then return to clinic as needed or follow up with PCP       No orders of the defined types were placed in this encounter. No follow-ups on file. Orders Placed This Encounter   Medications    amoxicillin (AMOXIL) 875 MG tablet     Sig: Take 1 tablet by mouth 2 times daily for 10 days     Dispense:  20 tablet     Refill:  0       Patient given educational materials- see patient instructions. Discussed use, benefit, and side effects of prescribedmedications. All patient questions answered. Pt voiced understanding. Patient Instructions       Patient Education        Sinusitis: Care Instructions  Your Care Instructions     Sinusitis is an infection of the lining of the sinus cavities in your head. Sinusitis often follows a cold. It causes pain and pressure in your head and face. In most cases, sinusitis gets better on its own in 1 to 2 weeks. But some mild symptoms may last for several weeks. Sometimes antibiotics are needed. Follow-up care is a key part of your treatment and safety. Be sure to make and go to all appointments, and call your doctor if you are having problems. It's also a good idea to know your test results and keep a list of the medicines you take. How can you care for yourself at home?   · Take an over-the-counter pain medicine, such as acetaminophen (Tylenol), ibuprofen (Advil, Motrin), or naproxen (Aleve). Read and follow all instructions on the label. · If the doctor prescribed antibiotics, take them as directed. Do not stop taking them just because you feel better. You need to take the full course of antibiotics. · Be careful when taking over-the-counter cold or flu medicines and Tylenol at the same time. Many of these medicines have acetaminophen, which is Tylenol. Read the labels to make sure that you are not taking more than the recommended dose. Too much acetaminophen (Tylenol) can be harmful. · Breathe warm, moist air from a steamy shower, a hot bath, or a sink filled with hot water. Avoid cold, dry air. Using a humidifier in your home may help. Follow the directions for cleaning the machine. · Use saline (saltwater) nasal washes. This can help keep your nasal passages open and wash out mucus and bacteria. You can buy saline nose drops at a grocery store or drugstore. Or you can make your own at home by adding 1 teaspoon of salt and 1 teaspoon of baking soda to 2 cups of distilled water. If you make your own, fill a bulb syringe with the solution, insert the tip into your nostril, and squeeze gently. Dolores Morales your nose. · Put a hot, wet towel or a warm gel pack on your face 3 or 4 times a day for 5 to 10 minutes each time. · Try a decongestant nasal spray like oxymetazoline (Afrin). Do not use it for more than 3 days in a row. Using it for more than 3 days can make your congestion worse. When should you call for help? Call your doctor now or seek immediate medical care if:    · You have new or worse swelling or redness in your face or around your eyes.     · You have a new or higher fever. Watch closely for changes in your health, and be sure to contact your doctor if:    · You have new or worse facial pain.     · The mucus from your nose becomes thicker (like pus) or has new blood in it.     · You are not getting better as expected. Where can you learn more?   Go to https://chpepiceweb.[employer]uk [email or web address] org and sign in to your TutorDudeshart account. Enter T346 in the [facility] box to learn more about \"Sinusitis: Care Instructions. \"     If you do not have an account, please click on the \"Sign Up Now\" link. Current as of: December 2, 2020               Content Version: 13.0  © 8326-5765 [facility], [facility]. Care instructions adapted under license by [facility] ([facility]). If you have questions about a medical condition or this instruction, always ask your healthcare professional. [address] [address] any warranty or liability for your use of this information. 1. Take full course of antibiotics  2. Monitor for fever and treat as needed  3.  If patient is not improving or developing any new/worsening symptoms then return to clinic as needed or follow up with PCP             Electronically signed by GARY Kirkland on 12/17/2021 at 1:39 PM

## 2021-12-17 NOTE — PATIENT INSTRUCTIONS
Patient Education        Sinusitis: Care Instructions  Your Care Instructions     Sinusitis is an infection of the lining of the sinus cavities in your head. Sinusitis often follows a cold. It causes pain and pressure in your head and face. In most cases, sinusitis gets better on its own in 1 to 2 weeks. But some mild symptoms may last for several weeks. Sometimes antibiotics are needed. Follow-up care is a key part of your treatment and safety. Be sure to make and go to all appointments, and call your doctor if you are having problems. It's also a good idea to know your test results and keep a list of the medicines you take. How can you care for yourself at home? · Take an over-the-counter pain medicine, such as acetaminophen (Tylenol), ibuprofen (Advil, Motrin), or naproxen (Aleve). Read and follow all instructions on the label. · If the doctor prescribed antibiotics, take them as directed. Do not stop taking them just because you feel better. You need to take the full course of antibiotics. · Be careful when taking over-the-counter cold or flu medicines and Tylenol at the same time. Many of these medicines have acetaminophen, which is Tylenol. Read the labels to make sure that you are not taking more than the recommended dose. Too much acetaminophen (Tylenol) can be harmful. · Breathe warm, moist air from a steamy shower, a hot bath, or a sink filled with hot water. Avoid cold, dry air. Using a humidifier in your home may help. Follow the directions for cleaning the machine. · Use saline (saltwater) nasal washes. This can help keep your nasal passages open and wash out mucus and bacteria. You can buy saline nose drops at a grocery store or drugstore. Or you can make your own at home by adding 1 teaspoon of salt and 1 teaspoon of baking soda to 2 cups of distilled water. If you make your own, fill a bulb syringe with the solution, insert the tip into your nostril, and squeeze gently.  Bianca Kearns your nose.  · Put a hot, wet towel or a warm gel pack on your face 3 or 4 times a day for 5 to 10 minutes each time. · Try a decongestant nasal spray like oxymetazoline (Afrin). Do not use it for more than 3 days in a row. Using it for more than 3 days can make your congestion worse. When should you call for help? Call your doctor now or seek immediate medical care if:    · You have new or worse swelling or redness in your face or around your eyes.     · You have a new or higher fever. Watch closely for changes in your health, and be sure to contact your doctor if:    · You have new or worse facial pain.     · The mucus from your nose becomes thicker (like pus) or has new blood in it.     · You are not getting better as expected. Where can you learn more? Go to https://EPISpeTempo Payments.TicketGoose.com. org and sign in to your ozuke account. Enter U849 in the [employer] box to learn more about \"Sinusitis: Care Instructions. \"     If you do not have an account, please click on the \"Sign Up Now\" link. Current as of: December 2, 2020               Content Version: 13.0  © 5884-4522 Healthwise, [employer]. Care instructions adapted under license by [employer]. If you have questions about a medical condition or this instruction, always ask your healthcare professional. [address] any warranty or liability for your use of this information. 1. Take full course of antibiotics  2. Monitor for fever and treat as needed  3.  If patient is not improving or developing any new/worsening symptoms then return to clinic as needed or follow up with PCP

## 2022-01-03 ENCOUNTER — ROUTINE PRENATAL (OUTPATIENT)
Dept: OBGYN CLINIC | Age: 27
End: 2022-01-03

## 2022-01-03 VITALS
WEIGHT: 274 LBS | SYSTOLIC BLOOD PRESSURE: 114 MMHG | HEART RATE: 95 BPM | BODY MASS INDEX: 42.91 KG/M2 | DIASTOLIC BLOOD PRESSURE: 82 MMHG

## 2022-01-03 DIAGNOSIS — Z3A.11 11 WEEKS GESTATION OF PREGNANCY: Primary | ICD-10-CM

## 2022-01-03 DIAGNOSIS — F32.A DEPRESSION AFFECTING PREGNANCY: ICD-10-CM

## 2022-01-03 DIAGNOSIS — Z83.3 FAMILY HISTORY OF DIABETES DURING PREGNANCY: ICD-10-CM

## 2022-01-03 DIAGNOSIS — O99.340 DEPRESSION AFFECTING PREGNANCY: ICD-10-CM

## 2022-01-03 PROCEDURE — 0502F SUBSEQUENT PRENATAL CARE: CPT | Performed by: OBSTETRICS & GYNECOLOGY

## 2022-01-03 RX ORDER — CITALOPRAM 20 MG/1
20 TABLET ORAL DAILY
Qty: 30 TABLET | Refills: 3 | Status: SHIPPED | OUTPATIENT
Start: 2022-01-03

## 2022-01-03 NOTE — PROGRESS NOTES
Pt is here for prenatal appointment. She denies spotting, cramping, contractions. Pt would like to talk about getting put back on an anti-depressant. She was taken Celexa before she found out she was pregnant.

## 2022-01-03 NOTE — PATIENT INSTRUCTIONS
Patient Education        Weeks 10 to 14 of Your Pregnancy: Care Instructions  Overview     By weeks 10 to 15 of your pregnancy, the placenta has formed inside your uterus. The placenta's main job is to give your baby oxygen and nutrients through the umbilical cord. It's possible to hear your baby's heartbeat with a special ultrasound device. Your baby's organs are developing. The arms and legs can bend. This is a good time to think about testing for birth defects. There are two types of tests: screening and diagnostic. Screening tests show the chance that a baby has a certain birth defect. They can't tell you for sure that your baby has a problem. Diagnostic tests show if a baby has a certain birth defect. It's your choice whether to have these tests. You and your partner can talk to your doctor or midwife about tests for birth defects. Follow-up care is a key part of your treatment and safety. Be sure to make and go to all appointments, and call your doctor if you are having problems. It's also a good idea to know your test results and keep a list of the medicines you take. How can you care for yourself at home? Decide about tests  · You can have screening tests and diagnostic tests to check for birth defects. The decision to have a test for birth defects is personal. Think about your age, your chance of passing on a family disease, your need to know about any problems, and what you might do after you have the test results. ? Quadruple (quad) blood test. This screening test can be done between 15 and 22 weeks of pregnancy. It checks the amount of four substances in your blood. The doctor looks at these test results, along with your age and other factors, to find out the chance that your baby may have certain problems. ? Amniocentesis. This diagnostic test is used to look for chromosomal problems in the baby's cells.  It can be done between 15 and 20 weeks of pregnancy, usually around week 16.  ? Nuchal translucency test. This test uses ultrasound to measure the thickness of the area at the back of the baby's neck. An increase in the thickness can be an early sign of Down syndrome. ? Chorionic villus sampling (CVS). This is a test that looks for certain genetic problems with your baby. The same genes that are in your baby are in the placenta. A small piece of the placenta is taken out and tested. This test is done when you are 10 to 13 weeks pregnant. Ease discomfort  · Slow down and take naps when you feel tired. · If your emotions swing, talk to someone. · If your gums bleed, try a softer toothbrush. If your gums are puffy and bleed a lot, see your dentist.  · If you feel dizzy:  ? Get up slowly after sitting or lying down. ? Drink plenty of fluids. ? Eat small snacks to keep your blood sugar stable. ? Put your head between your legs as though you were tying your shoelaces. ? Lie down with your legs higher than your head. Use pillows to prop up your feet. · If you have a headache:  ? Lie down. ? Ask your partner or a good friend for a neck massage. ? Try cool cloths over your forehead or across the back of your neck. ? Use acetaminophen (Tylenol) for pain relief. Do not use nonsteroidal anti-inflammatory drugs (NSAIDs), such as ibuprofen (Advil, Motrin) or naproxen (Aleve), unless your doctor says it is okay. · If you have a nosebleed, pinch your nose gently, and hold it for a short while. To prevent nosebleeds, try massaging a small dab of petroleum jelly, such as Vaseline, in your nostrils. · If your nose is stuffed up, try saline (saltwater) nose sprays. Do not use decongestant sprays. Care for your breasts  · Wear a bra that gives you good support. · Know that changes in your breasts are normal.  ? Your breasts may get larger and more tender. Tenderness usually gets better by 12 weeks. ? Your nipples may get darker and larger, and small bumps around your nipples may show more. ?  The veins in your chest and breasts may show more. Where can you learn more? Go to https://chpepiceweb.[email or web address] org and sign in to your ihiji account. Enter V115 in the "[employer]" box to learn more about \"Weeks 10 to 14 of Your Pregnancy: Care Instructions. \"     If you do not have an account, please click on the \"Sign Up Now\" link. Current as of: June 16, 2021               Content Version: 13.1  © 0937-3638 Healthwise, Incorporated. Care instructions adapted under license by [facility] ([facility]). If you have questions about a medical condition or this instruction, always ask your healthcare professional. [place] [address] any warranty or liability for your use of this information. [address] [place] [address] OB/GYN   One Hour Glucose Test Instructions    The 1 Hour Glucose test is designed to screen for gestational diabetes. This screening test is usually performed between 26-29 weeks of gestation. Gestational diabetes results in higher than normal blood sugar levels and can lead to pregnancy complications if not diagnosed and treated. For this reason, we recommend that all women undergo screening.  - You may eat or drink a normal breakfast or lunch prior to the test, but please avoid anything that contains excessive sugar. For example, do not eat sugary cereals, candy or drink soda or fruit juice.  - You will be given this drink at the Outpatient Lab (#[id number]) located on the 1st floor of [address] [place] [address] the entire bottle of the glucose drink, within 10 minutes and note the time that you finish the drink. Also, inform the  of the time that you finish. After drinking the glucose, you may only have water until your blood is drawn.    - Your blood needs to be drawn precisely 1 hour after you finished the glucose drink.  If you have a prenatal appointment as well, when you arrive at the office please let the  know that you are doing the glucose test. Let her know the time you need to return to the lab area to have your blood drawn for the testing.  - You will also have a CBC drawn at this time to check your blood count and check your iron.      Please do not hesitate to call the office at [phone], option 2, if you have any additional questions

## 2022-01-18 ENCOUNTER — TELEPHONE (OUTPATIENT)
Dept: OBGYN CLINIC | Age: 27
End: 2022-01-18

## 2022-01-21 ENCOUNTER — TELEPHONE (OUTPATIENT)
Dept: OBGYN CLINIC | Age: 27
End: 2022-01-21

## 2022-02-03 ENCOUNTER — TELEMEDICINE (OUTPATIENT)
Dept: OBGYN CLINIC | Age: 27
End: 2022-02-03

## 2022-02-03 DIAGNOSIS — Z34.02 ENCOUNTER FOR SUPERVISION OF NORMAL FIRST PREGNANCY IN SECOND TRIMESTER: Primary | ICD-10-CM

## 2022-02-03 DIAGNOSIS — O26.899 RH NEGATIVE, ANTEPARTUM: ICD-10-CM

## 2022-02-03 DIAGNOSIS — N94.10 DYSPAREUNIA IN FEMALE: ICD-10-CM

## 2022-02-03 DIAGNOSIS — Z67.91 RH NEGATIVE, ANTEPARTUM: ICD-10-CM

## 2022-02-03 DIAGNOSIS — O99.210 OBESITY IN PREGNANCY: ICD-10-CM

## 2022-02-03 PROCEDURE — 0502F SUBSEQUENT PRENATAL CARE: CPT | Performed by: OBSTETRICS & GYNECOLOGY

## 2022-02-03 NOTE — PROGRESS NOTES
2/3/2022    TELEHEALTH EVALUATION -- Audio/Visual (During RKCDV-97 public health emergency)   Joseph Rinne is a 32 y.o. female 16w1d who presents for routine prenatal visit. The patient was seen and evaluated. There was quickening fetal movements. No vaginal bleeding or discharge. She c/o vaginal pain with intercourse and has avoided it for the past 2 weeks. She purchased lubricant but was unsure if she should use it. Reports she is prone to UTI's but denies dysuria.   Joseph Rinne is a 32 y.o. female with the following history as recorded in [facility]:  Patient Active Problem List    Diagnosis Date Noted    Rh negative, antepartum 2022    Obesity in pregnancy 2022     Current Outpatient Medications   Medication Sig Dispense Refill    citalopram (CELEXA) 20 MG tablet Take 1 tablet by mouth daily 30 tablet 3    Prenatal MV-Min-Fe Fum-FA-DHA (PRENATAL 1 PO) Take by mouth      acetaminophen (TYLENOL) 325 MG tablet Take 650 mg by mouth every 6 hours as needed for Pain       calcium carbonate (TUMS) 500 MG chewable tablet Take 1 tablet by mouth daily       ondansetron (ZOFRAN ODT) 4 MG disintegrating tablet Take 1 tablet by mouth every 8 hours as needed for Nausea or Vomiting 40 tablet 2     No current facility-administered medications for this visit. Allergies: Naltrexone-bupropion hcl er  Past Medical History:   Diagnosis Date    Depression     PCOS (polycystic ovarian syndrome)      Past Surgical History:   Procedure Laterality Date    CHOLECYSTECTOMY      LEG SURGERY Right     spider bite    TONSILLECTOMY       Family History   Problem Relation Age of Onset    Diabetes Father      Social History     Tobacco Use    Smoking status: Never Smoker    Smokeless tobacco: Never Used   Substance Use Topics    Alcohol use: Not Currently            Physical Exam  Constitutional:       General: She is not in acute distress. Appearance: Normal appearance.  She is not ill-appearing or diaphoretic. HENT:      Head: Normocephalic and atraumatic. Nose: Nose normal. No rhinorrhea. Eyes:      General: No scleral icterus. Right eye: No discharge. Left eye: No discharge. Extraocular Movements: Extraocular movements intact. Pulmonary:      Effort: Pulmonary effort is normal. No respiratory distress. Musculoskeletal:         General: Normal range of motion. Skin:     Coloration: Skin is not pale. Findings: No erythema or rash. Neurological:      Mental Status: She is alert and oriented to person, place, and time. Psychiatric:         Attention and Perception: Attention and perception normal.         Mood and Affect: Mood and affect normal.         Speech: Speech normal.         Behavior: Behavior normal. Behavior is cooperative. Thought Content: Thought content normal.         Cognition and Memory: Cognition and memory normal.         Judgment: Judgment normal.         Assessment:   Diagnosis Orders   1. Encounter for supervision of normal first pregnancy in second trimester     2. Obesity in pregnancy     3. Dyspareunia in female     4. Rh negative, antepartum               Plan:  1. SAB precautions   2. OK to try lubricant  3. Appointment made for Monday for vaginal exam to rule out vaginitis. 4. All questions answered                Darius Bates is a 32 y.o. female being evaluated by a Virtual Visit (video visit) encounter to address concerns as mentioned above. A caregiver was present when appropriate. Due to this being a TeleHealth encounter (During [facility] public health emergency), evaluation of the following organ systems was limited: Vitals/Constitutional/EENT/Resp/CV/GI//MS/Neuro/Skin/Heme-Lymph-Imm.   Pursuant to the emergency declaration under the [facility], [facility] waiver authority and the [employer] and [employer] Act, this Virtual Visit was conducted with patient's (and/or legal guardian's) consent, to reduce the patient's risk of exposure to COVID-19 and provide necessary medical care. The patient (and/or legal guardian) has also been advised to contact this office for worsening conditions or problems, and seek emergency medical treatment and/or call 911 if deemed necessary. Services were provided through a video synchronous discussion virtually to substitute for in-person clinic visit. Patient and provider were located at their individual homes. --Margaret Ortiz MD on 2/3/2022 at 1:34 PM    An electronic signature was used to authenticate this note.

## 2022-02-07 ENCOUNTER — ROUTINE PRENATAL (OUTPATIENT)
Dept: OBGYN CLINIC | Age: 27
End: 2022-02-07
Payer: COMMERCIAL

## 2022-02-07 VITALS
SYSTOLIC BLOOD PRESSURE: 112 MMHG | HEART RATE: 99 BPM | BODY MASS INDEX: 42.6 KG/M2 | DIASTOLIC BLOOD PRESSURE: 72 MMHG | WEIGHT: 272 LBS

## 2022-02-07 DIAGNOSIS — N89.8 VAGINAL DISCHARGE DURING PREGNANCY IN SECOND TRIMESTER: ICD-10-CM

## 2022-02-07 DIAGNOSIS — Z3A.16 16 WEEKS GESTATION OF PREGNANCY: ICD-10-CM

## 2022-02-07 DIAGNOSIS — N94.10 DYSPAREUNIA, FEMALE: Primary | ICD-10-CM

## 2022-02-07 DIAGNOSIS — O26.892 VAGINAL DISCHARGE DURING PREGNANCY IN SECOND TRIMESTER: ICD-10-CM

## 2022-02-07 PROCEDURE — 0502F SUBSEQUENT PRENATAL CARE: CPT | Performed by: OBSTETRICS & GYNECOLOGY

## 2022-02-07 RX ORDER — CLINDAMYCIN HYDROCHLORIDE 300 MG/1
300 CAPSULE ORAL 2 TIMES DAILY
Qty: 14 CAPSULE | Refills: 0 | Status: SHIPPED | OUTPATIENT
Start: 2022-02-07 | End: 2022-02-14

## 2022-02-07 NOTE — PROGRESS NOTES
Pt is here for prenatal appointment. She denies spotting, contractions. Pt states she is having some mild cramping, but mostly in her lower back. Pt states she thinks she thinks she has felt the baby move a couple times, but it is hard for her to explain it.

## 2022-02-07 NOTE — PROGRESS NOTES
Obie Villegas is a 32 y.o. female 16w5d who presents for evaluation of dyspareunia discussed at prenatal on  VV. Patient reports significant vaginal discomfort with intercourse for the past several weeks. It is not positional.  Denies vaginal bleeding but is having intermittent cramping throughout the day.   Obie Villegas is a 32 y.o. female with the following history as recorded in [facility]:  Patient Active Problem List    Diagnosis Date Noted    Rh negative, antepartum 2022    Obesity in pregnancy 2022     Current Outpatient Medications   Medication Sig Dispense Refill    clindamycin (CLEOCIN) 300 MG capsule Take 1 capsule by mouth 2 times daily for 7 days 14 capsule 0    citalopram (CELEXA) 20 MG tablet Take 1 tablet by mouth daily 30 tablet 3    Prenatal MV-Min-Fe Fum-FA-DHA (PRENATAL 1 PO) Take by mouth      acetaminophen (TYLENOL) 325 MG tablet Take 650 mg by mouth every 6 hours as needed for Pain       calcium carbonate (TUMS) 500 MG chewable tablet Take 1 tablet by mouth daily       ondansetron (ZOFRAN ODT) 4 MG disintegrating tablet Take 1 tablet by mouth every 8 hours as needed for Nausea or Vomiting 40 tablet 2     No current facility-administered medications for this visit.      Allergies: Naltrexone-bupropion hcl er  Past Medical History:   Diagnosis Date    Depression     PCOS (polycystic ovarian syndrome)      Past Surgical History:   Procedure Laterality Date    CHOLECYSTECTOMY      LEG SURGERY Right     spider bite    TONSILLECTOMY       Family History   Problem Relation Age of Onset    Diabetes Father      Social History     Tobacco Use    Smoking status: Never Smoker    Smokeless tobacco: Never Used   Substance Use Topics    Alcohol use: Not Currently         Mother's Prenatal Vitals  BP: 112/72  Weight: 272 lb (123.4 kg)  Pulse: 99  Patient Position: Sitting  Alb/Glu  Albumin: Negative  Glucose: Negative  Prenatal Fetal Information  Fetal Heart Rate: US-141  Movement: Absent  Physical Exam  Constitutional:       General: She is not in acute distress. Appearance: Normal appearance. She is not ill-appearing, toxic-appearing or diaphoretic. HENT:      Head: Normocephalic and atraumatic. Eyes:      Extraocular Movements: Extraocular movements intact. Pulmonary:      Effort: Pulmonary effort is normal. No respiratory distress. Abdominal:      Palpations: Abdomen is soft. Tenderness: There is no abdominal tenderness. Genitourinary:     Labia:         Right: No rash, tenderness, lesion or injury. Left: No rash, tenderness, lesion or injury. Vagina: Vaginal discharge present. Cervix: Normal.   Musculoskeletal:         General: No swelling or tenderness. Normal range of motion. Right lower leg: No edema. Left lower leg: No edema. Skin:     General: Skin is warm and dry. Findings: No rash. Neurological:      Mental Status: She is alert and oriented to person, place, and time. Psychiatric:         Attention and Perception: Attention and perception normal.         Mood and Affect: Mood and affect normal.         Speech: Speech normal.         Behavior: Behavior normal. Behavior is cooperative. Thought Content: Thought content normal.         Cognition and Memory: Cognition and memory normal.         Judgment: Judgment normal.         Microscopic wet-mount exam shows clue cells, excessive bacteria. Assessment:   Diagnosis Orders   1. Dyspareunia, female  POCT Wet Prep    AK WET DEMOND/ W PREPARATIONS   2. 16 weeks gestation of pregnancy     3. Vaginal discharge during pregnancy in second trimester  clindamycin (CLEOCIN) 300 MG capsule    POCT Wet Prep    AK WET DEMOND/ W PREPARATIONS             Plan:  1. SAB precautions   2. Return in 4 weeks  3. POCT wet prep  4. Propath sent  5. Cleocin 300 BID x 7 days    TRACY Sanchez am scribing for and in the presence of Dr. Alma Hopson.    IDr. Jessica Cannon, personally performed the services described in this documentation as scribed by Pari Gao in my presence, and it is both accurate and complete.

## 2022-02-11 ENCOUNTER — TELEPHONE (OUTPATIENT)
Dept: OBGYN CLINIC | Age: 27
End: 2022-02-11

## 2022-02-11 DIAGNOSIS — Z22.39 CARRIER OF UREAPLASMA UREALYTICUM: Primary | ICD-10-CM

## 2022-02-11 RX ORDER — AZITHROMYCIN 500 MG/1
1000 TABLET, FILM COATED ORAL ONCE
Qty: 2 TABLET | Refills: 0 | Status: SHIPPED | OUTPATIENT
Start: 2022-02-11 | End: 2022-02-11

## 2022-02-11 NOTE — TELEPHONE ENCOUNTER
Called and informed pt that her Propath indicates BV and ureaplasma. She was given Clindamycin at her appt. Zithromax 1 g sent to pharmacy. Pt v/u.

## 2022-02-14 DIAGNOSIS — N89.8 VAGINAL ITCHING: Primary | ICD-10-CM

## 2022-02-14 RX ORDER — FLUCONAZOLE 150 MG/1
150 TABLET ORAL
Qty: 2 TABLET | Refills: 0 | Status: SHIPPED | OUTPATIENT
Start: 2022-02-14 | End: 2022-02-20

## 2022-02-21 ENCOUNTER — TELEPHONE (OUTPATIENT)
Dept: OBGYN CLINIC | Age: 27
End: 2022-02-21

## 2022-02-21 NOTE — TELEPHONE ENCOUNTER
Pt was positive for covid on 2/18. Having mild cold symptoms. Advised pt to take OTC meds like mucinex, Flonase for symptoms.  Pt VU

## 2022-03-07 ENCOUNTER — ROUTINE PRENATAL (OUTPATIENT)
Dept: OBGYN CLINIC | Age: 27
End: 2022-03-07

## 2022-03-07 VITALS
HEART RATE: 100 BPM | WEIGHT: 278 LBS | BODY MASS INDEX: 43.54 KG/M2 | SYSTOLIC BLOOD PRESSURE: 110 MMHG | DIASTOLIC BLOOD PRESSURE: 72 MMHG

## 2022-03-07 DIAGNOSIS — Z34.02 ENCOUNTER FOR SUPERVISION OF NORMAL FIRST PREGNANCY IN SECOND TRIMESTER: Primary | ICD-10-CM

## 2022-03-07 PROCEDURE — 0502F SUBSEQUENT PRENATAL CARE: CPT | Performed by: OBSTETRICS & GYNECOLOGY

## 2022-03-07 NOTE — PROGRESS NOTES
Pt is here for prenatal appointment. She denies spotting, cramping, contractions. Pt states her legs have been swelling a lot. Pt states the past 2 days she has been wearing compression socks. She works 12 hour shifts.

## 2022-03-07 NOTE — PROGRESS NOTES
I, Kimberly Hernandez RN, am scribing for and in the presence of Dr. Gio Fan 3/7/2022/3:45 PM/sign      Subjective:  Matt Alcantar is here for a return obstetrical visit. Today she is 20w5d weeks EGA. She is having some swelling to her feet while at work. She started recently wearing compression socks. She  does not have vaginal bleeding, leaking , contractions, syncope, or headaches. .    Objective: Mother's Prenatal Vitals  BP: 110/72  Weight: 278 lb (126.1 kg)  Pulse: 100  Patient Position: Sitting  Alb/Glu  Albumin: Negative  Glucose: Negative  Prenatal Fetal Information  Fetal Heart Rate: US-152  Movement: Present  Pt is A&Ox3, in no acute distress. Normocephalic, atraumatic. PERRL. Resp even and non-labored. Skin pink, warm & dry. Gravid abdomen. DUTTA's well. Gait steady. Assessment:    IUP at 20w5d wks      Diagnosis Orders   1. Encounter for supervision of normal first pregnancy in second trimester       Plan:  Pt counseled on balanced nutrition, adequate fluid intake, taking PNV daily, and exercise along with GHTN precautions  Continue with routine prenatal care. Continue wearing compression socks and will elevate feet when at home  Has anatomy scan is vera'd for 3/8  RTC in 4 wks for prenatal visit    MEDICATIONS:  No orders of the defined types were placed in this encounter. ORDERS:  No orders of the defined types were placed in this encounter. I, Dr. Gio Fan, personally performed the services described in this documentation as scribed by Luiz Barker in my presence, and it is both accurate and complete.

## 2022-04-04 ENCOUNTER — ROUTINE PRENATAL (OUTPATIENT)
Dept: OBGYN CLINIC | Age: 27
End: 2022-04-04

## 2022-04-04 VITALS — DIASTOLIC BLOOD PRESSURE: 80 MMHG | BODY MASS INDEX: 43.38 KG/M2 | SYSTOLIC BLOOD PRESSURE: 124 MMHG | WEIGHT: 277 LBS

## 2022-04-04 DIAGNOSIS — O26.899 HEARTBURN DURING PREGNANCY, ANTEPARTUM: ICD-10-CM

## 2022-04-04 DIAGNOSIS — Z34.02 ENCOUNTER FOR SUPERVISION OF NORMAL FIRST PREGNANCY IN SECOND TRIMESTER: Primary | ICD-10-CM

## 2022-04-04 DIAGNOSIS — O35.HXX0 CLUB FOOT OF FETUS AFFECTING ANTEPARTUM CARE OF MOTHER, SINGLE OR UNSPECIFIED FETUS: ICD-10-CM

## 2022-04-04 DIAGNOSIS — O26.899 RH NEGATIVE STATE IN ANTEPARTUM PERIOD: ICD-10-CM

## 2022-04-04 DIAGNOSIS — Z3A.24 24 WEEKS GESTATION OF PREGNANCY: ICD-10-CM

## 2022-04-04 DIAGNOSIS — R12 HEARTBURN DURING PREGNANCY, ANTEPARTUM: ICD-10-CM

## 2022-04-04 DIAGNOSIS — Z67.91 RH NEGATIVE STATE IN ANTEPARTUM PERIOD: ICD-10-CM

## 2022-04-04 DIAGNOSIS — J30.2 SEASONAL ALLERGIES: ICD-10-CM

## 2022-04-04 PROCEDURE — 0502F SUBSEQUENT PRENATAL CARE: CPT | Performed by: OBSTETRICS & GYNECOLOGY

## 2022-04-04 NOTE — PROGRESS NOTES
Luis Melo is a 32 y.o. female 24w5d who presents for routine prenatal visit. The patient was seen and evaluated. There was positive fetal movements. No contractions, bleeding or leakage of fluid. Signs and symptoms of  labor as well as labor were reviewed. The S/S of Pre-Eclampsia were reviewed with the patient in detail. She is to report any of these if they occur. She currently denies any of these. She is having acid reflux, is eating Tums several times per day.   Luis Melo is a 32 y.o. female with the following history as recorded in [facility]:  Patient Active Problem List    Diagnosis Date Noted    Rh negative, antepartum 2022    Obesity in pregnancy 2022     Current Outpatient Medications   Medication Sig Dispense Refill    pantoprazole (PROTONIX) 40 MG tablet Take 1 tablet by mouth every morning (before breakfast) 30 tablet 3    loratadine (CLARITIN) 10 MG tablet Take 1 tablet by mouth daily 30 tablet 3    citalopram (CELEXA) 20 MG tablet Take 1 tablet by mouth daily 30 tablet 3    Prenatal MV-Min-Fe Fum-FA-DHA (PRENATAL 1 PO) Take by mouth      acetaminophen (TYLENOL) 325 MG tablet Take 650 mg by mouth every 6 hours as needed for Pain       calcium carbonate (TUMS) 500 MG chewable tablet Take 1 tablet by mouth daily       ondansetron (ZOFRAN ODT) 4 MG disintegrating tablet Take 1 tablet by mouth every 8 hours as needed for Nausea or Vomiting 40 tablet 2     No current facility-administered medications for this visit.      Allergies: Naltrexone-bupropion hcl er  Past Medical History:   Diagnosis Date    Depression     PCOS (polycystic ovarian syndrome)      Past Surgical History:   Procedure Laterality Date    CHOLECYSTECTOMY      LEG SURGERY Right     spider bite    TONSILLECTOMY       Family History   Problem Relation Age of Onset    Diabetes Father      Social History     Tobacco Use    Smoking status: Never Smoker    Smokeless tobacco: Never Used Substance Use Topics    Alcohol use: Not Currently         Mother's Prenatal Vitals  BP: 124/80  Weight: 277 lb (125.6 kg)  Patient Position: Sitting  Prenatal Fetal Information  Fetal Heart Rate: 150  Movement: Present  Physical Exam  Constitutional:       General: She is not in acute distress. Appearance: Normal appearance. She is not ill-appearing, toxic-appearing or diaphoretic. HENT:      Head: Normocephalic and atraumatic. Eyes:      Extraocular Movements: Extraocular movements intact. Pulmonary:      Effort: Pulmonary effort is normal. No respiratory distress. Abdominal:      Palpations: Abdomen is soft. Tenderness: There is no abdominal tenderness. Musculoskeletal:         General: No swelling or tenderness. Normal range of motion. Right lower leg: No edema. Left lower leg: No edema. Skin:     General: Skin is warm and dry. Findings: No rash. Neurological:      Mental Status: She is alert and oriented to person, place, and time. Psychiatric:         Attention and Perception: Attention and perception normal.         Mood and Affect: Mood and affect normal.         Speech: Speech normal.         Behavior: Behavior normal. Behavior is cooperative. Thought Content: Thought content normal.         Cognition and Memory: Cognition and memory normal.         Judgment: Judgment normal.           The patient had her 28 week labs ordered. T-Dap Vaccine (27-36 weeks): awaiting    The patient was instructed on fetal kick counts and was given a kick sheet to complete every 8 hours. She was instructed that the baby should move at a minimum of ten times within one hour after a meal. The patient was instructed to lay down on her left side twenty minutes after eating and count movements for up to one hour with a target value of ten movements.   She was instructed to notify the office if she did not make that target after two attempts or if after any attempt there was less than four movements. The patient reports that the targets have been made Yes. Assessment:   Diagnosis Orders   1. Encounter for supervision of normal first pregnancy in second trimester  Glucose 1 Hour Postprandial    CBC with Auto Differential   2. 24 weeks gestation of pregnancy     3. Rh negative state in antepartum period  Rhogam Immune Globulin, Antepartum   4. Heartburn during pregnancy, antepartum  pantoprazole (PROTONIX) 40 MG tablet   5. Seasonal allergies  pantoprazole (PROTONIX) 40 MG tablet    loratadine (CLARITIN) 10 MG tablet   6. Club foot of fetus affecting antepartum care of mother, single or unspecified fetus               Plan:  1. SAB precautions   2. Return in 3 weeks  3. Glucose 1 hr, cbc, rhogam ordered for next visit  4. Protonix sent  5. Claritin for allergies  6. Discussed sono findings and all questions answered  I Rock Moreira, am scribing for and in the presence of Dr. Kirt Dorsey. I, Dr. Kirt Dorsey, personally performed the services described in this documentation as scribed by Rock Moreira in my presence, and it is both accurate and complete.

## 2022-04-07 RX ORDER — PANTOPRAZOLE SODIUM 40 MG/1
40 TABLET, DELAYED RELEASE ORAL
Qty: 30 TABLET | Refills: 3 | Status: SHIPPED | OUTPATIENT
Start: 2022-04-07

## 2022-04-07 RX ORDER — LORATADINE 10 MG/1
10 TABLET ORAL DAILY
Qty: 30 TABLET | Refills: 3 | Status: SHIPPED | OUTPATIENT
Start: 2022-04-07

## 2022-05-02 ENCOUNTER — ROUTINE PRENATAL (OUTPATIENT)
Dept: OBGYN CLINIC | Age: 27
End: 2022-05-02
Payer: COMMERCIAL

## 2022-05-02 VITALS
HEART RATE: 88 BPM | SYSTOLIC BLOOD PRESSURE: 114 MMHG | WEIGHT: 281 LBS | BODY MASS INDEX: 44.01 KG/M2 | DIASTOLIC BLOOD PRESSURE: 72 MMHG

## 2022-05-02 DIAGNOSIS — O35.HXX0 CLUB FOOT OF FETUS AFFECTING ANTEPARTUM CARE OF MOTHER, SINGLE OR UNSPECIFIED FETUS: ICD-10-CM

## 2022-05-02 DIAGNOSIS — Z34.02 ENCOUNTER FOR SUPERVISION OF NORMAL FIRST PREGNANCY IN SECOND TRIMESTER: ICD-10-CM

## 2022-05-02 DIAGNOSIS — O26.899 RH NEGATIVE STATE IN ANTEPARTUM PERIOD: ICD-10-CM

## 2022-05-02 DIAGNOSIS — Z67.91 RH NEGATIVE STATE IN ANTEPARTUM PERIOD: ICD-10-CM

## 2022-05-02 DIAGNOSIS — Z3A.28 28 WEEKS GESTATION OF PREGNANCY: Primary | ICD-10-CM

## 2022-05-02 LAB
ABO/RH: NORMAL
ANTIBODY SCREEN: NORMAL
BASOPHILS ABSOLUTE: 0 K/UL (ref 0–0.2)
BASOPHILS RELATIVE PERCENT: 0.4 % (ref 0–1)
EOSINOPHILS ABSOLUTE: 0.2 K/UL (ref 0–0.6)
EOSINOPHILS RELATIVE PERCENT: 2 % (ref 0–5)
GLUCOSE, 1HR PP: 78 MG/DL (ref 75–140)
HCT VFR BLD CALC: 33.3 % (ref 37–47)
HEMOGLOBIN: 10.4 G/DL (ref 12–16)
IMMATURE GRANULOCYTES #: 0.1 K/UL
LYMPHOCYTES ABSOLUTE: 2.3 K/UL (ref 1.1–4.5)
LYMPHOCYTES RELATIVE PERCENT: 21.9 % (ref 20–40)
MCH RBC QN AUTO: 26 PG (ref 27–31)
MCHC RBC AUTO-ENTMCNC: 31.2 G/DL (ref 33–37)
MCV RBC AUTO: 83.3 FL (ref 81–99)
MONOCYTES ABSOLUTE: 0.7 K/UL (ref 0–0.9)
MONOCYTES RELATIVE PERCENT: 7.1 % (ref 0–10)
NEUTROPHILS ABSOLUTE: 7 K/UL (ref 1.5–7.5)
NEUTROPHILS RELATIVE PERCENT: 67.4 % (ref 50–65)
PDW BLD-RTO: 14.2 % (ref 11.5–14.5)
PLATELET # BLD: 200 K/UL (ref 130–400)
PMV BLD AUTO: 10.1 FL (ref 9.4–12.3)
RBC # BLD: 4 M/UL (ref 4.2–5.4)
RHIG LOT NUMBER: NORMAL
WBC # BLD: 10.3 K/UL (ref 4.8–10.8)

## 2022-05-02 PROCEDURE — 96372 THER/PROPH/DIAG INJ SC/IM: CPT | Performed by: OBSTETRICS & GYNECOLOGY

## 2022-05-02 PROCEDURE — 0502F SUBSEQUENT PRENATAL CARE: CPT | Performed by: OBSTETRICS & GYNECOLOGY

## 2022-05-02 NOTE — PROGRESS NOTES
Minh Hardy is a 32 y.o. female 28w5d who presents for routine prenatal visit. The patient was seen and evaluated. There was positive fetal movements. No contractions, bleeding or leakage of fluid. Signs and symptoms of  labor as well as labor were reviewed. The S/S of Pre-Eclampsia were reviewed with the patient in detail. She is to report any of these if they occur. She currently denies any of these.   Minh Hardy is a 32 y.o. female with the following history as recorded in [facility]:  Patient Active Problem List    Diagnosis Date Noted    Rh negative, antepartum 2022    Obesity in pregnancy 2022     Current Outpatient Medications   Medication Sig Dispense Refill    pantoprazole (PROTONIX) 40 MG tablet Take 1 tablet by mouth every morning (before breakfast) 30 tablet 3    loratadine (CLARITIN) 10 MG tablet Take 1 tablet by mouth daily 30 tablet 3    citalopram (CELEXA) 20 MG tablet Take 1 tablet by mouth daily 30 tablet 3    Prenatal MV-Min-Fe Fum-FA-DHA (PRENATAL 1 PO) Take by mouth      acetaminophen (TYLENOL) 325 MG tablet Take 650 mg by mouth every 6 hours as needed for Pain       calcium carbonate (TUMS) 500 MG chewable tablet Take 1 tablet by mouth daily       ondansetron (ZOFRAN ODT) 4 MG disintegrating tablet Take 1 tablet by mouth every 8 hours as needed for Nausea or Vomiting 40 tablet 2    ferrous sulfate (FE TABS) 325 (65 Fe) MG EC tablet Take 1 tablet by mouth 2 times daily 90 tablet 2    docusate sodium (COLACE) 100 MG capsule Take 1 capsule by mouth 3 times daily as needed for Constipation 90 capsule 0     No current facility-administered medications for this visit.      Allergies: Naltrexone-bupropion hcl er  Past Medical History:   Diagnosis Date    Depression     PCOS (polycystic ovarian syndrome)      Past Surgical History:   Procedure Laterality Date    CHOLECYSTECTOMY      LEG SURGERY Right     spider bite    TONSILLECTOMY       Family History Problem Relation Age of Onset    Diabetes Father      Social History     Tobacco Use    Smoking status: Never Smoker    Smokeless tobacco: Never Used   Substance Use Topics    Alcohol use: Not Currently         Mother's Prenatal Vitals  BP: 114/72  Weight: 281 lb (127.5 kg)  Pulse: 88  Patient Position: Sitting  Alb/Glu  Albumin: 1+  Glucose: Negative  Prenatal Fetal Information  Fetal Heart Rate: US-142  Movement: Present  Physical Exam  Constitutional:       General: She is not in acute distress. Appearance: Normal appearance. She is not ill-appearing, toxic-appearing or diaphoretic. HENT:      Head: Normocephalic and atraumatic. Eyes:      Extraocular Movements: Extraocular movements intact. Pulmonary:      Effort: Pulmonary effort is normal. No respiratory distress. Abdominal:      Palpations: Abdomen is soft. Tenderness: There is no abdominal tenderness. Musculoskeletal:         General: No swelling or tenderness. Normal range of motion. Right lower leg: No edema. Left lower leg: No edema. Skin:     General: Skin is warm and dry. Findings: No rash. Neurological:      Mental Status: She is alert and oriented to person, place, and time. Psychiatric:         Attention and Perception: Attention and perception normal.         Mood and Affect: Mood and affect normal.         Speech: Speech normal.         Behavior: Behavior normal. Behavior is cooperative. Thought Content: Thought content normal.         Cognition and Memory: Cognition and memory normal.         Judgment: Judgment normal.           The patient had her 28 week labs in process. T-Dap Vaccine (27-36 weeks): awaiting    The patient was instructed on fetal kick counts and was given a kick sheet to complete every 8 hours.  She was instructed that the baby should move at a minimum of ten times within one hour after a meal. The patient was instructed to lay down on her left side twenty minutes after eating and count movements for up to one hour with a target value of ten movements. She was instructed to notify the office if she did not make that target after two attempts or if after any attempt there was less than four movements. The patient reports that the targets have been made Yes. Assessment:   Diagnosis Orders   1. 28 weeks gestation of pregnancy  rho(D) immune globulin (HYPERRHO S/D) injection 300 mcg   2. Rh negative state in antepartum period  rho(D) immune globulin (HYPERRHO S/D) injection 300 mcg   3. Club foot of fetus affecting antepartum care of mother, single or unspecified fetus               Plan:  1. PTL precautions   2. Return in 2 weeks  3. Glucose 1 hr, cbc, rhogam today  I Chel Wagoner, am scribing for and in the presence of Dr. Pankaj Riley. I, Dr. Pankaj Riley, personally performed the services described in this documentation as scribed by Chel Wagoner in my presence, and it is both accurate and complete.

## 2022-05-02 NOTE — PATIENT INSTRUCTIONS
Patient Education        Weeks 26 to 30 of Your Pregnancy: Care Instructions  Overview     You are now entering your last trimester of pregnancy. Your baby is growing quickly. Theador Nassar probably feel your baby moving around more often. Your doctormay ask you to count your baby's kicks. Your back may ache as your body gets used to your baby's size and length. If you haven't already had the Tdap shot during this pregnancy, talk to your doctor about getting it. It will help protect your  against pertussisinfection. During this time, it's important to take care of yourself and pay attention to what your body needs. If you feel sexual, you can explore ways to be close withyour partner that match your comfort and desire. Follow-up care is a key part of your treatment and safety. Be sure to make and go to all appointments, and call your doctor if you are having problems. It's also a good idea to know your test results and keep alist of the medicines you take. How can you care for yourself at home? Take it easy at work   Take frequent breaks. If possible, stop working when you are tired, and rest during your lunch hour.  Take bathroom breaks every 2 hours.  Change positions often. If you sit for long periods, stand up and walk around.  When you stand for a long time, keep one foot on a low stool with your knee bent. After standing a lot, sit with your feet up.  Avoid fumes, chemicals, and tobacco smoke. Be sexual in your own way   Having sex during pregnancy is okay, unless your doctor tells you not to.  You may be very interested in sex, or you may have no interest at all.  Your growing belly can make it hard to find a good position during intercourse. [place] and explore.  You may get cramps in your uterus when your partner touches your breasts.  A back rub may relieve the backache or cramps that sometimes follow orgasm. Learn about  labor   Watch for signs of  labor.  You may be going into labor if:  ? You have menstrual-like cramps, with or without nausea. ? You have about 6 or more contractions in 1 hour, even after you have had a glass of water and are resting. ? You have a low, dull backache that does not go away when you change your position. ? You have pain or pressure in your pelvis that comes and goes in a pattern. ? You have intestinal cramping or flu-like symptoms, with or without diarrhea.  ? You notice an increase or change in your vaginal discharge. Discharge may be heavy, mucus-like, watery, or streaked with blood. ? Your water breaks.  If you think you have  labor:  ? Drink 2 or 3 glasses of water or juice. Not drinking enough fluids can cause contractions. ? Stop what you are doing, and empty your bladder. Then lie down on your left side for at least 1 hour. ? While lying on your side, find your breast bone. Put your fingers in the soft spot just below it. Move your fingers down toward your belly button to find the top of your uterus. Check to see if it is tight. ? Contractions can be weak or strong. Record your contractions for an hour. Time a contraction from the start of one contraction to the start of the next one.  ? Single or several strong contractions without a pattern are called [place]-Dillon contractions. They are practice contractions but not the start of labor. They often stop if you change what you are doing. ? Call your doctor if you have regular contractions. Where can you learn more? Go to [email or web address] org and sign in to your [employer] account. Enter M570 in the [facility] box to learn more about \"Weeks 26 to 30 of Your Pregnancy: Care Instructions. \"     If you do not have an account, please click on the \"Sign Up Now\" link. Current as of: 2021               Content Version: 13.2  © 7405-5161 [employer]. Care instructions adapted under license by [facility] [place] ([facility]).  If you have questions about a medical condition or this instruction, always ask your healthcare professional. [place] [address] any warranty or liability for your use of this information.

## 2022-05-02 NOTE — PROGRESS NOTES
After obtaining consent, and per orders of Dr. Grover Mai, injection of Rhogam given in right dorsogluteal by Chari Rosa RN. Patient instructed to remain in clinic for 20 minutes afterwards, and to report any adverse reaction to me immediately.  [address] [id number]

## 2022-05-03 DIAGNOSIS — K59.00 CONSTIPATION DURING PREGNANCY, ANTEPARTUM: ICD-10-CM

## 2022-05-03 DIAGNOSIS — O99.019 ANEMIA AFFECTING PREGNANCY, ANTEPARTUM: Primary | ICD-10-CM

## 2022-05-03 DIAGNOSIS — O99.619 CONSTIPATION DURING PREGNANCY, ANTEPARTUM: ICD-10-CM

## 2022-05-03 RX ORDER — DOCUSATE SODIUM 100 MG/1
100 CAPSULE, LIQUID FILLED ORAL 3 TIMES DAILY PRN
Qty: 90 CAPSULE | Refills: 0 | Status: SHIPPED | OUTPATIENT
Start: 2022-05-03 | End: 2022-06-02

## 2022-05-03 RX ORDER — LANOLIN ALCOHOL/MO/W.PET/CERES
325 CREAM (GRAM) TOPICAL 2 TIMES DAILY
Qty: 90 TABLET | Refills: 2 | Status: SHIPPED | OUTPATIENT
Start: 2022-05-03

## 2022-05-13 NOTE — PROGRESS NOTES
Melanie Sheth is a 32 y.o. female 11w5d who presents for routine prenatal visit. The patient was seen and evaluated. There was negative fetal movements. No contractions, bleeding or leakage of fluid. Signs and symptoms of  labor as well as labor were reviewed. The S/S of Pre-Eclampsia were reviewed with the patient in detail. She is to report any of these if they occur. She currently denies any of these. Pt states her nausea has improved, has occasional heartburn. She takes Tums with good relief. Pt was on Celexa pre pregnancy, stopped when she found out she was pregnant. She states she is feeling down, would like to restart it.   Melanie Sheth is a 32 y.o. female with the following history as recorded in [facility]: There are no problems to display for this patient. Current Outpatient Medications   Medication Sig Dispense Refill    citalopram (CELEXA) 20 MG tablet Take 1 tablet by mouth daily 30 tablet 3    Prenatal MV-Min-Fe Fum-FA-DHA (PRENATAL 1 PO) Take by mouth      acetaminophen (TYLENOL) 325 MG tablet Take 650 mg by mouth every 6 hours as needed for Pain       calcium carbonate (TUMS) 500 MG chewable tablet Take 1 tablet by mouth daily       ondansetron (ZOFRAN ODT) 4 MG disintegrating tablet Take 1 tablet by mouth every 8 hours as needed for Nausea or Vomiting 40 tablet 2     No current facility-administered medications for this visit.      Allergies: Naltrexone-bupropion hcl er  Past Medical History:   Diagnosis Date    Depression     PCOS (polycystic ovarian syndrome)      Past Surgical History:   Procedure Laterality Date    CHOLECYSTECTOMY      LEG SURGERY Right     spider bite    TONSILLECTOMY       Family History   Problem Relation Age of Onset    Diabetes Father      Social History     Tobacco Use    Smoking status: Never Smoker    Smokeless tobacco: Never Used   Substance Use Topics    Alcohol use: Not Currently         Mother's Prenatal Vitals  BP: 114/82  Weight: 274 lb (124.3 kg)  Pulse: 95  Patient Position: Sitting  Prenatal Fetal Information  Movement: Absent  Physical Exam  Constitutional:       General: She is not in acute distress. Appearance: Normal appearance. She is not ill-appearing, toxic-appearing or diaphoretic. HENT:      Head: Normocephalic and atraumatic. Eyes:      Extraocular Movements: Extraocular movements intact. Pulmonary:      Effort: Pulmonary effort is normal. No respiratory distress. Abdominal:      Palpations: Abdomen is soft. Tenderness: There is no abdominal tenderness. Musculoskeletal:         General: No swelling or tenderness. Normal range of motion. Right lower leg: No edema. Left lower leg: No edema. Skin:     General: Skin is warm and dry. Findings: No rash. Neurological:      Mental Status: She is alert and oriented to person, place, and time. Psychiatric:         Attention and Perception: Attention and perception normal.         Mood and Affect: Mood and affect normal.         Speech: Speech normal.         Behavior: Behavior normal. Behavior is cooperative. Thought Content: Thought content normal.         Cognition and Memory: Cognition and memory normal.         Judgment: Judgment normal.               Assessment:   Diagnosis Orders   1. 11 weeks gestation of pregnancy     2. Depression affecting pregnancy  citalopram (CELEXA) 20 MG tablet   3. Family history of diabetes during pregnancy               Plan:  1. SAB precautions   2. Return in 4 weeks  3. May restart Celexa 20 mg.   4. Early diabetic screening, may do in the next few weeks  I Mili Clancy, am scribing for and in the presence of Dr. Shahnaz See. I, Dr. Shahnaz See, personally performed the services described in this documentation as scribed by Mili Clancy in my presence, and it is both accurate and complete. Detail Level: Zone Detail Level: Generalized Detail Level: Detailed

## 2022-05-16 ENCOUNTER — ROUTINE PRENATAL (OUTPATIENT)
Dept: OBGYN CLINIC | Age: 27
End: 2022-05-16

## 2022-05-16 VITALS
SYSTOLIC BLOOD PRESSURE: 114 MMHG | BODY MASS INDEX: 44.32 KG/M2 | WEIGHT: 283 LBS | HEART RATE: 110 BPM | DIASTOLIC BLOOD PRESSURE: 68 MMHG

## 2022-05-16 DIAGNOSIS — O99.019 ANEMIA AFFECTING PREGNANCY, ANTEPARTUM: ICD-10-CM

## 2022-05-16 DIAGNOSIS — Z3A.30 30 WEEKS GESTATION OF PREGNANCY: ICD-10-CM

## 2022-05-16 DIAGNOSIS — O35.HXX0 CLUB FOOT OF FETUS AFFECTING ANTEPARTUM CARE OF MOTHER, SINGLE OR UNSPECIFIED FETUS: ICD-10-CM

## 2022-05-16 DIAGNOSIS — Z34.03 ENCOUNTER FOR SUPERVISION OF NORMAL FIRST PREGNANCY IN THIRD TRIMESTER: Primary | ICD-10-CM

## 2022-05-16 PROCEDURE — 0502F SUBSEQUENT PRENATAL CARE: CPT | Performed by: OBSTETRICS & GYNECOLOGY

## 2022-05-16 NOTE — PROGRESS NOTES
Minh Hardy is a 32 y.o. female 30w5d who presents for routine prenatal visit. The patient was seen and evaluated. There was positive fetal movements. No contractions, bleeding or leakage of fluid. Signs and symptoms of  labor as well as labor were reviewed. The S/S of Pre-Eclampsia were reviewed with the patient in detail. She is to report any of these if they occur. She currently denies any of these.   Minh Hardy is a 32 y.o. female with the following history as recorded in [facility]:  Patient Active Problem List    Diagnosis Date Noted    Rh negative, antepartum 2022    Obesity in pregnancy 2022     Current Outpatient Medications   Medication Sig Dispense Refill    ferrous sulfate (FE TABS) 325 (65 Fe) MG EC tablet Take 1 tablet by mouth 2 times daily 90 tablet 2    docusate sodium (COLACE) 100 MG capsule Take 1 capsule by mouth 3 times daily as needed for Constipation 90 capsule 0    pantoprazole (PROTONIX) 40 MG tablet Take 1 tablet by mouth every morning (before breakfast) 30 tablet 3    loratadine (CLARITIN) 10 MG tablet Take 1 tablet by mouth daily 30 tablet 3    citalopram (CELEXA) 20 MG tablet Take 1 tablet by mouth daily 30 tablet 3    Prenatal MV-Min-Fe Fum-FA-DHA (PRENATAL 1 PO) Take by mouth      acetaminophen (TYLENOL) 325 MG tablet Take 650 mg by mouth every 6 hours as needed for Pain       calcium carbonate (TUMS) 500 MG chewable tablet Take 1 tablet by mouth daily       ondansetron (ZOFRAN ODT) 4 MG disintegrating tablet Take 1 tablet by mouth every 8 hours as needed for Nausea or Vomiting 40 tablet 2     No current facility-administered medications for this visit.      Allergies: Naltrexone-bupropion hcl er  Past Medical History:   Diagnosis Date    Depression     PCOS (polycystic ovarian syndrome)      Past Surgical History:   Procedure Laterality Date    CHOLECYSTECTOMY      LEG SURGERY Right     spider bite    TONSILLECTOMY       Family History Problem Relation Age of Onset    Diabetes Father      Social History     Tobacco Use    Smoking status: Never Smoker    Smokeless tobacco: Never Used   Substance Use Topics    Alcohol use: Not Currently         Mother's Prenatal Vitals  BP: 114/68  Weight: 283 lb (128.4 kg)  Pulse: 110  Patient Position: Sitting  Alb/Glu  Albumin: Trace  Glucose: Negative  Prenatal Fetal Information  Fundal Height (cm): 31 cm  Fetal Heart Rate: 131  Movement: Present  Presentation: Vertex  Physical Exam  Constitutional:       General: She is not in acute distress. Appearance: Normal appearance. She is not ill-appearing, toxic-appearing or diaphoretic. HENT:      Head: Normocephalic and atraumatic. Eyes:      Extraocular Movements: Extraocular movements intact. Pulmonary:      Effort: Pulmonary effort is normal. No respiratory distress. Abdominal:      Palpations: Abdomen is soft. Tenderness: There is no abdominal tenderness. Musculoskeletal:         General: No swelling or tenderness. Normal range of motion. Right lower leg: No edema. Left lower leg: No edema. Skin:     General: Skin is warm and dry. Findings: No rash. Neurological:      Mental Status: She is alert and oriented to person, place, and time. Psychiatric:         Attention and Perception: Attention and perception normal.         Mood and Affect: Mood and affect normal.         Speech: Speech normal.         Behavior: Behavior normal. Behavior is cooperative. Thought Content: Thought content normal.         Cognition and Memory: Cognition and memory normal.         Judgment: Judgment normal.           The patient had her 28 week labs completed. T-Dap Vaccine (27-36 weeks): completed    The patient was instructed on fetal kick counts and was given a kick sheet to complete every 8 hours.  She was instructed that the baby should move at a minimum of ten times within one hour after a meal. The patient was instructed to lay down on her left side twenty minutes after eating and count movements for up to one hour with a target value of ten movements. She was instructed to notify the office if she did not make that target after two attempts or if after any attempt there was less than four movements. The patient reports that the targets have been made Yes. Assessment:   Diagnosis Orders   1. Encounter for supervision of normal first pregnancy in third trimester     2. 30 weeks gestation of pregnancy     3. Anemia affecting pregnancy, antepartum     4. Club foot of fetus affecting antepartum care of mother, single or unspecified fetus               Plan:  1. PTL precautions   2. Return in 2 weeks  I Tammie Fernandez, am scribing for and in the presence of Dr. Wild Salomon. I, Dr. Wild Salomon, personally performed the services described in this documentation as scribed by Tammie Fernandez in my presence, and it is both accurate and complete.

## 2022-05-30 ENCOUNTER — HOSPITAL ENCOUNTER (OUTPATIENT)
Age: 27
Discharge: HOME | End: 2022-05-30
Attending: OBSTETRICS & GYNECOLOGY | Admitting: OBSTETRICS & GYNECOLOGY
Payer: COMMERCIAL

## 2022-05-30 VITALS
HEART RATE: 96 BPM | WEIGHT: 283 LBS | BODY MASS INDEX: 44.42 KG/M2 | RESPIRATION RATE: 18 BRPM | TEMPERATURE: 97.6 F | SYSTOLIC BLOOD PRESSURE: 120 MMHG | DIASTOLIC BLOOD PRESSURE: 76 MMHG | HEIGHT: 67 IN | OXYGEN SATURATION: 100 %

## 2022-05-30 PROBLEM — Z3A.32 32 WEEKS GESTATION OF PREGNANCY: Status: ACTIVE | Noted: 2022-05-30

## 2022-05-30 LAB
BILIRUBIN URINE: NEGATIVE
BLOOD, URINE: NEGATIVE
CLARITY: CLEAR
COLOR: YELLOW
GLUCOSE URINE: NEGATIVE MG/DL
KETONES, URINE: ABNORMAL MG/DL
LEUKOCYTE ESTERASE, URINE: NEGATIVE
NITRITE, URINE: NEGATIVE
PH UA: 7 (ref 5–8)
PROTEIN UA: NEGATIVE MG/DL
SPECIFIC GRAVITY UA: 1 (ref 1–1.03)
UROBILINOGEN, URINE: 0.2 E.U./DL

## 2022-05-30 PROCEDURE — 81003 URINALYSIS AUTO W/O SCOPE: CPT

## 2022-05-30 PROCEDURE — 99212 OFFICE O/P EST SF 10 MIN: CPT

## 2022-05-31 ENCOUNTER — ROUTINE PRENATAL (OUTPATIENT)
Dept: OBGYN CLINIC | Age: 27
End: 2022-05-31

## 2022-05-31 VITALS
BODY MASS INDEX: 45.11 KG/M2 | SYSTOLIC BLOOD PRESSURE: 126 MMHG | WEIGHT: 288 LBS | HEART RATE: 102 BPM | DIASTOLIC BLOOD PRESSURE: 84 MMHG

## 2022-05-31 DIAGNOSIS — Z34.03 ENCOUNTER FOR SUPERVISION OF NORMAL FIRST PREGNANCY IN THIRD TRIMESTER: Primary | ICD-10-CM

## 2022-05-31 DIAGNOSIS — Z3A.32 32 WEEKS GESTATION OF PREGNANCY: ICD-10-CM

## 2022-05-31 DIAGNOSIS — O35.HXX0 CLUB FOOT OF FETUS AFFECTING ANTEPARTUM CARE OF MOTHER, SINGLE OR UNSPECIFIED FETUS: ICD-10-CM

## 2022-05-31 DIAGNOSIS — O99.019 ANEMIA AFFECTING PREGNANCY, ANTEPARTUM: ICD-10-CM

## 2022-05-31 PROCEDURE — 0502F SUBSEQUENT PRENATAL CARE: CPT | Performed by: NURSE PRACTITIONER

## 2022-05-31 NOTE — FLOWSHEET NOTE
No vaginal bleeding noted since admission. Discharge teaching provided. Pt verbalizes understanding. PTL precautions given. Pt ambulated out of unit with a strong, steady gait.

## 2022-05-31 NOTE — FLOWSHEET NOTE
Pt denies bleeding after using bathroom. States that the cyst on her labia is not open and does not see any drainage. Pt denies having a cyst before and denies a history of cold sores.

## 2022-05-31 NOTE — PROGRESS NOTES
Pt presents today for a routine prenatal visit. Pt denies vaginal bleeding, leaking of fluid or cramping. Pt states + fetal movement  Pt states that she was at the ED last night, she wiped once and saw blood but has not been seen again. Marija Goodrich is here for a return obstetrical visit. Today she is 32w6d weeks EGA. She is doing well now. Had sex 2 days prior. Goes to [facility] for US for baby and club foot. She  does not have vaginal bleeding, leaking of fluid, contractions. She does not have blurred vision, SOB, or increased swelling in legs or face. Pt does feel fetal movement regularly. O:   Vitals:    22 1618   BP: 126/84   Pulse: (!) 102   Weight: 288 lb (130.6 kg)     Pt is A&Ox3, in no acute distress. Normocephalic, atraumatic. PERRL. Resp even and non-labored. Skin pink, warm & dry. Gravid abdomen. DUTTA's well. Gait steady. See OB flowsheet. A: Normal IUP at 32w6d wks      Diagnosis Orders   1. Encounter for supervision of normal first pregnancy in third trimester     2. Club foot of fetus affecting antepartum care of mother, single or unspecified fetus     3. Anemia affecting pregnancy, antepartum     4. 32 weeks gestation of pregnancy         P:   Pt counseled on call for pain or if bleedign returns, PIH precautions, Kick count and  labor  Continue with routine prenatal care. RTC in 2 wk for prenatal visit    MEDICATIONS:  No orders of the defined types were placed in this encounter. ORDERS:  No orders of the defined types were placed in this encounter.

## 2022-05-31 NOTE — PATIENT INSTRUCTIONS
Patient Education        Weeks 30 to 28 of Your Pregnancy: Care Instructions  Overview     You've made it to the final months of your pregnancy! By now your baby isreally starting to look like a baby, with hair and plump skin. As you enter the final weeks of pregnancy, the reality of having a baby may start to set in. This is a good time to set up a safe nursery and find quality  if needed. Doing this stuff ahead of time will allow you to focus on caring for and enjoying your new baby. You may also want to take a tour of your hospital's labor and delivery unit. This will help you get a better idea ofwhat to expect while you're in the hospital.  During these last months, be sure to take good care of yourself. Pay attention to what your body needs. If your doctor says it's okay for you to work, don'tpush yourself too hard. If you haven't already had the Tdap shot during this pregnancy, talk to your doctor about getting it. It will help protect your  against pertussisinfection. Follow-up care is a key part of your treatment and safety. Be sure to make and go to all appointments, and call your doctor if you are having problems. It's also a good idea to know your test results and keep alist of the medicines you take. How can you care for yourself at home? Pay attention to your baby's movements   You should feel your baby move several times every day.  Your baby now turns less, and kicks and jabs more.  Your baby sleeps 20 to 45 minutes at a time and is more active at certain times of day.  If your doctor wants you to count your baby's kicks:  ? Empty your bladder, and lie on your side or relax in a comfortable chair. ? Write down your start time. ? Pay attention only to your baby's movements. Count any movement except hiccups. ? After you have counted 10 movements, write down your stop time. ? Write down how many minutes it took for your baby to move 10 times.   ? If an hour goes by and you have not recorded 10 movements, have something to eat or drink and then count for another hour. If you don't record at least 10 movements in the 2-hour period, call your doctor. Ease heartburn   Eat small, frequent meals.  Do not eat chocolate, peppermint, or very spicy foods. Avoid drinks with caffeine, such as coffee, tea, and sodas.  Avoid bending over or lying down after meals.  Take a short walk after you eat.  If heartburn is a problem at night, do not eat for 2 hours before bedtime.  Take antacids like Mylanta, Maalox, Rolaids, or Tums. Do not take antacids that have sodium bicarbonate. Care for varicose veins   Varicose veins are blood vessels that stretch out with the extra blood during pregnancy. Your legs may ache or throb. Most varicose veins will go away after the birth.  Avoid standing for long periods of time. Sit with your legs crossed at the ankles, not the knees.  Sit with your feet propped up.  Avoid tight clothing or stockings. Wear support hose.  Exercise regularly. Try walking for at least 30 minutes a day. Where can you learn more? Go to https://[email or web address]. org and sign in to your [employer] account. Enter O865 in the [facility] box to learn more about \"Weeks 30 to 32 of Your Pregnancy: Care Instructions. \"     If you do not have an account, please click on the \"Sign Up Now\" link. Current as of: June 16, 2021               Content Version: 13.2  © 2006-2022 [employer]. Care instructions adapted under license by [facility] ([facility]). If you have questions about a medical condition or this instruction, always ask your healthcare professional. [place] [address] any warranty or liability for your use of this information.

## 2022-06-13 ENCOUNTER — ROUTINE PRENATAL (OUTPATIENT)
Dept: OBGYN CLINIC | Age: 27
End: 2022-06-13
Payer: COMMERCIAL

## 2022-06-13 VITALS
HEART RATE: 93 BPM | BODY MASS INDEX: 44.48 KG/M2 | DIASTOLIC BLOOD PRESSURE: 70 MMHG | SYSTOLIC BLOOD PRESSURE: 112 MMHG | WEIGHT: 284 LBS

## 2022-06-13 DIAGNOSIS — Z23 NEED FOR TDAP VACCINATION: ICD-10-CM

## 2022-06-13 DIAGNOSIS — Z34.03 ENCOUNTER FOR SUPERVISION OF NORMAL FIRST PREGNANCY IN THIRD TRIMESTER: Primary | ICD-10-CM

## 2022-06-13 DIAGNOSIS — O99.210 OBESITY IN PREGNANCY: ICD-10-CM

## 2022-06-13 DIAGNOSIS — Z67.91 RH NEGATIVE, ANTEPARTUM: ICD-10-CM

## 2022-06-13 DIAGNOSIS — O26.899 RH NEGATIVE, ANTEPARTUM: ICD-10-CM

## 2022-06-13 DIAGNOSIS — Z3A.34 34 WEEKS GESTATION OF PREGNANCY: ICD-10-CM

## 2022-06-13 DIAGNOSIS — O35.HXX0 CLUB FOOT OF FETUS AFFECTING ANTEPARTUM CARE OF MOTHER, SINGLE OR UNSPECIFIED FETUS: ICD-10-CM

## 2022-06-13 PROCEDURE — 90715 TDAP VACCINE 7 YRS/> IM: CPT | Performed by: OBSTETRICS & GYNECOLOGY

## 2022-06-13 PROCEDURE — 90471 IMMUNIZATION ADMIN: CPT | Performed by: OBSTETRICS & GYNECOLOGY

## 2022-06-13 PROCEDURE — 0502F SUBSEQUENT PRENATAL CARE: CPT | Performed by: OBSTETRICS & GYNECOLOGY

## 2022-06-13 NOTE — PROGRESS NOTES
Silvia Dee is a 32 y.o. female 34w5d who presents for routine prenatal visit. The patient was seen and evaluated. There was positive fetal movements. No contractions, bleeding or leakage of fluid. Signs and symptoms of  labor as well as labor were reviewed. The S/S of Pre-Eclampsia were reviewed with the patient in detail. She is to report any of these if they occur. She currently denies any of these.   Silvia Dee is a 32 y.o. female with the following history as recorded in [facility]:  Patient Active Problem List    Diagnosis Date Noted    32 weeks gestation of pregnancy 2022    Rh negative, antepartum 2022    Obesity in pregnancy 2022     Current Outpatient Medications   Medication Sig Dispense Refill    ferrous sulfate (FE TABS) 325 (65 Fe) MG EC tablet Take 1 tablet by mouth 2 times daily 90 tablet 2    pantoprazole (PROTONIX) 40 MG tablet Take 1 tablet by mouth every morning (before breakfast) 30 tablet 3    loratadine (CLARITIN) 10 MG tablet Take 1 tablet by mouth daily 30 tablet 3    citalopram (CELEXA) 20 MG tablet Take 1 tablet by mouth daily 30 tablet 3    Prenatal MV-Min-Fe Fum-FA-DHA (PRENATAL 1 PO) Take by mouth      acetaminophen (TYLENOL) 325 MG tablet Take 650 mg by mouth every 6 hours as needed for Pain       calcium carbonate (TUMS) 500 MG chewable tablet Take 1 tablet by mouth daily       ondansetron (ZOFRAN ODT) 4 MG disintegrating tablet Take 1 tablet by mouth every 8 hours as needed for Nausea or Vomiting 40 tablet 2     No current facility-administered medications for this visit.      Allergies: Naltrexone-bupropion hcl er  Past Medical History:   Diagnosis Date    Depression     PCOS (polycystic ovarian syndrome)      Past Surgical History:   Procedure Laterality Date    CHOLECYSTECTOMY      LEG SURGERY Right     spider bite    TONSILLECTOMY       Family History   Problem Relation Age of Onset    Diabetes Father      Social History Tobacco Use    Smoking status: Never Smoker    Smokeless tobacco: Never Used   Substance Use Topics    Alcohol use: Not Currently         Mother's Prenatal Vitals  BP: 112/70  Weight: 284 lb (128.8 kg)  Heart Rate: 93  Patient Position: Sitting  Alb/Glu  Albumin: Negative  Glucose: Negative  Prenatal Fetal Information  Fundal Height (cm): 34 cm  Fetal Heart Rate: 143  Movement: Present  Physical Exam  Constitutional:       General: She is not in acute distress. Appearance: Normal appearance. She is not ill-appearing, toxic-appearing or diaphoretic. HENT:      Head: Normocephalic and atraumatic. Eyes:      Extraocular Movements: Extraocular movements intact. Pulmonary:      Effort: Pulmonary effort is normal. No respiratory distress. Abdominal:      Palpations: Abdomen is soft. Tenderness: There is no abdominal tenderness. Musculoskeletal:         General: No swelling or tenderness. Normal range of motion. Right lower leg: No edema. Left lower leg: No edema. Skin:     General: Skin is warm and dry. Findings: No rash. Neurological:      Mental Status: She is alert and oriented to person, place, and time. Psychiatric:         Attention and Perception: Attention and perception normal.         Mood and Affect: Mood and affect normal.         Speech: Speech normal.         Behavior: Behavior normal. Behavior is cooperative. Thought Content: Thought content normal.         Cognition and Memory: Cognition and memory normal.         Judgment: Judgment normal.           The patient had her 28 week labs completed. T-Dap Vaccine (27-36 weeks): completed    The patient was instructed on fetal kick counts and was given a kick sheet to complete every 8 hours.  She was instructed that the baby should move at a minimum of ten times within one hour after a meal. The patient was instructed to lay down on her left side twenty minutes after eating and count movements for up to one hour with a target value of ten movements. She was instructed to notify the office if she did not make that target after two attempts or if after any attempt there was less than four movements. The patient reports that the targets have been made Yes. Assessment:   Diagnosis Orders   1. Encounter for supervision of normal first pregnancy in third trimester     2. 34 weeks gestation of pregnancy     3. Obesity in pregnancy     4. Rh negative, antepartum     5. Club foot of fetus affecting antepartum care of mother, single or unspecified fetus     10. Need for Tdap vaccination  Tetanus-Diphth-Acell Pertussis (BOOSTRIX) injection 0.5 mL             Plan:  1. PTL precautions   2. Return in 2 weeks  3. TDAP today  I Rosio Pedersen, am scribing for and in the presence of Dr. Can Carter. I, Dr. Can Carter, personally performed the services described in this documentation as scribed by Rosio Pedersen in my presence, and it is both accurate and complete.

## 2022-06-13 NOTE — PROGRESS NOTES
Pt is here for prenatal appointment. She denies spotting, cramping, contractions. After obtaining consent, and per orders of Dr. Grover Mai, injection of Boostrix given in Right deltoid by Lynn Byrnes MA. Patient instructed to remain in clinic for 20 minutes afterwards, and to report any adverse reaction to me immediately.

## 2022-06-13 NOTE — PATIENT INSTRUCTIONS

## 2022-06-23 ENCOUNTER — ROUTINE PRENATAL (OUTPATIENT)
Dept: OBGYN CLINIC | Age: 27
End: 2022-06-23

## 2022-06-23 VITALS
SYSTOLIC BLOOD PRESSURE: 114 MMHG | DIASTOLIC BLOOD PRESSURE: 64 MMHG | BODY MASS INDEX: 44.64 KG/M2 | WEIGHT: 285 LBS | HEART RATE: 94 BPM

## 2022-06-23 DIAGNOSIS — Z34.03 ENCOUNTER FOR SUPERVISION OF NORMAL FIRST PREGNANCY IN THIRD TRIMESTER: ICD-10-CM

## 2022-06-23 DIAGNOSIS — Z3A.36 36 WEEKS GESTATION OF PREGNANCY: Primary | ICD-10-CM

## 2022-06-23 DIAGNOSIS — O35.HXX0 CLUB FOOT OF FETUS AFFECTING ANTEPARTUM CARE OF MOTHER, SINGLE OR UNSPECIFIED FETUS: ICD-10-CM

## 2022-06-23 DIAGNOSIS — O99.210 OBESITY IN PREGNANCY: ICD-10-CM

## 2022-06-23 PROCEDURE — 0502F SUBSEQUENT PRENATAL CARE: CPT | Performed by: OBSTETRICS & GYNECOLOGY

## 2022-06-23 NOTE — PATIENT INSTRUCTIONS

## 2022-06-23 NOTE — PROGRESS NOTES
Pt is here for prenatal appointment. She denies spotting, cramping, contractions. Pt feels like she might be having tamar vicente, she is having a lot more pressure in her \"lower stomach. \"

## 2022-06-23 NOTE — PROGRESS NOTES
IKimberly RN, am scribing for and in the presence of Dr.MIraiam Hicks 2022/9:50 AM/sign      Subjective:  Concepcion Bullard is here for a return obstetrical visit. Today she is 36w1d weeks EGA. She is having some tamar vicente. She  does not have vaginal bleeding, leaking, contractions, syncope, or headaches. Pt does feel fetal movement regularly. Objective: Mother's Prenatal Vitals  BP: 114/64  Weight: 285 lb (129.3 kg)  Heart Rate: 94  Patient Position: Sitting  Alb/Glu  Albumin: Negative  Glucose: Negative  Prenatal Fetal Information  Fetal Heart Rate: 162  Movement: Present  Cervical Exam  Dilation (cm): Closed  Effacement: 60  Station: -2  Station (Labor Curve Graph): 7  Presentation: Vertex  Dil/Eff/Sta  Dilation (cm): Closed  Effacement: 60  Station: -2  Pt is A&Ox3, in no acute distress. Normocephalic, atraumatic. PERRL. Resp even and non-labored. Skin pink, warm & dry. Gravid abdomen. DUTTA's well. Gait steady. GBS obtained   Assessment:    IUP at 36w1d wks      Diagnosis Orders   1. 36 weeks gestation of pregnancy  Culture, Strep B Screen, Vaginal/Rectal   2. Obesity in pregnancy     3. Encounter for supervision of normal first pregnancy in third trimester     4. Club foot of fetus affecting antepartum care of mother, single or unspecified fetus       Plan:  Pt counseled on balanced nutrition, adequate fluid intake, taking PNV daily, and exercise along with GHTN precautions, Kick count and  labor  Continue with routine prenatal care.  surveillance not indicated  Will vera IOL at 39 weeks  RTC in 1 wks for prenatal visit    MEDICATIONS:  No orders of the defined types were placed in this encounter. ORDERS:  Orders Placed This Encounter   Procedures    Culture, Strep B Screen, Vaginal/Rectal     I, Dr. Mil Landaverde, personally performed the services described in this documentation as scribed by Chari Rosa in my presence, and it is both accurate and complete.

## 2022-06-25 LAB — STREP B DNA AMP: DETECTED

## 2022-06-27 ENCOUNTER — ROUTINE PRENATAL (OUTPATIENT)
Dept: OBGYN CLINIC | Age: 27
End: 2022-06-27

## 2022-06-27 VITALS
DIASTOLIC BLOOD PRESSURE: 74 MMHG | BODY MASS INDEX: 44.64 KG/M2 | HEART RATE: 84 BPM | WEIGHT: 285 LBS | SYSTOLIC BLOOD PRESSURE: 102 MMHG

## 2022-06-27 DIAGNOSIS — Z34.03 ENCOUNTER FOR SUPERVISION OF NORMAL FIRST PREGNANCY IN THIRD TRIMESTER: Primary | ICD-10-CM

## 2022-06-27 DIAGNOSIS — O35.HXX0 CLUB FOOT OF FETUS AFFECTING ANTEPARTUM CARE OF MOTHER, SINGLE OR UNSPECIFIED FETUS: ICD-10-CM

## 2022-06-27 PROCEDURE — 0502F SUBSEQUENT PRENATAL CARE: CPT | Performed by: OBSTETRICS & GYNECOLOGY

## 2022-06-27 NOTE — PROGRESS NOTES
Pt is here for prenatal appointment. She denies spotting, cramping, contractions. Shona Rockwell is a 32 y.o. female 36w5d who presents for routine prenatal visit. The patient was seen and evaluated. There was normal fetal movements. No contractions, bleeding or leakage of fluid. Signs and symptoms of  labor as well as labor were reviewed. The S/S of Pre-Eclampsia were reviewed with the patient in detail. She is to report any of these if they occur. She currently denies any of these. Oneyda Butler is a 32 y.o. female with the following history as recorded in [facility]:  Patient Active Problem List    Diagnosis Date Noted    38 weeks gestation of pregnancy 2022    32 weeks gestation of pregnancy 2022    Rh negative, antepartum 2022    Obesity in pregnancy 2022     Current Outpatient Medications   Medication Sig Dispense Refill    ferrous sulfate (FE TABS) 325 (65 Fe) MG EC tablet Take 1 tablet by mouth 2 times daily 90 tablet 2    pantoprazole (PROTONIX) 40 MG tablet Take 1 tablet by mouth every morning (before breakfast) 30 tablet 3    loratadine (CLARITIN) 10 MG tablet Take 1 tablet by mouth daily 30 tablet 3    citalopram (CELEXA) 20 MG tablet Take 1 tablet by mouth daily 30 tablet 3    Prenatal MV-Min-Fe Fum-FA-DHA (PRENATAL 1 PO) Take by mouth      acetaminophen (TYLENOL) 325 MG tablet Take 650 mg by mouth every 6 hours as needed for Pain       calcium carbonate (TUMS) 500 MG chewable tablet Take 1 tablet by mouth daily       ondansetron (ZOFRAN ODT) 4 MG disintegrating tablet Take 1 tablet by mouth every 8 hours as needed for Nausea or Vomiting 40 tablet 2    ibuprofen (ADVIL;MOTRIN) 800 MG tablet Take 1 tablet by mouth every 8 hours as needed for Pain 90 tablet 0     No current facility-administered medications for this visit.      Facility-Administered Medications Ordered in Other Visits   Medication Dose Route Frequency Provider Last Rate Last Admin    rho(D) immune globulin (HYPERRHO S/D) injection 300 mcg  300 mcg IntraMUSCular Once Kristian Beckman MD         Allergies: Naltrexone-bupropion hcl er  Past Medical History:   Diagnosis Date    Depression     PCOS (polycystic ovarian syndrome)      Past Surgical History:   Procedure Laterality Date    CHOLECYSTECTOMY      LEG SURGERY Right     spider bite    TONSILLECTOMY       Family History   Problem Relation Age of Onset    Diabetes Father      Social History     Tobacco Use    Smoking status: Never Smoker    Smokeless tobacco: Never Used   Substance Use Topics    Alcohol use: Not Currently         Mother's Prenatal Vitals  BP: 102/74  Weight: 285 lb (129.3 kg)  Heart Rate: 84  Patient Position: Sitting  Alb/Glu  Albumin: Negative  Glucose: Negative  Prenatal Fetal Information  Fetal Heart Rate: 142  Movement: Present  Physical Exam  Constitutional:       General: She is not in acute distress. Appearance: Normal appearance. She is not ill-appearing, toxic-appearing or diaphoretic. HENT:      Head: Normocephalic and atraumatic. Eyes:      Extraocular Movements: Extraocular movements intact. Pulmonary:      Effort: Pulmonary effort is normal. No respiratory distress. Abdominal:      Palpations: Abdomen is soft. Tenderness: There is no abdominal tenderness. Musculoskeletal:         General: No swelling or tenderness. Normal range of motion. Right lower leg: No edema. Left lower leg: No edema. Skin:     General: Skin is warm and dry. Findings: No rash. Neurological:      Mental Status: She is alert and oriented to person, place, and time. Psychiatric:         Attention and Perception: Attention and perception normal.         Mood and Affect: Mood and affect normal.         Speech: Speech normal.         Behavior: Behavior normal. Behavior is cooperative. Thought Content:  Thought content normal.         Cognition and Memory: Cognition and memory INTERVAL HISTORY:  Seen at bedside with wife and comfortable.  No new changes.    No Known Allergies      VITAL SIGNS:  Vital Signs Last 24 Hrs  T(C): 37.1 (03 Dec 2018 04:55), Max: 37.1 (03 Dec 2018 04:55)  T(F): 98.7 (03 Dec 2018 04:55), Max: 98.7 (03 Dec 2018 04:55)  HR: 72 (03 Dec 2018 04:55) (72 - 80)  BP: 112/66 (03 Dec 2018 04:55) (102/58 - 112/66)  BP(mean): --  RR: 14 (03 Dec 2018 04:55) (14 - 19)  SpO2: 95% (03 Dec 2018 04:55) (95% - 99%)    PHYSICAL EXAMINATION:  General: Well-developed, well nourished, in no acute distress.  Eyes: Conjunctiva and sclera clear.  Neurologic:  - Mental Status:  Alert, awake, oriented to person, place, and time; Speech is normal; Affect is normal.  - Cranial Nerves: II-XI intact.  - Motor:  Strength is 5/5 throughout.  There is no pronator drift.  Normal muscle bulk and tone throughout.  - Reflexes:  2+ throughout  - Sensory:  Intact to light touch, pin prick, vibration, and joint-position sense throughout.  - Coordination:  No dysmetria/dysdiadochokinesis.    MEDS:  MEDICATIONS  (STANDING):  indomethacin Suppository 100 milliGRAM(s) Rectal once  methimazole 5 milliGRAM(s) Oral three times a day  metoprolol tartrate 50 milliGRAM(s) Oral two times a day    MEDICATIONS  (PRN):  LORazepam   Injectable 2 milliGRAM(s) IV Push once PRN seizures  naproxen 375 milliGRAM(s) Oral every 12 hours PRN Mild Pain (1 - 3)  oxyCODONE    IR 5 milliGRAM(s) Oral every 8 hours PRN Severe Pain (7 - 10)      LABS:                          14.2   5.2   )-----------( 249      ( 03 Dec 2018 05:58 )             43.2     12-03    140  |  102  |  11.0  ----------------------------<  108  3.7   |  26.0  |  0.65    Ca    9.3      03 Dec 2018 05:58  Phos  3.8     12-02  Mg     2.0     12-02    TPro  6.3<L>  /  Alb  3.7  /  TBili  1.6  /  DBili  x   /  AST  44<H>  /  ALT  231<H>  /  AlkPhos  111  12-02    LIVER FUNCTIONS - ( 02 Dec 2018 06:00 )  Alb: 3.7 g/dL / Pro: 6.3 g/dL / ALK PHOS: 111 U/L / ALT: 231 U/L / AST: 44 U/L / GGT: x               RADIOLOGY & ADDITIONAL STUDIES:    CT Head No Cont (11.30.18 @ 21:57) >  1)  unremarkable CT study of the brain.  2)  follow-up MR imaging may be considered for further assessment. normal.         Judgment: Judgment normal.           The patient had her 28 week labs completed. T-Dap Vaccine (27-36 weeks): completed    The patient was instructed on fetal kick counts and was given a kick sheet to complete every 8 hours. She was instructed that the baby should move at a minimum of ten times within one hour after a meal. The patient was instructed to lay down on her left side twenty minutes after eating and count movements for up to one hour with a target value of ten movements. She was instructed to notify the office if she did not make that target after two attempts or if after any attempt there was less than four movements. The patient reports that the targets have been made Yes. Assessment:   Diagnosis Orders   1. Encounter for supervision of normal first pregnancy in third trimester     2. Club foot of fetus affecting antepartum care of mother, single or unspecified fetus               Plan:  1. Labor precautions   2.  Return in 1 weeks

## 2022-07-06 ENCOUNTER — TELEPHONE (OUTPATIENT)
Dept: OBGYN CLINIC | Age: 27
End: 2022-07-06

## 2022-07-06 NOTE — TELEPHONE ENCOUNTER
Pt called stating she felt like baby's head was really low, extreme pressure, and more frequent/consistent contractions/tamar vicente. Pt has prenatal scheduled for Friday, told pt if she is having frequent contractions and experiencing extreme pressure she needs to go to L&D.

## 2022-07-07 ENCOUNTER — ROUTINE PRENATAL (OUTPATIENT)
Dept: OBGYN CLINIC | Age: 27
End: 2022-07-07

## 2022-07-07 VITALS
DIASTOLIC BLOOD PRESSURE: 68 MMHG | SYSTOLIC BLOOD PRESSURE: 110 MMHG | BODY MASS INDEX: 40.57 KG/M2 | WEIGHT: 259 LBS | HEART RATE: 82 BPM

## 2022-07-07 DIAGNOSIS — Z3A.38 38 WEEKS GESTATION OF PREGNANCY: ICD-10-CM

## 2022-07-07 DIAGNOSIS — Z34.03 ENCOUNTER FOR SUPERVISION OF NORMAL FIRST PREGNANCY IN THIRD TRIMESTER: Primary | ICD-10-CM

## 2022-07-07 PROCEDURE — 0502F SUBSEQUENT PRENATAL CARE: CPT | Performed by: NURSE PRACTITIONER

## 2022-07-07 NOTE — PROGRESS NOTES
Nixon San is here for a return obstetrical visit. Today she is 38w1d weeks EGA. She is doing well has no unusual complaints. She  does not havedoes not have vaginal bleeding, leaking of fluid. Having more contractions. She does not have blurred vision, SOB, or increased swelling in legs or face. Pt does feel fetal movement regularly. Reactive NST per guidelines. Occasional 10-20 second contractions noted. Discussed labor precautions with pt understanding. Pt requesting IOL- will discuss with Dr Myranda Henry. Objective: Mother's Prenatal Vitals  BP: 110/68  Weight: 259 lb (117.5 kg)  Heart Rate: 82  Patient Position: Sitting  Alb/Glu  Albumin: Trace  Glucose: Negative  Prenatal Fetal Information  Fetal Heart Rate: 158  Movement: Present  Pt is A&Ox3, in no acute distress. Normocephalic, atraumatic. PERRL. Resp even and non-labored. Skin pink, warm & dry. Gravid abdomen. DUTTA's well. Gait steady. Assessment:  IUP at 38w1d wks      Diagnosis Orders   1. Encounter for supervision of normal first pregnancy in third trimester     2. 38 weeks gestation of pregnancy       Plan:Pt counseled on labor precautions, GHTN precautions and Kick count  Continue with routine prenatal care. RTC in 4 wk for prenatal visit    MEDICATIONS:  No orders of the defined types were placed in this encounter. ORDERS:  No orders of the defined types were placed in this encounter.

## 2022-07-08 ENCOUNTER — TELEPHONE (OUTPATIENT)
Dept: OBGYN CLINIC | Age: 27
End: 2022-07-08

## 2022-07-08 NOTE — TELEPHONE ENCOUNTER
Valerie Culver called requesting status of their inducation date / time. .    Please return call to update her on status. The best time to call her to accommodate their needs is Anytime [phone]    Thank you.

## 2022-07-08 NOTE — TELEPHONE ENCOUNTER
Faheem Vegas requests that a nurse return their call in regards to her induction date. Please return her call. The best time to reach her is Anytime. Thank you.

## 2022-07-09 ENCOUNTER — HOSPITAL ENCOUNTER (INPATIENT)
Age: 27
LOS: 2 days | Discharge: HOME | End: 2022-07-11
Attending: OBSTETRICS & GYNECOLOGY | Admitting: OBSTETRICS & GYNECOLOGY
Payer: COMMERCIAL

## 2022-07-09 PROBLEM — Z3A.38 38 WEEKS GESTATION OF PREGNANCY: Status: ACTIVE | Noted: 2022-07-09

## 2022-07-09 LAB
ABO/RH: NORMAL
AMPHETAMINE SCREEN, URINE: NEGATIVE
ANTIBODY SCREEN: NORMAL
BARBITURATE SCREEN URINE: NEGATIVE
BENZODIAZEPINE SCREEN, URINE: NEGATIVE
BUPRENORPHINE URINE: NEGATIVE
CANNABINOID SCREEN URINE: NEGATIVE
COCAINE METABOLITE SCREEN URINE: NEGATIVE
HCT VFR BLD CALC: 33.2 % (ref 37–47)
HEMOGLOBIN: 10.3 G/DL (ref 12–16)
Lab: NORMAL
MCH RBC QN AUTO: 24.2 PG (ref 27–31)
MCHC RBC AUTO-ENTMCNC: 31 G/DL (ref 33–37)
MCV RBC AUTO: 77.9 FL (ref 81–99)
METHADONE SCREEN, URINE: NEGATIVE
METHAMPHETAMINE, URINE: NEGATIVE
OPIATE SCREEN URINE: NEGATIVE
OXYCODONE URINE: NEGATIVE
PDW BLD-RTO: 14.8 % (ref 11.5–14.5)
PHENCYCLIDINE SCREEN URINE: NEGATIVE
PLATELET # BLD: 213 K/UL (ref 130–400)
PMV BLD AUTO: 10.2 FL (ref 9.4–12.3)
PROPOXYPHENE SCREEN: NEGATIVE
RBC # BLD: 4.26 M/UL (ref 4.2–5.4)
SARS-COV-2, NAAT: NOT DETECTED
TRICYCLIC, URINE: NEGATIVE
WBC # BLD: 11.1 K/UL (ref 4.8–10.8)

## 2022-07-09 PROCEDURE — 86592 SYPHILIS TEST NON-TREP QUAL: CPT

## 2022-07-09 PROCEDURE — 85027 COMPLETE CBC AUTOMATED: CPT

## 2022-07-09 PROCEDURE — 6370000000 HC RX 637 (ALT 250 FOR IP): Performed by: OBSTETRICS & GYNECOLOGY

## 2022-07-09 PROCEDURE — 80306 DRUG TEST PRSMV INSTRMNT: CPT

## 2022-07-09 PROCEDURE — 36415 COLL VENOUS BLD VENIPUNCTURE: CPT

## 2022-07-09 PROCEDURE — 87635 SARS-COV-2 COVID-19 AMP PRB: CPT

## 2022-07-09 PROCEDURE — 1220000000 HC SEMI PRIVATE OB R&B

## 2022-07-09 PROCEDURE — 86901 BLOOD TYPING SEROLOGIC RH(D): CPT

## 2022-07-09 PROCEDURE — 86850 RBC ANTIBODY SCREEN: CPT

## 2022-07-09 PROCEDURE — 86900 BLOOD TYPING SEROLOGIC ABO: CPT

## 2022-07-09 RX ORDER — SODIUM CHLORIDE, SODIUM LACTATE, POTASSIUM CHLORIDE, CALCIUM CHLORIDE 600; 310; 30; 20 MG/100ML; MG/100ML; MG/100ML; MG/100ML
INJECTION, SOLUTION INTRAVENOUS CONTINUOUS
Status: DISCONTINUED | OUTPATIENT
Start: 2022-07-09 | End: 2022-07-11

## 2022-07-09 RX ORDER — SODIUM CHLORIDE, SODIUM LACTATE, POTASSIUM CHLORIDE, AND CALCIUM CHLORIDE .6; .31; .03; .02 G/100ML; G/100ML; G/100ML; G/100ML
500 INJECTION, SOLUTION INTRAVENOUS PRN
Status: DISCONTINUED | OUTPATIENT
Start: 2022-07-09 | End: 2022-07-11 | Stop reason: HOSPADM

## 2022-07-09 RX ORDER — SODIUM CHLORIDE 0.9 % (FLUSH) 0.9 %
5-40 SYRINGE (ML) INJECTION EVERY 12 HOURS SCHEDULED
Status: DISCONTINUED | OUTPATIENT
Start: 2022-07-09 | End: 2022-07-11

## 2022-07-09 RX ORDER — SODIUM CHLORIDE 0.9 % (FLUSH) 0.9 %
5-40 SYRINGE (ML) INJECTION PRN
Status: DISCONTINUED | OUTPATIENT
Start: 2022-07-09 | End: 2022-07-11 | Stop reason: HOSPADM

## 2022-07-09 RX ORDER — SODIUM CHLORIDE, SODIUM LACTATE, POTASSIUM CHLORIDE, AND CALCIUM CHLORIDE .6; .31; .03; .02 G/100ML; G/100ML; G/100ML; G/100ML
1000 INJECTION, SOLUTION INTRAVENOUS PRN
Status: DISCONTINUED | OUTPATIENT
Start: 2022-07-09 | End: 2022-07-11 | Stop reason: HOSPADM

## 2022-07-09 RX ORDER — SODIUM CHLORIDE 9 MG/ML
25 INJECTION, SOLUTION INTRAVENOUS PRN
Status: DISCONTINUED | OUTPATIENT
Start: 2022-07-09 | End: 2022-07-11

## 2022-07-09 RX ADMIN — DINOPROSTONE 10 MG: 10 INSERT VAGINAL at 19:40

## 2022-07-10 ENCOUNTER — ANESTHESIA EVENT (OUTPATIENT)
Dept: LABOR AND DELIVERY | Age: 27
End: 2022-07-10
Payer: COMMERCIAL

## 2022-07-10 ENCOUNTER — ANESTHESIA (OUTPATIENT)
Dept: LABOR AND DELIVERY | Age: 27
End: 2022-07-10
Payer: COMMERCIAL

## 2022-07-10 PROCEDURE — 2500000003 HC RX 250 WO HCPCS: Performed by: NURSE ANESTHETIST, CERTIFIED REGISTERED

## 2022-07-10 PROCEDURE — 6360000002 HC RX W HCPCS: Performed by: OBSTETRICS & GYNECOLOGY

## 2022-07-10 PROCEDURE — 2500000003 HC RX 250 WO HCPCS: Performed by: OBSTETRICS & GYNECOLOGY

## 2022-07-10 PROCEDURE — 2580000003 HC RX 258: Performed by: OBSTETRICS & GYNECOLOGY

## 2022-07-10 PROCEDURE — 1220000000 HC SEMI PRIVATE OB R&B

## 2022-07-10 PROCEDURE — 59050 FETAL MONITOR W/REPORT: CPT

## 2022-07-10 PROCEDURE — 3700000025 EPIDURAL BLOCK: Performed by: NURSE ANESTHETIST, CERTIFIED REGISTERED

## 2022-07-10 RX ORDER — LIDOCAINE HYDROCHLORIDE 10 MG/ML
INJECTION, SOLUTION INFILTRATION; PERINEURAL PRN
Status: DISCONTINUED | OUTPATIENT
Start: 2022-07-10 | End: 2022-07-11 | Stop reason: SDUPTHER

## 2022-07-10 RX ORDER — ROPIVACAINE HYDROCHLORIDE 2 MG/ML
INJECTION, SOLUTION EPIDURAL; INFILTRATION; PERINEURAL CONTINUOUS PRN
Status: DISCONTINUED | OUTPATIENT
Start: 2022-07-10 | End: 2022-07-11 | Stop reason: SDUPTHER

## 2022-07-10 RX ORDER — ONDANSETRON 2 MG/ML
4 INJECTION INTRAMUSCULAR; INTRAVENOUS EVERY 6 HOURS PRN
Status: DISCONTINUED | OUTPATIENT
Start: 2022-07-10 | End: 2022-07-11 | Stop reason: HOSPADM

## 2022-07-10 RX ORDER — LIDOCAINE HYDROCHLORIDE AND EPINEPHRINE 15; 5 MG/ML; UG/ML
INJECTION, SOLUTION EPIDURAL PRN
Status: DISCONTINUED | OUTPATIENT
Start: 2022-07-10 | End: 2022-07-11 | Stop reason: SDUPTHER

## 2022-07-10 RX ORDER — ROPIVACAINE HYDROCHLORIDE 2 MG/ML
INJECTION, SOLUTION EPIDURAL; INFILTRATION; PERINEURAL PRN
Status: DISCONTINUED | OUTPATIENT
Start: 2022-07-10 | End: 2022-07-11 | Stop reason: SDUPTHER

## 2022-07-10 RX ORDER — ONDANSETRON 2 MG/ML
4 INJECTION INTRAMUSCULAR; INTRAVENOUS EVERY 6 HOURS PRN
Status: DISCONTINUED | OUTPATIENT
Start: 2022-07-10 | End: 2022-07-11 | Stop reason: SDUPTHER

## 2022-07-10 RX ORDER — DOCUSATE SODIUM 100 MG/1
100 CAPSULE, LIQUID FILLED ORAL 2 TIMES DAILY
Status: DISCONTINUED | OUTPATIENT
Start: 2022-07-10 | End: 2022-07-11

## 2022-07-10 RX ADMIN — BUTORPHANOL TARTRATE 1 MG: 2 INJECTION, SOLUTION INTRAMUSCULAR; INTRAVENOUS at 11:04

## 2022-07-10 RX ADMIN — Medication 2.5 MILLION UNITS: at 15:45

## 2022-07-10 RX ADMIN — PENICILLIN G SODIUM 5 MILLION UNITS: 5000000 INJECTION, POWDER, FOR SOLUTION INTRAMUSCULAR; INTRAVENOUS at 11:04

## 2022-07-10 RX ADMIN — LIDOCAINE HYDROCHLORIDE 3 ML: 10 INJECTION, SOLUTION INFILTRATION; PERINEURAL at 19:27

## 2022-07-10 RX ADMIN — BUTORPHANOL TARTRATE 1 MG: 2 INJECTION, SOLUTION INTRAMUSCULAR; INTRAVENOUS at 01:06

## 2022-07-10 RX ADMIN — LIDOCAINE HYDROCHLORIDE AND EPINEPHRINE 3 ML: 15; 5 INJECTION, SOLUTION EPIDURAL at 19:31

## 2022-07-10 RX ADMIN — Medication 2 MILLI-UNITS/MIN: at 12:35

## 2022-07-10 RX ADMIN — Medication 10 ML: at 11:44

## 2022-07-10 RX ADMIN — BUTORPHANOL TARTRATE 1 MG: 2 INJECTION, SOLUTION INTRAMUSCULAR; INTRAVENOUS at 16:43

## 2022-07-10 RX ADMIN — ROPIVACAINE HYDROCHLORIDE 5 ML: 2 INJECTION, SOLUTION EPIDURAL; INFILTRATION; PERINEURAL at 19:36

## 2022-07-10 RX ADMIN — ROPIVACAINE HYDROCHLORIDE 12 ML/HR: 2 INJECTION, SOLUTION EPIDURAL; INFILTRATION; PERINEURAL at 19:42

## 2022-07-10 RX ADMIN — ONDANSETRON HYDROCHLORIDE 4 MG: 2 SOLUTION INTRAMUSCULAR; INTRAVENOUS at 21:12

## 2022-07-10 RX ADMIN — Medication 2.5 MILLION UNITS: at 20:15

## 2022-07-10 RX ADMIN — ROPIVACAINE HYDROCHLORIDE 3 ML: 2 INJECTION, SOLUTION EPIDURAL; INFILTRATION; PERINEURAL at 19:41

## 2022-07-10 RX ADMIN — SODIUM CHLORIDE, POTASSIUM CHLORIDE, SODIUM LACTATE AND CALCIUM CHLORIDE: 600; 310; 30; 20 INJECTION, SOLUTION INTRAVENOUS at 11:03

## 2022-07-10 ASSESSMENT — PAIN - FUNCTIONAL ASSESSMENT
PAIN_FUNCTIONAL_ASSESSMENT: ACTIVITIES ARE NOT PREVENTED

## 2022-07-10 ASSESSMENT — PAIN SCALES - WONG BAKER: WONGBAKER_NUMERICALRESPONSE: 2

## 2022-07-10 ASSESSMENT — PAIN DESCRIPTION - DESCRIPTORS
DESCRIPTORS: CRAMPING
DESCRIPTORS: CRAMPING
DESCRIPTORS: CRAMPING;ACHING;DISCOMFORT

## 2022-07-10 ASSESSMENT — PAIN DESCRIPTION - ORIENTATION
ORIENTATION: MID
ORIENTATION: LOWER
ORIENTATION: MID

## 2022-07-10 ASSESSMENT — PAIN SCALES - GENERAL
PAINLEVEL_OUTOF10: 2
PAINLEVEL_OUTOF10: 6
PAINLEVEL_OUTOF10: 9
PAINLEVEL_OUTOF10: 7

## 2022-07-10 ASSESSMENT — PAIN DESCRIPTION - LOCATION
LOCATION: ABDOMEN;BACK
LOCATION: ABDOMEN;BACK
LOCATION: ABDOMEN;VAGINA

## 2022-07-10 NOTE — FLOWSHEET NOTE
Pt transferred to labor room 222, ambulatory, steady gait noted. Belongings to room per S.O. Pt alert and oriented. Pt ambulating around room at this time and changed into her own, personal gown. Denies current needs at present.

## 2022-07-11 VITALS
OXYGEN SATURATION: 97 % | DIASTOLIC BLOOD PRESSURE: 85 MMHG | RESPIRATION RATE: 16 BRPM | BODY MASS INDEX: 40.65 KG/M2 | WEIGHT: 259 LBS | SYSTOLIC BLOOD PRESSURE: 134 MMHG | HEART RATE: 82 BPM | HEIGHT: 67 IN | TEMPERATURE: 97.1 F

## 2022-07-11 LAB
BASE EXCESS ARTERIAL: -8.7 MMOL/L (ref -2–2)
BASE EXCESS VENOUS: -10 MMOL/L
BASOPHILS ABSOLUTE: 0 K/UL (ref 0–0.2)
BASOPHILS RELATIVE PERCENT: 0.2 % (ref 0–1)
CARBOXYHEMOGLOBIN ARTERIAL: 1.2 % (ref 0–5)
CARBOXYHEMOGLOBIN: 2 %
EOSINOPHILS ABSOLUTE: 0 K/UL (ref 0–0.6)
EOSINOPHILS RELATIVE PERCENT: 0 % (ref 0–5)
HCO3 ARTERIAL: 20.2 MMOL/L (ref 22–26)
HCO3 VENOUS: 17 MMOL/L (ref 23–29)
HCT VFR BLD CALC: 33.3 % (ref 37–47)
HEMOGLOBIN, ART, EXTENDED: 17.7 G/DL (ref 12–16)
HEMOGLOBIN: 10.5 G/DL (ref 12–16)
IMMATURE GRANULOCYTES #: 0.1 K/UL
LYMPHOCYTES ABSOLUTE: 1.3 K/UL (ref 1.1–4.5)
LYMPHOCYTES RELATIVE PERCENT: 7.7 % (ref 20–40)
MCH RBC QN AUTO: 24.4 PG (ref 27–31)
MCHC RBC AUTO-ENTMCNC: 31.5 G/DL (ref 33–37)
MCV RBC AUTO: 77.4 FL (ref 81–99)
METHEMOGLOBIN ARTERIAL: 1.7 %
METHEMOGLOBIN VENOUS: 1.2 %
MONOCYTES ABSOLUTE: 1.1 K/UL (ref 0–0.9)
MONOCYTES RELATIVE PERCENT: 6.3 % (ref 0–10)
NEUTROPHILS ABSOLUTE: 14.9 K/UL (ref 1.5–7.5)
NEUTROPHILS RELATIVE PERCENT: 85.2 % (ref 50–65)
O2 CONTENT ARTERIAL: 5.4 ML/DL
O2 CONTENT, VEN: 12 ML/DL
O2 SAT, ARTERIAL: 21.9 %
O2 SAT, VEN: 47 %
O2 THERAPY: ABNORMAL
PCO2 ARTERIAL: 53 MMHG (ref 35–45)
PCO2, VEN: 39 MMHG (ref 40–50)
PDW BLD-RTO: 15.1 % (ref 11.5–14.5)
PH ARTERIAL: 7.19 (ref 7.35–7.45)
PH VENOUS: 7.25 (ref 7.35–7.45)
PLATELET # BLD: 224 K/UL (ref 130–400)
PMV BLD AUTO: 10.6 FL (ref 9.4–12.3)
PO2 ARTERIAL: 13 MMHG (ref 80–100)
PO2, VEN: 22 MMHG
POTASSIUM, WHOLE BLOOD: 4.4
RBC # BLD: 4.3 M/UL (ref 4.2–5.4)
RPR: NORMAL
SAMPLE SOURCE: ABNORMAL
WBC # BLD: 17.5 K/UL (ref 4.8–10.8)

## 2022-07-11 PROCEDURE — 82803 BLOOD GASES ANY COMBINATION: CPT

## 2022-07-11 PROCEDURE — 36600 WITHDRAWAL OF ARTERIAL BLOOD: CPT

## 2022-07-11 PROCEDURE — S9443 LACTATION CLASS: HCPCS

## 2022-07-11 PROCEDURE — 6360000002 HC RX W HCPCS: Performed by: OBSTETRICS & GYNECOLOGY

## 2022-07-11 PROCEDURE — 6370000000 HC RX 637 (ALT 250 FOR IP): Performed by: OBSTETRICS & GYNECOLOGY

## 2022-07-11 PROCEDURE — 59400 OBSTETRICAL CARE: CPT | Performed by: OBSTETRICS & GYNECOLOGY

## 2022-07-11 PROCEDURE — 86901 BLOOD TYPING SEROLOGIC RH(D): CPT

## 2022-07-11 PROCEDURE — 99024 POSTOP FOLLOW-UP VISIT: CPT | Performed by: NURSE PRACTITIONER

## 2022-07-11 PROCEDURE — 85461 HEMOGLOBIN FETAL: CPT

## 2022-07-11 PROCEDURE — 88307 TISSUE EXAM BY PATHOLOGIST: CPT

## 2022-07-11 PROCEDURE — 36415 COLL VENOUS BLD VENIPUNCTURE: CPT

## 2022-07-11 PROCEDURE — 6360000002 HC RX W HCPCS: Performed by: NURSE ANESTHETIST, CERTIFIED REGISTERED

## 2022-07-11 PROCEDURE — 86900 BLOOD TYPING SEROLOGIC ABO: CPT

## 2022-07-11 PROCEDURE — 7200000001 HC VAGINAL DELIVERY

## 2022-07-11 PROCEDURE — 85025 COMPLETE CBC W/AUTO DIFF WBC: CPT

## 2022-07-11 RX ORDER — DOCUSATE SODIUM 100 MG/1
100 CAPSULE, LIQUID FILLED ORAL 2 TIMES DAILY
Status: DISCONTINUED | OUTPATIENT
Start: 2022-07-11 | End: 2022-07-11 | Stop reason: HOSPADM

## 2022-07-11 RX ORDER — OXYTOCIN 10 [USP'U]/ML
10 INJECTION, SOLUTION INTRAMUSCULAR; INTRAVENOUS ONCE
Status: COMPLETED | OUTPATIENT
Start: 2022-07-11 | End: 2022-07-11

## 2022-07-11 RX ORDER — IBUPROFEN 400 MG/1
800 TABLET ORAL EVERY 8 HOURS PRN
Status: DISCONTINUED | OUTPATIENT
Start: 2022-07-11 | End: 2022-07-11 | Stop reason: HOSPADM

## 2022-07-11 RX ORDER — TRANEXAMIC ACID 100 MG/ML
15 INJECTION, SOLUTION INTRAVENOUS
Status: DISCONTINUED | OUTPATIENT
Start: 2022-07-11 | End: 2022-07-11 | Stop reason: HOSPADM

## 2022-07-11 RX ORDER — FENTANYL CITRATE 50 UG/ML
INJECTION, SOLUTION INTRAMUSCULAR; INTRAVENOUS PRN
Status: DISCONTINUED | OUTPATIENT
Start: 2022-07-11 | End: 2022-07-11 | Stop reason: SDUPTHER

## 2022-07-11 RX ORDER — SODIUM CHLORIDE 0.9 % (FLUSH) 0.9 %
5-40 SYRINGE (ML) INJECTION EVERY 12 HOURS SCHEDULED
Status: DISCONTINUED | OUTPATIENT
Start: 2022-07-11 | End: 2022-07-11 | Stop reason: HOSPADM

## 2022-07-11 RX ORDER — ACETAMINOPHEN 325 MG/1
650 TABLET ORAL EVERY 4 HOURS PRN
Status: DISCONTINUED | OUTPATIENT
Start: 2022-07-11 | End: 2022-07-11 | Stop reason: HOSPADM

## 2022-07-11 RX ORDER — MISOPROSTOL 200 UG/1
800 TABLET ORAL ONCE
Status: COMPLETED | OUTPATIENT
Start: 2022-07-11 | End: 2022-07-11

## 2022-07-11 RX ORDER — SODIUM CHLORIDE 0.9 % (FLUSH) 0.9 %
5-40 SYRINGE (ML) INJECTION PRN
Status: DISCONTINUED | OUTPATIENT
Start: 2022-07-11 | End: 2022-07-11 | Stop reason: HOSPADM

## 2022-07-11 RX ORDER — SODIUM CHLORIDE 9 MG/ML
INJECTION, SOLUTION INTRAVENOUS PRN
Status: DISCONTINUED | OUTPATIENT
Start: 2022-07-11 | End: 2022-07-11

## 2022-07-11 RX ORDER — SODIUM CHLORIDE, SODIUM LACTATE, POTASSIUM CHLORIDE, CALCIUM CHLORIDE 600; 310; 30; 20 MG/100ML; MG/100ML; MG/100ML; MG/100ML
INJECTION, SOLUTION INTRAVENOUS CONTINUOUS
Status: DISCONTINUED | OUTPATIENT
Start: 2022-07-11 | End: 2022-07-11

## 2022-07-11 RX ORDER — IBUPROFEN 800 MG/1
800 TABLET ORAL EVERY 8 HOURS PRN
Qty: 90 TABLET | Refills: 0 | Status: SHIPPED | OUTPATIENT
Start: 2022-07-11

## 2022-07-11 RX ADMIN — MISOPROSTOL 800 MCG: 200 TABLET ORAL at 06:09

## 2022-07-11 RX ADMIN — Medication 2.5 MILLION UNITS: at 00:28

## 2022-07-11 RX ADMIN — IBUPROFEN 800 MG: 400 TABLET, FILM COATED ORAL at 07:12

## 2022-07-11 RX ADMIN — FENTANYL CITRATE 100 MCG: 50 INJECTION, SOLUTION INTRAMUSCULAR; INTRAVENOUS at 01:10

## 2022-07-11 RX ADMIN — Medication 87.3 MILLI-UNITS/MIN: at 05:39

## 2022-07-11 RX ADMIN — ROPIVACAINE HYDROCHLORIDE 12 ML/HR: 2 INJECTION, SOLUTION EPIDURAL; INFILTRATION; PERINEURAL at 01:10

## 2022-07-11 RX ADMIN — OXYTOCIN 10 UNITS: 10 INJECTION, SOLUTION INTRAMUSCULAR; INTRAVENOUS at 06:08

## 2022-07-11 RX ADMIN — IBUPROFEN 800 MG: 400 TABLET, FILM COATED ORAL at 14:58

## 2022-07-11 ASSESSMENT — PAIN SCALES - GENERAL
PAINLEVEL_OUTOF10: 3
PAINLEVEL_OUTOF10: 5

## 2022-07-11 ASSESSMENT — PAIN DESCRIPTION - DESCRIPTORS
DESCRIPTORS: CRAMPING
DESCRIPTORS: CRAMPING

## 2022-07-11 ASSESSMENT — PAIN DESCRIPTION - ORIENTATION: ORIENTATION: LOWER

## 2022-07-11 ASSESSMENT — PAIN DESCRIPTION - LOCATION
LOCATION: ABDOMEN;BACK
LOCATION: ABDOMEN

## 2022-07-11 ASSESSMENT — PAIN - FUNCTIONAL ASSESSMENT: PAIN_FUNCTIONAL_ASSESSMENT: ACTIVITIES ARE NOT PREVENTED

## 2022-07-11 NOTE — ANESTHESIA PRE PROCEDURE
Department of Anesthesiology  Preprocedure Note       Name:  Malissa Zamarripa   Age:  32 y.o.  :  1995                                          MRN:  [id number]         Date:  7/10/2022      Surgeon: * No surgeons listed *    Procedure: * No procedures listed *    Medications prior to admission:   Prior to Admission medications    Medication Sig Start Date End Date Taking?  Authorizing Provider   ferrous sulfate (FE TABS) 325 (65 Fe) MG EC tablet Take 1 tablet by mouth 2 times daily 5/3/22   Andreas Carter MD   pantoprazole (PROTONIX) 40 MG tablet Take 1 tablet by mouth every morning (before breakfast) 22   Andreas Carter MD   loratadine (CLARITIN) 10 MG tablet Take 1 tablet by mouth daily 22   Andreas Carter MD   citalopram (CELEXA) 20 MG tablet Take 1 tablet by mouth daily 1/3/22   Andreas Carter MD   Prenatal MV-Min-Fe Fum-FA-DHA (PRENATAL 1 PO) Take by mouth    Historical Provider, MD   acetaminophen (TYLENOL) 325 MG tablet Take 650 mg by mouth every 6 hours as needed for Pain     Historical Provider, MD   calcium carbonate (TUMS) 500 MG chewable tablet Take 1 tablet by mouth daily     Historical Provider, MD   ondansetron (ZOFRAN ODT) 4 MG disintegrating tablet Take 1 tablet by mouth every 8 hours as needed for Nausea or Vomiting 21   GARY Martinez       Current medications:    Current Facility-Administered Medications   Medication Dose Route Frequency Provider Last Rate Last Admin    penicillin G potassium in d5w IVPB 2.5 Million Units  2.5 Million Units IntraVENous Q4H Andreas Carter  mL/hr at 07/10/22 1545 2.5 Million Units at 07/10/22 1545    butorphanol (STADOL) injection 1 mg  1 mg IntraVENous Q3H PRN Andreas Carter MD   1 mg at 07/10/22 1643    lactated ringers infusion   IntraVENous Continuous Andreas Carter  mL/hr at 07/10/22 1103 New Bag at 07/10/22 1103    lactated ringers bolus  500 mL IntraVENous PRN Rowena Chavez D Lux Go MD        Or    lactated ringers bolus  1,000 mL IntraVENous PRN Gerber Esposito MD        sodium chloride flush 0.9 % injection 5-40 mL  5-40 mL IntraVENous 2 times per day Gerber Esposito MD   10 mL at 07/10/22 1144    sodium chloride flush 0.9 % injection 5-40 mL  5-40 mL IntraVENous PRN Gerber Esposito MD        0.9 % sodium chloride infusion  25 mL IntraVENous PRN Gerber Esposito MD        oxytocin (PITOCIN) 30 units in 500 mL infusion  1 ольга-units/min IntraVENous Continuous Gerber Esposito MD 2 mL/hr at 07/10/22 1235 2 ольга-units/min at 07/10/22 1235    oxytocin (PITOCIN) 30 units in 500 mL infusion  87.3 ольга-units/min IntraVENous Continuous PRN Gerber Esposito MD        And    oxytocin (PITOCIN) 10 unit bolus from the bag  10 Units IntraVENous PRN Gerber Esposito MD           Allergies:     Allergies   Allergen Reactions    Naltrexone-Bupropion Hcl Er Nausea Only     Nausea       Problem List:    Patient Active Problem List   Diagnosis Code    Rh negative, antepartum O26.899, Z67.91    Obesity in pregnancy O99.210    32 weeks gestation of pregnancy Z3A.32    38 weeks gestation of pregnancy Z3A.38       Past Medical History:        Diagnosis Date    Depression     PCOS (polycystic ovarian syndrome)        Past Surgical History:        Procedure Laterality Date    CHOLECYSTECTOMY      LEG SURGERY Right     spider bite    TONSILLECTOMY         Social History:    Social History     Tobacco Use    Smoking status: Never Smoker    Smokeless tobacco: Never Used   Substance Use Topics    Alcohol use: Not Currently                                Counseling given: Not Answered      Vital Signs (Current):   Vitals:    07/10/22 0101 07/10/22 0201 07/10/22 0301 07/10/22 1602   BP: 129/72 116/61 (!) 108/55 135/76   Pulse: 74 75 72 74   Resp:       Temp:       TempSrc:       SpO2:       Weight:       Height: BP Readings from Last 3 Encounters:   07/10/22 135/76   07/07/22 110/68   06/27/22 102/74       NPO Status:                                                                                 BMI:   Wt Readings from Last 3 Encounters:   07/09/22 259 lb (117.5 kg)   07/07/22 259 lb (117.5 kg)   06/27/22 285 lb (129.3 kg)     Body mass index is 40.57 kg/m². CBC:   Lab Results   Component Value Date/Time    WBC 11.1 07/09/2022 07:09 PM    RBC 4.26 07/09/2022 07:09 PM    HGB 10.3 07/09/2022 07:09 PM    HCT 33.2 07/09/2022 07:09 PM    MCV 77.9 07/09/2022 07:09 PM    RDW 14.8 07/09/2022 07:09 PM     07/09/2022 07:09 PM       CMP: No results found for: NA, K, CL, CO2, BUN, CREATININE, GFRAA, AGRATIO, LABGLOM, GLUCOSE, GLU, PROT, CALCIUM, BILITOT, ALKPHOS, AST, ALT    POC Tests: No results for input(s): POCGLU, POCNA, POCK, POCCL, POCBUN, POCHEMO, POCHCT in the last 72 hours.     Coags: No results found for: PROTIME, INR, APTT    HCG (If Applicable): No results found for: PREGTESTUR, PREGSERUM, HCG, HCGQUANT     ABGs: No results found for: PHART, PO2ART, FWS9JQB, HXT3EVM, BEART, N2DYHUCO     Type & Screen (If Applicable):  No results found for: LABABO, LABRH    Drug/Infectious Status (If Applicable):  No results found for: HIV, HEPCAB    COVID-19 Screening (If Applicable):   Lab Results   Component Value Date/Time    COVID19 Not Detected 07/09/2022 07:02 PM           Anesthesia Evaluation  Patient summary reviewed and Nursing notes reviewed  Airway: Mallampati: II  TM distance: >3 FB   Neck ROM: full  Mouth opening: > = 3 FB   Dental: normal exam         Pulmonary:Negative Pulmonary ROS and normal exam                               Cardiovascular:Negative CV ROS                      Neuro/Psych:   Negative Neuro/Psych ROS  (+) psychiatric history:            GI/Hepatic/Renal: Neg GI/Hepatic/Renal ROS            Endo/Other: Negative Endo/Other ROS                    Abdominal:   (+) obese, Vascular: negative vascular ROS. Other Findings:           Anesthesia Plan      epidural     ASA 2             Anesthetic plan and risks discussed with patient.                         GARY Stephens - CRNA   7/10/2022

## 2022-07-11 NOTE — FLOWSHEET NOTE
Pt legs still tingling. Pt was able to get up with the assist of the scooby steady. Pt voided and faye care completed at this time. Pt moved to her postpartum room and assisted back to bed.  Pt has no concerns at this time

## 2022-07-11 NOTE — LACTATION NOTE
Baby shipped to [facility]. Encouraged mother to start pumping. Mother has been pumping with her electric pump. Instructions for set up and cleaning given. Hand expression, breast compression encouraged to increase milk transfer while pumping. Instructed to pump for 15 mins every 2-3 hours and to give EBM to nursery nurse so they can date and time EBM and place in freezer. Information discussing the benefits of colostrum given. Supply and demand discussed. Mother understands and agrees with feeding plan. Encouragement and support provided.

## 2022-07-11 NOTE — DISCHARGE SUMMARY
Subjective:     Postpartum Day 1: Vaginal Delivery    Patient is a  The patient feels well. The patient denies emotional concerns. Pain is well controlled with current medications. The baby is at [address] and pt is wanting early discharge to go be with baby. Is stable, recent cbc normal. Urinary output is adequate. The patient is ambulating well. The patient is tolerating a normal diet. Flatus has been passed. Objective:      No data found. General:    alert, appears stated age and cooperative   Bowel Sounds:  active   Lochia:  appropriate   Uterine Fundus:   firm   Episiotomy:  healing well, no significant drainage, no dehiscence, no significant erythema   DVT Evaluation:  No evidence of DVT seen on physical exam.     Lab Results   Component Value Date    WBC 17.5 (H) 2022    HGB 10.5 (L) 2022    HCT 33.3 (L) 2022    MCV 77.4 (L) 2022     2022     Assessment:     Status post vaginal delivery. Doing well postpartum.      Plan:     Discharge home with standard precautions and return to clinic in 2 weeks

## 2022-07-11 NOTE — ANESTHESIA PROCEDURE NOTES
Epidural Block    Patient location during procedure: OB  Start time: 7/10/2022 7:27 PM  End time: 7/10/2022 7:31 PM  Reason for block: labor epidural  Staffing  Performed: resident/CRNA   Resident/CRNA: GARY Prasad CRNA  Epidural  Patient position: sitting  Prep: Betadine  Patient monitoring: continuous pulse ox and frequent blood pressure checks  Approach: midline  Location: L3-4  Injection technique: JOE saline  Guidance: paresthesia technique  Provider prep: mask and sterile gloves  Needle  Needle type: Tuohy   Needle gauge: 17 G  Needle length: 3.5 in  Needle insertion depth: 9 cm  Catheter type: end hole  Catheter size: 19 G  Catheter at skin depth: 14 cm  Test dose: negativeCatheter Secured: tegaderm and tape  Assessment  Sensory level: T6  Hemodynamics: stable  Attempts: 1  Outcomes: uncomplicated and patient tolerated procedure well  Preanesthetic Checklist  Completed: patient identified, IV checked, site marked, risks and benefits discussed, surgical/procedural consents, equipment checked, pre-op evaluation, timeout performed, anesthesia consent given, oxygen available and monitors applied/VS acknowledged

## 2022-07-11 NOTE — ANESTHESIA POSTPROCEDURE EVALUATION
Department of Anesthesiology  Postprocedure Note    Patient: Teddy Castano  MRN: [id number]  YOB: 1995  Date of evaluation: 7/11/2022      Procedure Summary     Date: 07/10/22 Room / Location:     Anesthesia Start: 1915 Anesthesia Stop: 07/11/22 0600    Procedure: Labor Analgesia Diagnosis:     Scheduled Providers:  Responsible Provider: GARY Alberto CRNA    Anesthesia Type: epidural ASA Status: 2          Anesthesia Type: No value filed.     Samina Phase I:      Samina Phase II:        Anesthesia Post Evaluation    Patient location during evaluation: bedside  Patient participation: complete - patient participated  Level of consciousness: awake and alert  Pain score: 0  Airway patency: patent  Nausea & Vomiting: no nausea and no vomiting  Complications: no  Cardiovascular status: blood pressure returned to baseline  Respiratory status: acceptable  Hydration status: stable

## 2022-07-12 ENCOUNTER — HOSPITAL ENCOUNTER (OUTPATIENT)
Age: 27
Discharge: HOME | End: 2022-07-12
Attending: OBSTETRICS & GYNECOLOGY | Admitting: OBSTETRICS & GYNECOLOGY
Payer: COMMERCIAL

## 2022-07-12 LAB
ABO/RH: NORMAL
ABO/RH: NORMAL
FETAL SCREEN: NORMAL
RHIG LOT NUMBER: NORMAL

## 2022-07-12 PROCEDURE — 6360000002 HC RX W HCPCS: Performed by: OBSTETRICS & GYNECOLOGY

## 2022-07-12 PROCEDURE — 96372 THER/PROPH/DIAG INJ SC/IM: CPT

## 2022-07-12 PROCEDURE — 99211 OFF/OP EST MAY X REQ PHY/QHP: CPT

## 2022-07-12 RX ADMIN — HUMAN RHO(D) IMMUNE GLOBULIN 300 MCG: 300 INJECTION, SOLUTION INTRAMUSCULAR at 13:03

## 2022-07-19 NOTE — H&P
Tomás Chandler is a 32 y.o. female patient with c/o contractions that began 2 nights ago, found to have variable decelerations begin admitted for IOL. No diagnosis found. Past Medical History:   Diagnosis Date    Depression     PCOS (polycystic ovarian syndrome)      38w5d  Estimated Date of Delivery: 7/20/22  Allergies   Allergen Reactions    Naltrexone-Bupropion Hcl Er Nausea Only     Nausea     Principal Problem:    38 weeks gestation of pregnancy  Resolved Problems:    * No resolved hospital problems. *    Blood pressure 134/85, pulse 82, temperature 97.1 °F (36.2 °C), temperature source Temporal, resp. rate 16, height 5' 7\" (1.702 m), weight 259 lb (117.5 kg), last menstrual period 09/20/2021, SpO2 97 %, unknown if currently breastfeeding. Maternal Medical History:   Reason for admission: Contractions. Contractions: Frequency: irregular. Fetal activity: Perceived fetal activity is normal.    Prenatal complications: Bilateral fetal clubbed feet  Prenatal Complications - Diabetes: none. Maternal Exam:   Uterine Assessment: Contraction frequency is irregular. Abdomen: Patient reports no abdominal tenderness. Fetal presentation: vertex  Introitus: Normal vulva. Normal vagina. Pelvis: adequate for delivery. Fetal Exam  Fetal Monitor Review: Mode: ultrasound. Pattern: variable decelerations and accelerations present. Fetal State Assessment: Category II - tracings are indeterminate. Assessment:  1. IUP at 38 5/7 wks  2. Contractions  3. Variable decelerations  4. Fetal Bilateral clubbed feet  5. Obesity in pregnancy  6. Rh negative  7. GBS positive    Plan:  1. Routine admission labs and intrapartum care  2. PCN GBS prophylaxis  3.   Rhogam postpartum as indicated    Pranav Gupta MD  7/18/2022

## 2022-07-19 NOTE — L&D DELIVERY NOTE
Mother's Information      Labor Events     Labor?: No  Cervical Ripening:   Now               Kim Fields [[id number]]      Labor Events     Labor?: No   Steroids?: None  Cervical Ripening Date/Time:     Antibiotics Received during Labor?: Yes  Rupture Identifier: Sac 1   Rupture Date/Time: 7/10/22 19:56:00   Rupture Type: AROM, Intact  Fluid Color: Clear  Fluid Odor: None  Fluid Volume: Scant  Induction: Cervidil, Oxytocin  Augmentation: AROM       Anesthesia    Method: Epidural       Start Pushing      Labor onset date/time:   Now     Dilation complete date/time: 22 04:15:00 Now     Start pushing date/time: 2022 04:40:00   Decision date/time (emergent ):           Labor Length    2nd stage: 1h 24m  3rd stage: 0h 26m       Delivery ()      Delivery Date/Time:  22 05:39:00   Delivery Type: Vaginal, Spontaneous    Details:            [place]jo Presentation    Presentation: Vertex       Shoulder Dystocia    Shoulder Dystocia Present?: No  Add Second Maneuver  Add Third Maneuver  Add Fourth Maneuver  Add Fifth Maneuver  Add Sixth Maneuver  Add Seventh Maneuver  Add Eighth Maneuver  Add Ninth Maneuver       Assisted Delivery Details    Forceps Attempted?: No  Vacuum Extractor Attempted?: No       Document Additional Attempt         Document Additional Attempt                 Cord    Complications: Nuchal Tight  Cord Around: Head  Delayed Cord Clamping?: No  Cord Clamped Date/Time: 2022 05:40:00  Cord Blood Disposition: Lab  Gases Sent?: Yes       Placenta    Date/Time: 2022 06:05:00  Removal: Spontaneous  Appearance: Intact  Disposition: Lab       Lacerations    Episiotomy: None  Perineal Lacerations: None  Other Lacerations: labial laceration, vaginal laceration   Repaired?: Yes     Vaginal Laceration?: Yes Repaired?: Yes   Number of Repair Packets: 2       Vaginal Counts    Initial Count Personnel: ART ARAGON  Initial Count Verified By: ELIZABETH BONILLA RN    Sponges Needles Instruments   Initial Counts Correct Correct Correct   Final Counts Correct Correct Correct   Final Count Personnel: Antoinette Tucker  Final Count Verified By: Parisa Perez RN  Accurate Final Count?: Yes  If the count is incorrect due to Intentionally Retained Foreign Object (IRFO) add the IRFO LDA in Lines/Drains. Add LDA: Link to [facility]       Blood Loss  Mother: Paco Lilly #[id number]     Start of Mother's Information      Delivery Blood Loss  07/10/22 1739 - 22 0539      Blood Hospital Encounter 750 mL    Total  750 mL               End of Mother's Information  Mother: Paco Lilly #[id number]                Delivery Providers    Delivering clinician: Zach Barraza MD     Provider Role     Obstetrician    Shellie Maxwell RN Primary Nurse    Cristy Ricci RN Primary  Nurse     NICU Nurse     Neonatologist     Anesthesiologist     Nurse Anesthetist     Nurse Practitioner     Midwife     Nursery Nurse    Gary Sanchez Surgical Tech    Jenna Henderson RCP Respiratory Therapist (Day)    Josemanuel Cody RN Registered Nurse              [place] Assessment    Living Status: Living     Apgar Scoring Key:    0 1 2    Skin Color: Blue or pale Acrocyanotic Completely pink    Heart Rate: Absent <100 bpm >100 bpm    Reflex Irritability: No response Grimace Cry or active withdrawal    Muscle Tone: Limp Some flexion Active motion    Respiratory Effort: Absent Weak cry; hypoventilation Good, crying                      Skin Color:   Heart Rate:   Reflex Irritability:   Muscle Tone:   Respiratory Effort:    Total:            1 Minute:    0    1    0    0    0    1        Apgar 1 total from OB History    5 Minute:    1    2    2    2    1    8        Apgar 5 total from OB History    10 Minute:  1    2    2    2    1    8                15 Minute:  1    2    2    2    2    9                20 Minute:                        Apgars Assigned Eliza Handy RN              Resuscitation Method: Bulb Suction, Stimulation, CPAP, Suctioning              Measurements      Birth Weight: 2860 g Birth Length: 0.5 m     Head Circumference: 0.33 m               Title      Skin to Skin Initiation Date/Time:       Skin to Skin End Date/Time:                    TIght nuchal cord at delivery clamped x 2 and cut. After reduction, remainder of infant's body delivered. He was handed off to the awaiting pediatric team because of initial visual assessment. Intact placenta with 3-vessel cord delivered spontaneously. Vaginal and labial laceration repaired. Patient with postpartum hemorrhage managed with rectal cytotec, pitocin and TXA. Total  mL. Patient in stable condition after procedure completed. Infant taken to special care nursery.

## 2022-08-05 NOTE — PROGRESS NOTES
Lesley Borja is a 32 y.o. who presents today for   Review of Systems    Past Medical History:   Diagnosis Date    Depression     PCOS (polycystic ovarian syndrome)        Past Surgical History:   Procedure Laterality Date    CHOLECYSTECTOMY      LEG SURGERY Right     spider bite    TONSILLECTOMY         Family History   Problem Relation Age of Onset    Diabetes Father        Social History     Socioeconomic History    Marital status: Single     Spouse name: Not on file    Number of children: Not on file    Years of education: Not on file    Highest education level: Not on file   Occupational History    Not on file   Tobacco Use    Smoking status: Never    Smokeless tobacco: Never   Vaping Use    Vaping Use: Never used   Substance and Sexual Activity    Alcohol use: Not Currently    Drug use: Never    Sexual activity: Yes     Partners: Male   Other Topics Concern    Not on file   Social History Narrative    Not on file     Social Determinants of Health     Financial Resource Strain: Not on file   Food Insecurity: Not on file   Transportation Needs: Not on file   Physical Activity: Not on file   Stress: Not on file   Social Connections: Not on file   Intimate Partner Violence: Not on file   Housing Stability: Not on file         Current Outpatient Medications:     ibuprofen (ADVIL;MOTRIN) 800 MG tablet, Take 1 tablet by mouth every 8 hours as needed for Pain, Disp: 90 tablet, Rfl: 0    ferrous sulfate (FE TABS) 325 (65 Fe) MG EC tablet, Take 1 tablet by mouth 2 times daily, Disp: 90 tablet, Rfl: 2    pantoprazole (PROTONIX) 40 MG tablet, Take 1 tablet by mouth every morning (before breakfast), Disp: 30 tablet, Rfl: 3    loratadine (CLARITIN) 10 MG tablet, Take 1 tablet by mouth daily, Disp: 30 tablet, Rfl: 3    citalopram (CELEXA) 20 MG tablet, Take 1 tablet by mouth daily, Disp: 30 tablet, Rfl: 3    Prenatal MV-Min-Fe Fum-FA-DHA (PRENATAL 1 PO), Take by mouth, Disp: , Rfl:     acetaminophen (TYLENOL) 325 MG tablet, Take 650 mg by mouth every 6 hours as needed for Pain , Disp: , Rfl:     calcium carbonate (TUMS) 500 MG chewable tablet, Take 1 tablet by mouth daily , Disp: , Rfl:     ondansetron (ZOFRAN ODT) 4 MG disintegrating tablet, Take 1 tablet by mouth every 8 hours as needed for Nausea or Vomiting, Disp: 40 tablet, Rfl: 2    Allergies   Allergen Reactions    Naltrexone-Bupropion Hcl Er Nausea Only     Nausea       LMP 09/20/2021   Physical Exam          Assessment  {No diagnosis found. (Refresh or delete this SmartLink)}    Plan     ***  I Rajwinder Peña, am scribing for and in the presence of Dr. Malcolm Patel. I, Dr. Malcolm Patel, personally performed the services described in this documentation as scribed by Rajwinder Peña in my presence, and it is both accurate and complete.

## 2022-08-08 ENCOUNTER — TELEMEDICINE (OUTPATIENT)
Dept: OBGYN CLINIC | Age: 27
End: 2022-08-08

## 2022-08-08 PROCEDURE — 0503F POSTPARTUM CARE VISIT: CPT | Performed by: OBSTETRICS & GYNECOLOGY

## 2022-08-08 ASSESSMENT — ENCOUNTER SYMPTOMS
RESPIRATORY NEGATIVE: 1
GASTROINTESTINAL NEGATIVE: 1
EYES NEGATIVE: 1

## 2022-08-08 NOTE — PROGRESS NOTES
Bouchra Roberts (1995) initiated an asynchronous digital communication through [employer]. 2022    TELEHEALTH EVALUATION -- Audio/Visual (During GWUMX-76 public health emergency)    HPI:    Bouchra Roberts (:  1995) has requested an audio/video evaluation for the following concern(s):    Pt presents today for her 4 week postpartum visit following a vaginal delivery on 7-10-22. Pt having some spotting. Pt having some soreness at her vaginal incision. Baby has app[facility]. Pt states her mood is good. Pt asking when she can start her Ritalin. Review of Systems   Constitutional: Negative. HENT: Negative. Eyes: Negative. Respiratory: Negative. Cardiovascular: Negative. Gastrointestinal: Negative. Genitourinary: Negative. Negative for dysuria, frequency, menstrual problem, pelvic pain, urgency and vaginal discharge. Skin: Negative. Neurological: Negative. Psychiatric/Behavioral: Negative. Past Medical History:   Diagnosis Date    Depression     PCOS (polycystic ovarian syndrome)        Past Surgical History:   Procedure Laterality Date    CHOLECYSTECTOMY      LEG SURGERY Right     spider bite    TONSILLECTOMY         Family History   Problem Relation Age of Onset    Diabetes Father        Social History     Tobacco Use    Smoking status: Never    Smokeless tobacco: Never   Vaping Use    Vaping Use: Never used   Substance Use Topics    Alcohol use: Not Currently    Drug use: Never       Prior to Visit Medications    Medication Sig Taking?  Authorizing Provider   ibuprofen (ADVIL;MOTRIN) 800 MG tablet Take 1 tablet by mouth every 8 hours as needed for Pain  GARY Randolph   ferrous sulfate (FE TABS) 325 (65 Fe) MG EC tablet Take 1 tablet by mouth 2 times daily  Janes Townsend MD   pantoprazole (PROTONIX) 40 MG tablet Take 1 tablet by mouth every morning (before breakfast)  Janes Townsend MD   loratadine (CLARITIN) 10 MG tablet Take 1 tablet by mouth daily  Zach Barraza MD   citalopram (CELEXA) 20 MG tablet Take 1 tablet by mouth daily  Zach Barraza MD   Prenatal MV-Min-Fe Fum-FA-DHA (PRENATAL 1 PO) Take by mouth  Historical Provider, MD   acetaminophen (TYLENOL) 325 MG tablet Take 650 mg by mouth every 6 hours as needed for Pain   Historical Provider, MD   calcium carbonate (TUMS) 500 MG chewable tablet Take 1 tablet by mouth daily   Historical Provider, MD   ondansetron (ZOFRAN ODT) 4 MG disintegrating tablet Take 1 tablet by mouth every 8 hours as needed for Nausea or Vomiting  GARY Blanco       Allergies   Allergen Reactions    Naltrexone-Bupropion Hcl Er Nausea Only     Nausea             PHYSICAL EXAMINATION  Physical Exam  Constitutional:       General: She is not in acute distress. Appearance: Normal appearance. She is not ill-appearing or diaphoretic. HENT:      Head: Normocephalic and atraumatic. Nose: Nose normal. No rhinorrhea. Eyes:      General: No scleral icterus. Right eye: No discharge. Left eye: No discharge. Extraocular Movements: Extraocular movements intact. Pulmonary:      Effort: Pulmonary effort is normal. No respiratory distress. Musculoskeletal:         General: Normal range of motion. Skin:     Coloration: Skin is not pale. Findings: No erythema or rash. Neurological:      Mental Status: She is alert and oriented to person, place, and time. Psychiatric:         Attention and Perception: Attention and perception normal.         Mood and Affect: Mood and affect normal.         Speech: Speech normal.         Behavior: Behavior normal. Behavior is cooperative. Thought Content: Thought content normal.         Cognition and Memory: Cognition and memory normal.         Judgment: Judgment normal.         ASSESSMENT   Diagnosis Orders   1.  Postpartum care following vaginal delivery            PLAN   Pt is breastfeeding, told her it is best to wait until done breastfeeding to start her other medications. RTC 4 weeks, needs pap smear. Curtis Ortega, am scribing for and in the presence of Dr. Lior Garcia. I, Dr. Lior Garcia, personally performed the services described in this documentation as scribed by Nehemiah La in my presence, and it is both accurate and complete. Tanya Arreaga is a 32 y.o. female being evaluated by a Virtual Visit (video visit) encounter to address concerns as mentioned above. A caregiver was present when appropriate. Due to this being a TeleHealth encounter (During QGPRK-87 public health emergency), evaluation of the following organ systems was limited: Vitals/Constitutional/EENT/Resp/CV/GI//MS/Neuro/Skin/Heme-Lymph-Imm. Pursuant to the emergency declaration under the [address] [place] [address], [address] [place] [address] and the [employer] and [employer] Act, this Virtual Visit was conducted with patient's (and/or legal guardian's) consent, to reduce the patient's risk of exposure to COVID-19 and provide necessary medical care. The patient (and/or legal guardian) has also been advised to contact this office for worsening conditions or problems, and seek emergency medical treatment and/or call 911 if deemed necessary. Patient identification was verified at the start of the visit: Yes    Total time spent on this encounter: Not billed by time    Services were provided through a video synchronous discussion virtually to substitute for in-person clinic visit. Patient and provider were located at their individual homes. --Luis Wilson RN on 8/8/2022 at 12:14 PM    An electronic signature was used to authenticate this note.    HPI: per patient questionnaire     Exam: not applicable    Diagnoses and all orders for this visit:  Diagnoses and all orders for this visit:    Postpartum care following vaginal delivery        Time: EV2 - 11-20 minutes were spent on the digital evaluation and management of this patient.      Norman Torres RN

## 2022-10-11 ENCOUNTER — OFFICE VISIT (OUTPATIENT)
Dept: FAMILY MEDICINE CLINIC | Facility: CLINIC | Age: 27
End: 2022-10-11

## 2022-10-11 VITALS
RESPIRATION RATE: 18 BRPM | WEIGHT: 289.2 LBS | HEART RATE: 100 BPM | HEIGHT: 67 IN | OXYGEN SATURATION: 98 % | SYSTOLIC BLOOD PRESSURE: 138 MMHG | TEMPERATURE: 97.3 F | DIASTOLIC BLOOD PRESSURE: 82 MMHG | BODY MASS INDEX: 45.39 KG/M2

## 2022-10-11 DIAGNOSIS — G56.03 BILATERAL CARPAL TUNNEL SYNDROME: ICD-10-CM

## 2022-10-11 DIAGNOSIS — R60.0 LEG EDEMA: ICD-10-CM

## 2022-10-11 DIAGNOSIS — F98.8 ATTENTION DEFICIT DISORDER (ADD) WITHOUT HYPERACTIVITY: ICD-10-CM

## 2022-10-11 DIAGNOSIS — Z87.09 HISTORY OF ASTHMA: ICD-10-CM

## 2022-10-11 DIAGNOSIS — E66.01 CLASS 3 SEVERE OBESITY DUE TO EXCESS CALORIES WITH BODY MASS INDEX (BMI) OF 45.0 TO 49.9 IN ADULT, UNSPECIFIED WHETHER SERIOUS COMORBIDITY PRESENT: Chronic | ICD-10-CM

## 2022-10-11 PROCEDURE — 99214 OFFICE O/P EST MOD 30 MIN: CPT | Performed by: FAMILY MEDICINE

## 2022-10-11 RX ORDER — IBUPROFEN 800 MG/1
1 TABLET ORAL EVERY 8 HOURS PRN
COMMUNITY
Start: 2022-07-11

## 2022-10-11 RX ORDER — UREA 10 %
1 LOTION (ML) TOPICAL DAILY
Status: ON HOLD | COMMUNITY
End: 2023-03-08

## 2022-10-11 RX ORDER — METHYLPHENIDATE HYDROCHLORIDE 27 MG/1
27 TABLET ORAL EVERY MORNING
Qty: 30 TABLET | Refills: 0 | Status: SHIPPED | OUTPATIENT
Start: 2022-10-11 | End: 2022-11-21

## 2022-10-11 NOTE — PROGRESS NOTES
Subjective   My Chavez is a 27 y.o. female presenting with chief complaint of:   Chief Complaint   Patient presents with   • Med Management   • Leg Swelling     Post partum leg swelling       History of Present Illness :  With her infant child.   Here for review of chronic problems that includes leg edema/worse during/since pregnancy/vaginal delivery and others.     Has few chronic problems to consider that might have a bearing on today's issues; somewhat an interval appointment.       Chronic/acute problems reviewed today:   1. Leg edema: worse during pregancy/since.  Better after she lays down   2. Attention deficit disorder (ADD) without hyperactivity chronic even his childhood and in school difficulty with concentration and younger hyperactivity.  Medicine has helped a great deal but she went off of it during her pregnancy   3. Bilateral carpal tunnel syndrome right-handed and despite trying a wrist splint she continues to have numbness of the fifth fourth and middle finger.  Less on the left.  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 Class 3 severe obesity due to excess calories with body mass index (BMI) of 45.0 to 49.9 in adult, unspecified whether serious comorbidity present (HCC) continues to worsen with the risk and carries   5. History of asthma chronic stable asthma; no significant shortness of breath or wheeze or ER presentations     Has an/another acute issue today: wants to go back on ritalin.    The following portions of the patient's history were reviewed and updated as appropriate: allergies, current medications, past family history, past medical history, past social history, past surgical history and problem list.      Current Outpatient Medications:   •  acetaminophen (TYLENOL) 325 MG tablet, Take 650 mg by mouth Every 6 (Six) Hours As Needed for Mild Pain  (Takes 2 tablets as needed.)., Disp: , Rfl:   •  ALPRAZolam (XANAX) 0.5 MG tablet,  TAKE ONE TABLET DAILY AS NEEDED FOR ANXIETY , Disp: 30 tablet, Rfl: 0  •  cetirizine-pseudoephedrine (ZyrTEC-D) 5-120 MG per 12 hr tablet, Take 1 tablet by mouth 2 (Two) Times a Day., Disp: 28 tablet, Rfl: 0  •  ibuprofen (ADVIL,MOTRIN) 800 MG tablet, Take 1 tablet by mouth Every 8 (Eight) Hours As Needed., Disp: , Rfl:   •  methylphenidate 27 MG CR tablet, Take 1 tablet by mouth Every Morning, Disp: 30 tablet, Rfl: 0  •  adapalene (DIFFERIN) 0.3 % gel, , Disp: , Rfl:   •  albuterol sulfate  (90 Base) MCG/ACT inhaler, Inhale 2 puffs Every 4 (Four) Hours As Needed for Wheezing., Disp: 8 g, Rfl: 0  •  calcium carbonate (OS-DAIANA) 1250 (500 Ca) MG chewable tablet, Chew 1 tablet Daily., Disp: , Rfl:   •  cefdinir (OMNICEF) 300 MG capsule, Take 1 capsule by mouth 2 (Two) Times a Day., Disp: 20 capsule, Rfl: 0  •  cetirizine (zyrTEC) 10 MG tablet, Take 10 mg by mouth Daily., Disp: , Rfl:   •  etonogestrel-ethinyl estradiol (NuvaRing) 0.12-0.015 MG/24HR vaginal ring, Insert 1 each into the vagina Every 28 (Twenty-Eight) Days. Insert vaginally and leave in place for 3 consecutive weeks, then remove for 1 week., Disp: 1 each, Rfl: 12  •  fluconazole (Diflucan) 100 MG tablet, Take 1 tablet by mouth Daily., Disp: 3 tablet, Rfl: 0  •  fluticasone-salmeterol (Advair Diskus) 100-50 MCG/DOSE DISKUS, Inhale 1 puff 2 (Two) Times a Day., Disp: 60 each, Rfl: 1  •  guaiFENesin-codeine (GUAIFENESIN AC) 100-10 MG/5ML liquid, Take 5 mL by mouth 3 (Three) Times a Day As Needed for Cough or Congestion., Disp: 118 mL, Rfl: 0  •  sertraline (Zoloft) 50 MG tablet, Take 1 tablet by mouth Daily., Disp: 30 tablet, Rfl: 2    No problems with medications.    Allergies   Allergen Reactions   • Contrave [Naltrexone-Bupropion Hcl Er] Nausea Only     Nausea         Review of Systems  GENERAL:  Active home/work; minimal/infrequent exercise. . Sleep is ok ; no apnea. No fevers.  SKIN: No rash/skin lesion of concern:   ENDO:  No syncope, near or  diaphoretic sweaty spells.  HEENT: No recent head injury; same occ diffuse headache.   No vision change.  No hearing loss.  Ears without pain/drainage.  No sore throat.  No nasal/sinus congestion/drainage. No epistaxis.  CHEST: No chest wall tenderness or mass.,.  No cough, without wheeze.  No SOB; no hemoptysis.  CV: No chest pain, palpitations: worsening/end of day ankle edema.  GI: No dysphagia.  No abdominal pain, diarrhea, constipation.  No rectal bleeding, or melena.  Occ heartburn.    :  Voids without dysuria, or incontinence to completion.  GYN: Menses less heavy; has gyne  ORTHO: No painful/swollen joints but various on /off sore.  No sore neck or back.  No acute neck or back pain without recent injury.  NEURO: No dizziness, weakness of extremities.  No numbness/paresthesias.   PSYCH: No memory loss.  Mood good; variable anxious, depressed but/and not suicidal.  Tries to tolerate stress .   Screening:  Mammogram: NA  Bone density: NA  Low dose CT chest: Tobacco-smoker/none: NA  GI:   Colon-neg/Chano//10.2.17  EGD-itis/Chano//10.2.17  Prostate: NA  Usual lab order  3m CBC, iron  12m CBC, CMP, LIPID, TSH, fT4, iron     Copy/paste function used for ROS/exam AND each area of these were reviewed, updated, confirmed and supplemented as needed.  Data reviewed:   Recent admit/ER/MD visits: last visit  Last cardiac testing:   Echo: none    Radiology considered:   No radiology results for the last 90 days.    Lab Results:  Results for orders placed or performed during the hospital encounter of 08/18/21   Chlamydia trachomatis, Neisseria gonorrhoeae, PCR - Urine, Urine, Clean Catch    Specimen: Urine, Clean Catch   Result Value Ref Range    Chlamydia DNA by PCR Not Detected Not Detected    Neisseria gonorrhoeae by PCR Not Detected Not Detected   Trichomonas vaginalis, PCR - Urine, Urine, Clean Catch    Specimen: Urine, Clean Catch   Result Value Ref Range    Trichomonas vaginalis PCR Not Detected Not Detected  "  Urine Culture - Urine, Urine, Clean Catch    Specimen: Urine, Clean Catch   Result Value Ref Range    Urine Culture >100,000 CFU/mL Escherichia coli (A)        Susceptibility    Escherichia coli - LIDIA     Ampicillin  Susceptible ug/ml     Ampicillin + Sulbactam  Susceptible ug/ml     Cefazolin  Susceptible ug/ml     Cefepime  Susceptible ug/ml     Ceftazidime  Susceptible ug/ml     Ceftriaxone  Susceptible ug/ml     Gentamicin  Susceptible ug/ml     Levofloxacin  Susceptible ug/ml     Nitrofurantoin  Susceptible ug/ml     Piperacillin + Tazobactam  Susceptible ug/ml     Tetracycline  Susceptible ug/ml     Trimethoprim + Sulfamethoxazole  Susceptible ug/ml   POC Urinalysis Dipstick, Multipro (Automated dipstick)    Specimen: Urine   Result Value Ref Range    Color Yellow Yellow, Straw, Dark Yellow, Deborah    Clarity, UA Cloudy (A) Clear    Glucose, UA Negative Negative, 1000 mg/dL (3+) mg/dL    Bilirubin Small (1+) (A) Negative    Ketones, UA 15 mg/dL (A) Negative    Specific Gravity  1.030 1.005 - 1.030    Blood, UA Large (A) Negative    pH, Urine 6.0 5.0 - 8.0    Protein,  mg/dL (A) Negative mg/dL    Urobilinogen, UA Normal Normal    Nitrite, UA Negative Negative    Leukocytes Trace (A) Negative     CBC:  Lab Results - Last 18 Months   Lab Units 07/11/22  1214 07/09/22  1909 05/02/22  1140 12/06/21  1657   WBC K/uL 17.5* 11.1* 10.3 10.3   HEMOGLOBIN g/dL 10.5* 10.3* 10.4* 13.4   HEMATOCRIT % 33.3* 33.2* 33.3* 41.5   PLATELETS K/uL 224 213 200 287      Objective   /82 (BP Location: Left arm, Patient Position: Sitting, Cuff Size: Adult)   Pulse 100   Temp 97.3 °F (36.3 °C) (Infrared)   Resp 18   Ht 170.2 cm (67\")   Wt 131 kg (289 lb 3.2 oz)   LMP 10/08/2022 (Exact Date)   SpO2 98%   BMI 45.30 kg/m²   Body mass index is 45.3 kg/m².    Recent Vitals       10/18/2021 11/3/2021 10/11/2022       BP: -- 112/76 138/82     Pulse: 127 114 100     Temp: 97.5 °F (36.4 °C) 97.7 °F (36.5 °C) 97.3 °F (36.3 " °C)     Weight: 124 kg (274 lb) 126 kg (277 lb 6.4 oz) 131 kg (289 lb 3.2 oz)     BMI (Calculated): 42.9 43.4 45.3         Wt Readings from Last 15 Encounters:   10/11/22 1456 131 kg (289 lb 3.2 oz)   11/03/21 1429 126 kg (277 lb 6.4 oz)   10/18/21 1127 124 kg (274 lb)   10/12/21 1344 125 kg (275 lb)   08/18/21 0728 120 kg (265 lb 1.6 oz)   07/06/21 1551 110 kg (242 lb)   06/28/21 0810 119 kg (262 lb)   05/25/21 1404 109 kg (241 lb)   03/09/21 1050 109 kg (240 lb)   12/08/20 1043 119 kg (262 lb)   10/06/20 0936 120 kg (264 lb 6.4 oz)   06/04/20 0944 122 kg (268 lb)   05/28/20 1839 123 kg (272 lb)   05/12/20 1502 123 kg (271 lb)   02/19/20 0921 128 kg (282 lb)       Physical Exam  GENERAL:  Obese/developed in no acute distress.   SKIN: Turgor excellent, without wound, rash, lesion.  HEENT: Normal cephalic without trauma.  Pupils equal round reactive to light. Extraocular motions full without nystagmus.   External canals nonobstructive nontender without reddness. Tymphatic membranes raman with cheikh structures intact.   Oral cavity without growths, exudates, and moist.  Posterior pharynx without mass, obstruction, redness.  No thyromegaly, mass, tenderness, lymphadenopathy and supple.  CV: Regular rhythm.  No murmur, gallop; 1/4 mid calf brawny edema. Posterior pulses intact.  No carotid bruits.  CHEST: No chest wall tenderness or mass.   LUNGS: Symmetric motion with clear to auscultation.  No dullness to percussion  ABD: Soft, nontender without mass.   ORTHO: Symmetric extremities without swelling/point tenderness.  Full gross range of motion.  NEURO: CN 2-12 grossly intact.  Symmetric facies and UE/LE. 3/5 strength throughout. Nonfocal use extremities. Speech clear.   PSYCH: Oriented x 3.  Pleasant calm, well kept.  Purposeful/directed conservation with intact short/long gross memory.       Assessment & Plan     1. Leg edema    2. Attention deficit disorder (ADD) without hyperactivity    3. Bilateral carpal tunnel  syndrome    4. Class 3 severe obesity due to excess calories with body mass index (BMI) of 45.0 to 49.9 in adult, unspecified whether serious comorbidity present (HCC)    5. History of asthma        Data review above:   Discussions/medical decisions/reviews:  BP ok  Other vitals ok      Data review above:   Rx: reviewed and decisions:   Rx new/changes: none  New Medications Ordered This Visit   Medications   • methylphenidate 27 MG CR tablet     Sig: Take 1 tablet by mouth Every Morning     Dispense:  30 tablet     Refill:  0     Controlled substance form discussed/agreed to  Start vascular studies    Orders placed:   LAB/Testing/Referrals: reviewed/orders:   Today: She will get labs below at her work  Orders Placed This Encounter   Procedures   • US Venous Doppler Lower Extremity Bilateral (duplex)   • Ambulatory Referral to Neurosurgery     Chronic/recurrent labs above or change to:   Same     Screening reviewed/updated as appropriate    Immunization History   Administered Date(s) Administered   • FluMist 2-49yrs 10/25/2016, 06/30/2017   • PPD Test 06/18/2019, 07/31/2019, 10/06/2020   • Tdap 06/17/2017     Vaccine reviewed: today none; later we advised/reaffirmed our support/suggestion for staying complete with covid- covid boosters, seasonal flu/yearly and any missing vaccine from list we supplied; we suggest contact with local health department office to review missing/needed vaccines and then bring nursing documentation for these vaccines to this office.     Health maintenance:   Body mass index is 45.3 kg/m².  Class 3 Severe Obesity (BMI >=40). Obesity-related health conditions include the following: lower extremity venous stasis disease. Obesity is unchanged. BMI is is above average; BMI management plan is completed. We discussed portion control and increasing exercise.      Tobacco use reviewed:   My Chavez  reports that she quit smoking about 5 years ago. Her smoking use included cigarettes. She started  smoking about 8 years ago. She smoked an average of .1 packs per day. She has never used smokeless tobacco..       Patient Instructions   US venous doppler lower extremity bilateral  2 leg vvi    CMP, CBC, iron, ferritin when you can      Visit today involved chronic significant medical problems or differentials and/or intensive drug monitoring: ie potential to cause serious morbidity or death:     Follow up: Return for 6m-controlled substance agreement.  Future Appointments   Date Time Provider Department Center   4/18/2023  2:15 PM Mak Nuñez MD [facility]

## 2022-10-18 ENCOUNTER — HOSPITAL ENCOUNTER (EMERGENCY)
Facility: HOSPITAL | Age: 27
Discharge: LEFT BEFORE DISCHARGE | End: 2022-10-18

## 2022-10-18 PROCEDURE — 99211 OFF/OP EST MAY X REQ PHY/QHP: CPT

## 2022-10-19 DIAGNOSIS — T18.108A FOREIGN BODY IN ESOPHAGUS, INITIAL ENCOUNTER: Primary | ICD-10-CM

## 2022-10-19 NOTE — ED NOTES
Pt now reports to Doni HARO at triage desk and states steak went down throat.  Doni HARO asked if pt still wants to see provider.  Pt states she feels better and is going home.  No distress noted.  Pt ambulated out of triage area.

## 2022-10-19 NOTE — ED NOTES
Pt ambulated to triage area and states she has piece of meat stuck in throat.  Pt gagging and vomiting frothy liquid as pt signs into triage desk.

## 2022-10-19 NOTE — PROGRESS NOTES
"Spoke to patient and advised \"it only happen that one time and it resolved when I was in the waiting room\" but they said they will keep it on file.  "

## 2022-11-18 DIAGNOSIS — F98.8 ATTENTION DEFICIT DISORDER (ADD) WITHOUT HYPERACTIVITY: ICD-10-CM

## 2022-11-21 RX ORDER — METHYLPHENIDATE HYDROCHLORIDE 27 MG/1
TABLET ORAL
Qty: 30 TABLET | Refills: 0 | Status: SHIPPED | OUTPATIENT
Start: 2022-11-21 | End: 2022-12-22

## 2022-11-21 NOTE — TELEPHONE ENCOUNTER
Protocol last refill 10-11-22    [place]  [facility]    Rx Refill Note  Requested Prescriptions     Pending Prescriptions Disp Refills   • methylphenidate 27 MG CR tablet [Pharmacy Med Name: METHYLPHENIDATE HYDROCHLORIDE ER 27MG ER TABLET ER] 30 tablet 0     Sig: TAKE ONE TABLET BY MOUTH EVERY MORNING      Last office visit with prescribing clinician: 10/11/2022      Next office visit with prescribing clinician: 4/18/2023          {TIP  Is Refill Pharmacy correct?:23}  Olivia Owusu LPN  11/21/22, 15:40 CST

## 2022-12-20 ENCOUNTER — OFFICE VISIT (OUTPATIENT)
Dept: NEUROSURGERY | Facility: CLINIC | Age: 27
End: 2022-12-20

## 2022-12-20 VITALS — WEIGHT: 293 LBS | BODY MASS INDEX: 45.99 KG/M2 | HEIGHT: 67 IN

## 2022-12-20 DIAGNOSIS — E66.01 CLASS 3 SEVERE OBESITY DUE TO EXCESS CALORIES WITHOUT SERIOUS COMORBIDITY WITH BODY MASS INDEX (BMI) OF 40.0 TO 44.9 IN ADULT: Primary | ICD-10-CM

## 2022-12-20 DIAGNOSIS — R20.0 NUMBNESS AND TINGLING IN BOTH HANDS: ICD-10-CM

## 2022-12-20 DIAGNOSIS — R20.2 NUMBNESS AND TINGLING IN BOTH HANDS: ICD-10-CM

## 2022-12-20 DIAGNOSIS — Z78.9 NON-SMOKER: ICD-10-CM

## 2022-12-20 PROBLEM — G56.03 BILATERAL CARPAL TUNNEL SYNDROME: Status: ACTIVE | Noted: 2022-12-20

## 2022-12-20 PROCEDURE — 99214 OFFICE O/P EST MOD 30 MIN: CPT | Performed by: NURSE PRACTITIONER

## 2022-12-20 RX ORDER — PANTOPRAZOLE SODIUM 40 MG/1
TABLET, DELAYED RELEASE ORAL
Status: ON HOLD | COMMUNITY
Start: 2022-11-18 | End: 2023-03-08 | Stop reason: SDUPTHER

## 2022-12-20 RX ORDER — CITALOPRAM 20 MG/1
TABLET ORAL
COMMUNITY
Start: 2022-11-18

## 2022-12-20 NOTE — PROGRESS NOTES
Chief complaint:   Chief Complaint   Patient presents with   • Hand Pain     Pt she is having bilateral hand numbness, tingling and pain. Pt has not had EMG done.       Subjective     HPI: This is a 27-year-old female patient who was referred to us by Dr. Mak Nuñez for bilateral hand pain and numbness and tingling.  She says that this started after she was pregnant with her child.  She says the pain initially started in her thumbs but has progressed over to the first 3 digits of her hand.  She says the numbness tingling is there all the time but the pain does get worse at nighttime and does wake her up from sleeping.  Denies any neck pain.  Denies any radicular arm pain.  She is right-hand dominant.  She works as a .  She is single.  Denies any tobacco, alcohol, or illicit drug use.    Review of Systems   Constitutional: Positive for activity change.   Cardiovascular: Positive for leg swelling.   All other systems reviewed and are negative.       Past Medical History:   Diagnosis Date   • Abdominal pain    • Acid reflux    • Anemia    • Anxiety    • Asthma     POLLEN CAN CAUSE SYMPTOMS NO ASTHMA FOR 8 YEARS   • Depression    • Diarrhea    • Gallbladder abscess    • GERD (gastroesophageal reflux disease)    • Nausea and/or vomiting    • Seasonal allergies      Past Surgical History:   Procedure Laterality Date   • ADENOIDECTOMY     • CHOLECYSTECTOMY  04/13/2018   • CHOLECYSTECTOMY WITH INTRAOPERATIVE CHOLANGIOGRAM N/A 4/13/2018    Procedure: LAPAROSCOPIC CHOLECYSTECTOMY  WITH CHOLANGIOGRAM;  Surgeon: Martita Gunter MD;  Location: [facility] OR;  Service: General   • COLONOSCOPY N/A 10/2/2017    Procedure: COLONOSCOPY WITH ANESTHESIA;  Surgeon: Manoj Madden DO;  Location: [facility] ENDOSCOPY;  Service:    • ENDOSCOPY N/A 10/2/2017    Procedure: ESOPHAGOGASTRODUODENOSCOPY WITH ANESTHESIA;  Surgeon: Manoj Madden DO;  Location: [facility] ENDOSCOPY;  Service:    • LEG SURGERY     • TONSILLECTOMY    "    Family History   Problem Relation Age of Onset   • Hypertension Mother    • Hypertension Father    • No Known Problems Brother    • Breast cancer Paternal Grandmother    • Colon cancer Neg Hx    • Esophageal cancer Neg Hx      Social History     Tobacco Use   • Smoking status: Former     Packs/day: 0.10     Types: Cigarettes     Start date: 2014     Quit date: 10/27/2016     Years since quittin.1   • Smokeless tobacco: Never   Vaping Use   • Vaping Use: Never used   Substance Use Topics   • Alcohol use: Yes     Alcohol/week: 1.0 standard drink     Types: 1 Glasses of wine per week     Comment: occ    • Drug use: No     (Not in a hospital admission)    Allergies:  Contrave [naltrexone-bupropion hcl er]    Objective      Vital Signs  Ht 170.2 cm (67\")   Wt 133 kg (294 lb)   BMI 46.05 kg/m²     Physical Exam  Constitutional:       Appearance: Normal appearance. She is well-developed.   HENT:      Head: Normocephalic.   Eyes:      General: Lids are normal.      Extraocular Movements: EOM normal.      Conjunctiva/sclera: Conjunctivae normal.      Pupils: Pupils are equal, round, and reactive to light.   Cardiovascular:      Rate and Rhythm: Normal rate and regular rhythm.   Pulmonary:      Effort: Pulmonary effort is normal.      Breath sounds: Normal breath sounds.   Musculoskeletal:         General: Normal range of motion.      Cervical back: Normal range of motion.   Skin:     General: Skin is warm.   Neurological:      Mental Status: She is alert and oriented to person, place, and time.      GCS: GCS eye subscore is 4. GCS verbal subscore is 5. GCS motor subscore is 6.      Cranial Nerves: No cranial nerve deficit.      Sensory: No sensory deficit.      Motor: Motor strength is normal.      Gait: Gait is intact.      Deep Tendon Reflexes: Reflexes are normal and symmetric. Reflexes normal.   Psychiatric:         Speech: Speech normal.         Behavior: Behavior normal.         Thought Content: Thought " content normal.         Neurologic Exam     Mental Status   Oriented to person, place, and time.   Attention: normal. Concentration: normal.   Speech: speech is normal   Level of consciousness: alert  Normal comprehension.     Cranial Nerves     CN II   Visual fields full to confrontation.     CN III, IV, VI   Pupils are equal, round, and reactive to light.  Extraocular motions are normal.     CN V   Facial sensation intact.     CN VII   Facial expression full, symmetric.     CN VIII   CN VIII normal.     CN IX, X   CN IX normal.   CN X normal.     CN XI   CN XI normal.     CN XII   CN XII normal.     Motor Exam   Muscle bulk: normal    Strength   Strength 5/5 throughout.     Sensory Exam   Light touch normal.     Gait, Coordination, and Reflexes     Gait  Gait: normal    Reflexes   Reflexes 2+ except as noted.       Imaging review: no imaging        Assessment/Plan: The patient does have what clinically sounds like carpal tunnel syndrome.  I am going to order bilateral cock-up splint for her to try and wear sleeping at night to see if this will help with her pain and numbness and tingling.  We will also order an EMG/NCS of the bilateral upper extremities.  We will have her follow-up after testing is completed and make further recommendation at that time.  Patient is a nonsmoker  The patient's Body mass index is 46.05 kg/m².. BMI is above normal parameters. Recommendations include: educational material and nutrition counseling    Diagnoses and all orders for this visit:    1. Class 3 severe obesity due to excess calories without serious comorbidity with body mass index (BMI) of 40.0 to 44.9 in adult (HCC) (Primary)    2. Numbness and tingling in both hands  -     EMG & Nerve Conduction Test; Future  -     Miscellaneous DME    3. Non-smoker          I discussed the patients findings and my recommendations with patient    Elder Bai, APRN  12/20/22  13:12 CST

## 2022-12-20 NOTE — PATIENT INSTRUCTIONS

## 2022-12-21 ENCOUNTER — PATIENT ROUNDING (BHMG ONLY) (OUTPATIENT)
Dept: NEUROSURGERY | Facility: CLINIC | Age: 27
End: 2022-12-21

## 2022-12-21 NOTE — PROGRESS NOTES
A My-Chart message has been sent to the patient for PATIENT ROUNDING with [facility].

## 2022-12-22 DIAGNOSIS — F98.8 ATTENTION DEFICIT DISORDER (ADD) WITHOUT HYPERACTIVITY: ICD-10-CM

## 2022-12-22 RX ORDER — METHYLPHENIDATE HYDROCHLORIDE 27 MG/1
TABLET ORAL
Qty: 30 TABLET | Refills: 0 | Status: SHIPPED | OUTPATIENT
Start: 2022-12-22 | End: 2023-01-26

## 2022-12-22 NOTE — TELEPHONE ENCOUNTER
Protocol last refill 11-21-22    [place]  [facility]    Rx Refill Note  Requested Prescriptions     Pending Prescriptions Disp Refills   • methylphenidate 27 MG CR tablet [Pharmacy Med Name: METHYLPHENIDATE HYDROCHLORIDE ER 27MG ER TABLET ER] 30 tablet 0     Sig: TAKE ONE TABLET EVERY MORNING      Last office visit with prescribing clinician: 10/11/2022      Next office visit with prescribing clinician: 4/18/2023            Olivia Owusu LPN  12/22/22, 12:48 CST

## 2023-01-03 ENCOUNTER — OFFICE VISIT (OUTPATIENT)
Age: 28
End: 2023-01-03
Payer: COMMERCIAL

## 2023-01-03 VITALS
OXYGEN SATURATION: 97 % | SYSTOLIC BLOOD PRESSURE: 118 MMHG | BODY MASS INDEX: 45.52 KG/M2 | DIASTOLIC BLOOD PRESSURE: 76 MMHG | TEMPERATURE: 97.3 F | HEART RATE: 86 BPM | HEIGHT: 67 IN | WEIGHT: 290 LBS

## 2023-01-03 DIAGNOSIS — J39.8 CONGESTION OF UPPER RESPIRATORY TRACT: Primary | ICD-10-CM

## 2023-01-03 DIAGNOSIS — R05.1 ACUTE COUGH: ICD-10-CM

## 2023-01-03 LAB
INFLUENZA A ANTIBODY: NORMAL
INFLUENZA B ANTIBODY: NORMAL
S PYO AG THROAT QL: NORMAL

## 2023-01-03 PROCEDURE — 99213 OFFICE O/P EST LOW 20 MIN: CPT | Performed by: NURSE PRACTITIONER

## 2023-01-03 PROCEDURE — 87804 INFLUENZA ASSAY W/OPTIC: CPT | Performed by: NURSE PRACTITIONER

## 2023-01-03 PROCEDURE — 87880 STREP A ASSAY W/OPTIC: CPT | Performed by: NURSE PRACTITIONER

## 2023-01-03 RX ORDER — DEXTROMETHORPHAN HYDROBROMIDE AND PROMETHAZINE HYDROCHLORIDE 15; 6.25 MG/5ML; MG/5ML
SYRUP ORAL
Qty: 177.44 ML | Refills: 0 | Status: SHIPPED | OUTPATIENT
Start: 2023-01-03

## 2023-01-03 ASSESSMENT — ENCOUNTER SYMPTOMS
SINUS PRESSURE: 1
VOICE CHANGE: 0
CHOKING: 0
CHEST TIGHTNESS: 0
WHEEZING: 0
COUGH: 1
SORE THROAT: 1
GASTROINTESTINAL NEGATIVE: 1
TROUBLE SWALLOWING: 0
SHORTNESS OF BREATH: 0
COLOR CHANGE: 0
RHINORRHEA: 0
EYES NEGATIVE: 1
STRIDOR: 0

## 2023-01-03 NOTE — PATIENT INSTRUCTIONS
Take tylenol/motrin and alternate every 4-6 hours as needed for pain  Gargle with salt water  Take mucinex/zyrtec/flonase daily for 2 weeks  Follow up with PCP if symptoms worsen or fail to improve.

## 2023-01-03 NOTE — PROGRESS NOTES
pro  [address]  [address] [address] [place] [address]  Dept: [phone]  Dept Fax: [phone]  Loc: [phone]     Dali Jean Baptiste is a 32 y.o. female who presents today for her medical conditions/complaintsas noted below. Dali Jean Baptiste is c/o of Cough, Otalgia (Both ears. started dec 23rd), and Congestion (sinus)        HPI:   URI  This is a new problem. The current episode started in the past 7 days. The problem occurs constantly. The problem has been unchanged. Associated symptoms include congestion, coughing and a sore throat. Pertinent negatives include  abdominal pain, anorexia, arthralgias, change in bowel habit, chest pain, chills, diaphoresis, fatigue, fever, headaches, joint swelling, myalgias, nausea, neck pain, numbness, rash, swollen glands, urinary symptoms, vertigo, visual change, vomiting or weakness. Pt tried OTC meds with mild symptom relief. Denies any other symptoms.        Results for orders placed or performed in visit on 01/03/23   POCT Influenza A/B   Result Value Ref Range    Influenza A Ab NEG     Influenza B Ab NEG    POCT rapid strep A   Result Value Ref Range    Strep A Ag None Detected None Detected          Past Medical History:   Diagnosis Date    Depression     PCOS (polycystic ovarian syndrome)       Past Surgical History:   Procedure Laterality Date    CHOLECYSTECTOMY      LEG SURGERY Right     spider bite    TONSILLECTOMY         Family History   Problem Relation Age of Onset    Diabetes Father        Social History     Tobacco Use    Smoking status: Never    Smokeless tobacco: Never   Substance Use Topics    Alcohol use: Not Currently      Current Outpatient Medications   Medication Sig Dispense Refill    ibuprofen (ADVIL;MOTRIN) 800 MG tablet Take 1 tablet by mouth every 8 hours as needed for Pain 90 tablet 0    pantoprazole (PROTONIX) 40 MG tablet Take 1 tablet by mouth every morning (before breakfast) 30 tablet 3    loratadine (CLARITIN) 10 MG tablet Take 1 tablet by mouth daily 30 tablet 3    citalopram (CELEXA) 20 MG tablet Take 1 tablet by mouth daily 30 tablet 3    acetaminophen (TYLENOL) 325 MG tablet Take 650 mg by mouth every 6 hours as needed for Pain       calcium carbonate (TUMS) 500 MG chewable tablet Take 1 tablet by mouth daily       ferrous sulfate (FE TABS) 325 (65 Fe) MG EC tablet Take 1 tablet by mouth 2 times daily (Patient not taking: Reported on 1/3/2023) 90 tablet 2    Prenatal MV-Min-Fe Fum-FA-DHA (PRENATAL 1 PO) Take by mouth (Patient not taking: Reported on 1/3/2023)      ondansetron (ZOFRAN ODT) 4 MG disintegrating tablet Take 1 tablet by mouth every 8 hours as needed for Nausea or Vomiting 40 tablet 2     No current facility-administered medications for this visit. Facility-Administered Medications Ordered in Other Visits   Medication Dose Route Frequency Provider Last Rate Last Admin    rho(D) immune globulin (HYPERRHO S/D) injection 300 mcg  300 mcg IntraMUSCular Once Blima MD Elena         Allergies   Allergen Reactions    Naltrexone-Bupropion Hcl Er Nausea Only     Nausea       Health Maintenance   Topic Date Due    COVID-19 Vaccine (1) Never done    Varicella vaccine (1 of 2 - 2-dose childhood series) Never done    Depression Screen  Never done    HIV screen  Never done    Hepatitis C screen  Never done    Pap smear  Never done    Flu vaccine (1) Never done    DTaP/Tdap/Td vaccine (2 - Td or Tdap) 06/13/2032    Hepatitis A vaccine  Aged Out    Hib vaccine  Aged Out    Meningococcal (ACWY) vaccine  Aged Out    Pneumococcal 0-64 years Vaccine  Aged Out       Subjective:     Review of Systems   Constitutional: Negative. HENT:  Positive for congestion, ear pain, sinus pressure and sore throat. Negative for rhinorrhea, sneezing, trouble swallowing and voice change. Eyes: Negative. Respiratory:  Positive for cough.  Negative for choking, chest tightness, shortness of breath, wheezing and stridor. Cardiovascular: Negative. Gastrointestinal: Negative. Endocrine: Negative. Genitourinary: Negative. Musculoskeletal: Negative. Skin:  Negative for color change. Neurological: Negative. Hematological: Negative. Psychiatric/Behavioral: Negative. Objective:     Physical Exam  Vitals and nursing note reviewed. Constitutional:       General: She is not in acute distress. Appearance: Normal appearance. She is well-developed. She is not ill-appearing or toxic-appearing. HENT:      Head: Normocephalic and atraumatic. Right Ear: Hearing, tympanic membrane, ear canal and external ear normal.      Left Ear: Hearing, tympanic membrane, ear canal and external ear normal.      Nose: Nose normal.      Right Sinus: No maxillary sinus tenderness or frontal sinus tenderness. Left Sinus: No maxillary sinus tenderness or frontal sinus tenderness. Mouth/Throat:      Lips: Pink. Mouth: Mucous membranes are moist.      Pharynx: Oropharynx is clear. Tonsils: 0 on the right. 0 on the left. Eyes:      Conjunctiva/sclera: Conjunctivae normal.      Pupils: Pupils are equal, round, and reactive to light. Neck:      Thyroid: No thyroid mass. Trachea: Trachea normal.   Cardiovascular:      Rate and Rhythm: Normal rate and regular rhythm. Heart sounds: Normal heart sounds. Pulmonary:      Effort: Pulmonary effort is normal.      Breath sounds: Normal breath sounds. Abdominal:      General: Bowel sounds are normal.      Palpations: Abdomen is soft. Musculoskeletal:         General: Normal range of motion. Cervical back: Normal range of motion. Lymphadenopathy:      Head:      Right side of head: No submental, submandibular, tonsillar, preauricular, posterior auricular or occipital adenopathy. Left side of head: No submental, submandibular, tonsillar, preauricular, posterior auricular or occipital adenopathy.    Skin: General: Skin is warm and moist.      Capillary Refill: Capillary refill takes less than 2 seconds. Neurological:      Mental Status: She is alert and oriented to person, place, and time. Psychiatric:         Speech: Speech normal.         Behavior: Behavior normal.         Thought Content: Thought content normal.         Judgment: Judgment normal.     /76 (Site: Left Upper Arm)   Pulse 86   Temp 97.3 °F (36.3 °C) (Temporal)   Ht 5' 7\" (1.702 m)   Wt 290 lb (131.5 kg)   LMP 12/05/2022   SpO2 97%   BMI 45.42 kg/m²     Assessment:          Diagnosis Orders   1. Congestion of upper respiratory tract  POCT Influenza A/B    POCT rapid strep A      2. Acute cough  POCT Influenza A/B    POCT rapid strep A          Plan:      Orders Placed This Encounter   Procedures    POCT Influenza A/B    POCT rapid strep A        No follow-ups on file. Orders Placed This Encounter   Procedures    POCT Influenza A/B    POCT rapid strep A     No orders of the defined types were placed in this encounter. Patient given educationalmaterials - see patient instructions. Discussed use, benefit, and side effectsof prescribed medications. All patient questions answered. Pt voiced understanding. Reviewed health maintenance. Instructed to continue current medications, diet andexercise. Patient agreed with treatment plan. Follow up as directed. There are no Patient Instructions on file for this visit.       Electronically signed by GARY Betancur CNP on 1/3/2023 at 5:20 PM

## 2023-01-10 ENCOUNTER — HOSPITAL ENCOUNTER (OUTPATIENT)
Dept: GENERAL RADIOLOGY | Facility: HOSPITAL | Age: 28
Discharge: HOME | End: 2023-01-10
Admitting: FAMILY MEDICINE
Payer: COMMERCIAL

## 2023-01-10 DIAGNOSIS — R93.3 ABNORMAL ESOPHAGRAM: ICD-10-CM

## 2023-01-10 DIAGNOSIS — T18.108A FOREIGN BODY IN ESOPHAGUS, INITIAL ENCOUNTER: ICD-10-CM

## 2023-01-10 DIAGNOSIS — T18.108A FOREIGN BODY IN ESOPHAGUS, INITIAL ENCOUNTER: Primary | ICD-10-CM

## 2023-01-10 PROCEDURE — 74220 X-RAY XM ESOPHAGUS 1CNTRST: CPT

## 2023-01-10 RX ADMIN — BARIUM SULFATE 120 ML: 980 POWDER, FOR SUSPENSION ORAL at 10:01

## 2023-01-10 RX ADMIN — BARIUM SULFATE 700 MG: 700 TABLET ORAL at 10:00

## 2023-01-10 RX ADMIN — ANTACID/ANTIFLATULENT 1 PACKET: 380; 550; 10; 10 GRANULE, EFFERVESCENT ORAL at 10:01

## 2023-01-10 NOTE — PROGRESS NOTES
Notified pt detailed mychart message, pt has reviewed results on her mychart. Order placed for referral

## 2023-01-26 DIAGNOSIS — J30.2 SEASONAL ALLERGIES: ICD-10-CM

## 2023-01-26 DIAGNOSIS — F32.A DEPRESSION AFFECTING PREGNANCY: ICD-10-CM

## 2023-01-26 DIAGNOSIS — O99.340 DEPRESSION AFFECTING PREGNANCY: ICD-10-CM

## 2023-01-26 DIAGNOSIS — F98.8 ATTENTION DEFICIT DISORDER (ADD) WITHOUT HYPERACTIVITY: ICD-10-CM

## 2023-01-26 DIAGNOSIS — O26.899 HEARTBURN DURING PREGNANCY, ANTEPARTUM: ICD-10-CM

## 2023-01-26 DIAGNOSIS — R12 HEARTBURN DURING PREGNANCY, ANTEPARTUM: ICD-10-CM

## 2023-01-26 RX ORDER — PANTOPRAZOLE SODIUM 40 MG/1
TABLET, DELAYED RELEASE ORAL
Qty: 30 TABLET | Refills: 5 | Status: SHIPPED | OUTPATIENT
Start: 2023-01-26

## 2023-01-26 RX ORDER — CITALOPRAM 20 MG/1
TABLET ORAL
Qty: 30 TABLET | Refills: 5 | Status: SHIPPED | OUTPATIENT
Start: 2023-01-26

## 2023-01-26 RX ORDER — METHYLPHENIDATE HYDROCHLORIDE 27 MG/1
TABLET ORAL
Qty: 30 TABLET | Refills: 0 | Status: SHIPPED | OUTPATIENT
Start: 2023-01-26 | End: 2023-03-06

## 2023-01-26 NOTE — TELEPHONE ENCOUNTER
[facility] wnl    Rx Refill Note  Requested Prescriptions     Pending Prescriptions Disp Refills   • methylphenidate 27 MG CR tablet [Pharmacy Med Name: METHYLPHENIDATE HYDROCHLORIDE ER 27MG ER TABLET ER] 30 tablet 0     Sig: TAKE ONE TABLET EVERY MORNING      Last office visit with prescribing clinician: 10/11/2022   Last telemedicine visit with prescribing clinician: 4/18/2023   Next office visit with prescribing clinician: 4/18/2023                         Would you like a call back once the refill request has been completed: [] Yes [] No    If the office needs to give you a call back, can they leave a voicemail: [] Yes [] No    Evon Sen MA  01/26/23, 15:47 CST

## 2023-02-21 ENCOUNTER — OFFICE VISIT (OUTPATIENT)
Dept: GASTROENTEROLOGY | Facility: CLINIC | Age: 28
End: 2023-02-21
Payer: COMMERCIAL

## 2023-02-21 VITALS
OXYGEN SATURATION: 99 % | BODY MASS INDEX: 45.99 KG/M2 | HEART RATE: 74 BPM | DIASTOLIC BLOOD PRESSURE: 78 MMHG | SYSTOLIC BLOOD PRESSURE: 136 MMHG | TEMPERATURE: 98 F | WEIGHT: 293 LBS | HEIGHT: 67 IN

## 2023-02-21 DIAGNOSIS — R93.3 ABNORMAL ESOPHAGRAM: ICD-10-CM

## 2023-02-21 DIAGNOSIS — R13.10 DYSPHAGIA, UNSPECIFIED TYPE: Primary | ICD-10-CM

## 2023-02-21 PROCEDURE — 99214 OFFICE O/P EST MOD 30 MIN: CPT | Performed by: NURSE PRACTITIONER

## 2023-02-21 NOTE — PROGRESS NOTES
Chief Complaint   Patient presents with   • Difficulty Swallowing     Getting choked on pills and meat since she had her baby 7 months ago       PCP: Mak Nuñez MD  REFER: No ref. provider found    Subjective     HPI    My Chavez presents to office with complain of intermittent difficulty swallowing solid food since summer 2023.  She states she has trouble with solid foods only.  Time and heimlich maneuver provides relief. No signs GI blood loss.  Bowels moving without difficulty.   She is not consistent with PPI use.    FL Esophagram Complete Single Contrast (01/10/2023 10:08)  barium tablet was stopped in the proximal esophagus at the level of aortic arch. No significant stenosis at this level was noted. No intrinsic mass was identified    UPPER GI ENDOSCOPY (10/02/2017 11:36)-esophagitis    COLONOSCOPY (10/02/2017 11:36)    Past Medical History:   Diagnosis Date   • Abdominal pain    • Acid reflux    • Anemia    • Anxiety    • Asthma     POLLEN CAN CAUSE SYMPTOMS NO ASTHMA FOR 8 YEARS   • Depression    • Diarrhea    • Gallbladder abscess    • GERD (gastroesophageal reflux disease)    • Nausea and/or vomiting    • Seasonal allergies        Past Surgical History:   Procedure Laterality Date   • ADENOIDECTOMY     • CHOLECYSTECTOMY  04/13/2018   • CHOLECYSTECTOMY WITH INTRAOPERATIVE CHOLANGIOGRAM N/A 4/13/2018    Procedure: LAPAROSCOPIC CHOLECYSTECTOMY  WITH CHOLANGIOGRAM;  Surgeon: Martita Gunter MD;  Location: [facility] OR;  Service: General   • COLONOSCOPY N/A 10/2/2017    Procedure: COLONOSCOPY WITH ANESTHESIA;  Surgeon: Manoj Madden DO;  Location: [facility] ENDOSCOPY;  Service:    • ENDOSCOPY N/A 10/2/2017    Procedure: ESOPHAGOGASTRODUODENOSCOPY WITH ANESTHESIA;  Surgeon: Manoj Madden DO;  Location: [facility] ENDOSCOPY;  Service:    • LEG SURGERY     • TONSILLECTOMY         Outpatient Medications Marked as Taking for the 2/21/23 encounter (Office Visit) with Derrick Kennedy APRN  "  Medication Sig Dispense Refill   • acetaminophen (TYLENOL) 325 MG tablet Take 650 mg by mouth Every 6 (Six) Hours As Needed for Mild Pain  (Takes 2 tablets as needed.).     • albuterol sulfate  (90 Base) MCG/ACT inhaler Inhale 2 puffs Every 4 (Four) Hours As Needed for Wheezing. 8 g 0   • ALPRAZolam (XANAX) 0.5 MG tablet TAKE ONE TABLET DAILY AS NEEDED FOR ANXIETY  30 tablet 0   • citalopram (CeleXA) 20 MG tablet      • ibuprofen (ADVIL,MOTRIN) 800 MG tablet Take 1 tablet by mouth Every 8 (Eight) Hours As Needed.     • methylphenidate 27 MG CR tablet TAKE ONE TABLET EVERY MORNING 30 tablet 0   • pantoprazole (PROTONIX) 40 MG EC tablet          Allergies   Allergen Reactions   • Contrave [Naltrexone-Bupropion Hcl Er] Nausea Only     Nausea         Social History     Socioeconomic History   • Marital status: Single   • Number of children: 0   • Years of education: 14   Tobacco Use   • Smoking status: Former     Packs/day: 0.10     Types: Cigarettes     Start date: 2014     Quit date: 10/27/2016     Years since quittin.3   • Smokeless tobacco: Never   Vaping Use   • Vaping Use: Never used   Substance and Sexual Activity   • Alcohol use: Not Currently     Alcohol/week: 1.0 standard drink     Types: 1 Glasses of wine per week     Comment: occ    • Drug use: No   • Sexual activity: Yes     Partners: Male     Birth control/protection: Condom       Review of Systems   Constitutional: Negative for unexpected weight change.   HENT: Positive for trouble swallowing.    Respiratory: Negative for shortness of breath.    Cardiovascular: Negative for chest pain.   Gastrointestinal: Negative for abdominal pain and anal bleeding.       Objective     Vitals:    23 1016   BP: 136/78   Pulse: 74   Temp: 98 °F (36.7 °C)   SpO2: 99%   Weight: 133 kg (294 lb)   Height: 170.2 cm (67\")     Body mass index is 46.05 kg/m².    Physical Exam  Constitutional:       Appearance: Normal appearance. She is well-developed. "   Eyes:      General: No scleral icterus.  Cardiovascular:      Rate and Rhythm: Regular rhythm.      Heart sounds: Normal heart sounds. No murmur heard.  Pulmonary:      Effort: Pulmonary effort is normal. No accessory muscle usage.      Breath sounds: Normal breath sounds.   Abdominal:      General: Bowel sounds are normal. There is no distension.      Palpations: Abdomen is soft. There is no mass.      Tenderness: There is no abdominal tenderness. There is no guarding or rebound.   Skin:     General: Skin is warm and dry.      Coloration: Skin is not jaundiced.   Neurological:      Mental Status: She is alert.   Psychiatric:         Behavior: Behavior is cooperative.         Imaging Results (Most Recent)     None          Body mass index is 46.05 kg/m².    Assessment & Plan     Diagnoses and all orders for this visit:    1. Dysphagia, unspecified type (Primary)  -     Case Request; Standing  -     Implement Anesthesia Orders Day of Procedure; Standing  -     Obtain Informed Consent; Standing  -     Case Request    2. Abnormal esophagram         ESOPHAGOGASTRODUODENOSCOPY WITH ANESTHESIA (N/A)    Take PPI 30 min prior to breakfast   Decrease caffeine, nicotine, etoh- all contribute to acid reflux  Do not eat 2-3 hours within lying down, elevated HOB 4-6 inches     Proceed with EGD due to abnormal esophagram.  If EGD is unremarkable will need to consider ST.  I explained Heimlich maneuver does not relieve objects stuck in esophagus.  If she is experiencing difficulty breathing when food becomes stuck this could be an anxiety component.    Advised pt to stop use of NSAIDs, Fish Oil, and MV 5 days prior to procedure, per Dr Madden protocol.  Tylenol based products are ok to take.  Pt verbalized understanding.    The risks of the endoscopy were discussed in detail.  We discussed the risks of perforation (one out of [id number], riskier with dilation), bleeding (one out of 500), and the rare risks of infection, adverse  reaction to anesthesia, respiratory failure, cardiac failure including MI and adverse reaction to medications, etc.  We discussed consequences that could occur if a risk were to develop such as the need for hospitalization, blood transfusion, surgical intervention, medications, pain and disability and death.  Alternatives include not doing anything, or pursuing an UGI series which only offers a diagnosis with potential less accuracy compared to EGD.  The patient verbalizes understanding and agrees to proceed.           Derrick Kennedy, APRN  02/21/23          There are no Patient Instructions on file for this visit.

## 2023-02-22 PROBLEM — R13.10 DYSPHAGIA: Status: ACTIVE | Noted: 2023-02-22

## 2023-03-06 DIAGNOSIS — F98.8 ATTENTION DEFICIT DISORDER (ADD) WITHOUT HYPERACTIVITY: ICD-10-CM

## 2023-03-06 DIAGNOSIS — F41.9 ANXIETY: ICD-10-CM

## 2023-03-06 RX ORDER — ALPRAZOLAM 0.5 MG/1
TABLET ORAL
Qty: 30 TABLET | Refills: 0 | Status: SHIPPED | OUTPATIENT
Start: 2023-03-06

## 2023-03-06 RX ORDER — METHYLPHENIDATE HYDROCHLORIDE 27 MG/1
TABLET ORAL
Qty: 30 TABLET | Refills: 0 | Status: SHIPPED | OUTPATIENT
Start: 2023-03-06 | End: 2023-04-07

## 2023-03-06 NOTE — TELEPHONE ENCOUNTER
[facility] WNL  Per Protocol Last Refill 01/26/2023    Rx Refill Note  Requested Prescriptions     Pending Prescriptions Disp Refills   • methylphenidate 27 MG CR tablet [Pharmacy Med Name: METHYLPHENIDATE HYDROCHLORIDE ER 27MG ER TABLET ER] 30 tablet 0     Sig: TAKE ONE TABLET EVERY MORNING      Last office visit with prescribing clinician: 10/18/2021      Next office visit with prescribing clinician: Visit date not found            Zac De Paz MA  03/06/23, 16:11 CST

## 2023-03-06 NOTE — TELEPHONE ENCOUNTER
[facility] WNL    Rx Refill Note  Requested Prescriptions     Pending Prescriptions Disp Refills   • ALPRAZolam (XANAX) 0.5 MG tablet [Pharmacy Med Name: ALPRAZOLAM 0.5MG TABLET] 30 tablet 0     Sig: TAKE ONE TABLET DAILY AS NEEDED FOR ANXIETY      Last office visit with prescribing clinician: 10/11/2022   Last telemedicine visit with prescribing clinician: 4/18/2023   Next office visit with prescribing clinician: 4/18/2023                         Would you like a call back once the refill request has been completed: [] Yes [] No    If the office needs to give you a call back, can they leave a voicemail: [] Yes [] No    Evon Sen MA  03/06/23, 17:53 CST

## 2023-03-08 ENCOUNTER — ANESTHESIA (OUTPATIENT)
Dept: GASTROENTEROLOGY | Facility: HOSPITAL | Age: 28
End: 2023-03-08
Payer: COMMERCIAL

## 2023-03-08 ENCOUNTER — HOSPITAL ENCOUNTER (OUTPATIENT)
Facility: HOSPITAL | Age: 28
Setting detail: HOSPITAL OUTPATIENT SURGERY
Discharge: HOME | End: 2023-03-08
Attending: INTERNAL MEDICINE | Admitting: INTERNAL MEDICINE
Payer: COMMERCIAL

## 2023-03-08 ENCOUNTER — ANESTHESIA EVENT (OUTPATIENT)
Dept: GASTROENTEROLOGY | Facility: HOSPITAL | Age: 28
End: 2023-03-08
Payer: COMMERCIAL

## 2023-03-08 VITALS
BODY MASS INDEX: 45.99 KG/M2 | HEIGHT: 67 IN | OXYGEN SATURATION: 100 % | TEMPERATURE: 97 F | HEART RATE: 83 BPM | DIASTOLIC BLOOD PRESSURE: 80 MMHG | RESPIRATION RATE: 21 BRPM | SYSTOLIC BLOOD PRESSURE: 120 MMHG | WEIGHT: 293 LBS

## 2023-03-08 DIAGNOSIS — R13.10 DYSPHAGIA, UNSPECIFIED TYPE: ICD-10-CM

## 2023-03-08 LAB — B-HCG UR QL: NEGATIVE

## 2023-03-08 PROCEDURE — 88305 TISSUE EXAM BY PATHOLOGIST: CPT | Performed by: INTERNAL MEDICINE

## 2023-03-08 PROCEDURE — 81025 URINE PREGNANCY TEST: CPT | Performed by: ANESTHESIOLOGY

## 2023-03-08 PROCEDURE — 43239 EGD BIOPSY SINGLE/MULTIPLE: CPT | Performed by: INTERNAL MEDICINE

## 2023-03-08 PROCEDURE — 25010000002 PROPOFOL 10 MG/ML EMULSION: Performed by: NURSE ANESTHETIST, CERTIFIED REGISTERED

## 2023-03-08 RX ORDER — LIDOCAINE HYDROCHLORIDE 10 MG/ML
0.5 INJECTION, SOLUTION EPIDURAL; INFILTRATION; INTRACAUDAL; PERINEURAL ONCE AS NEEDED
Status: DISCONTINUED | OUTPATIENT
Start: 2023-03-08 | End: 2023-03-08 | Stop reason: HOSPADM

## 2023-03-08 RX ORDER — SODIUM CHLORIDE 9 MG/ML
1000 INJECTION, SOLUTION INTRAVENOUS CONTINUOUS
Status: DISCONTINUED | OUTPATIENT
Start: 2023-03-08 | End: 2023-03-08 | Stop reason: HOSPADM

## 2023-03-08 RX ORDER — PANTOPRAZOLE SODIUM 40 MG/1
40 TABLET, DELAYED RELEASE ORAL
Qty: 60 TABLET | Refills: 11 | Status: SHIPPED | OUTPATIENT
Start: 2023-03-08

## 2023-03-08 RX ORDER — SODIUM CHLORIDE 0.9 % (FLUSH) 0.9 %
10 SYRINGE (ML) INJECTION AS NEEDED
Status: DISCONTINUED | OUTPATIENT
Start: 2023-03-08 | End: 2023-03-08 | Stop reason: HOSPADM

## 2023-03-08 RX ORDER — LIDOCAINE HYDROCHLORIDE 20 MG/ML
INJECTION, SOLUTION EPIDURAL; INFILTRATION; INTRACAUDAL; PERINEURAL AS NEEDED
Status: DISCONTINUED | OUTPATIENT
Start: 2023-03-08 | End: 2023-03-08 | Stop reason: SURG

## 2023-03-08 RX ORDER — PROPOFOL 10 MG/ML
VIAL (ML) INTRAVENOUS AS NEEDED
Status: DISCONTINUED | OUTPATIENT
Start: 2023-03-08 | End: 2023-03-08 | Stop reason: SURG

## 2023-03-08 RX ORDER — SODIUM CHLORIDE 9 MG/ML
500 INJECTION, SOLUTION INTRAVENOUS CONTINUOUS PRN
Status: DISCONTINUED | OUTPATIENT
Start: 2023-03-08 | End: 2023-03-08 | Stop reason: HOSPADM

## 2023-03-08 RX ADMIN — PROPOFOL INJECTABLE EMULSION 100 MG: 10 INJECTION, EMULSION INTRAVENOUS at 10:51

## 2023-03-08 RX ADMIN — LIDOCAINE HYDROCHLORIDE 100 MG: 20 INJECTION, SOLUTION EPIDURAL; INFILTRATION; INTRACAUDAL; PERINEURAL at 10:49

## 2023-03-08 RX ADMIN — PROPOFOL INJECTABLE EMULSION 100 MG: 10 INJECTION, EMULSION INTRAVENOUS at 10:49

## 2023-03-08 RX ADMIN — SODIUM CHLORIDE 500 ML: 9 INJECTION, SOLUTION INTRAVENOUS at 10:31

## 2023-03-08 RX ADMIN — SODIUM CHLORIDE 1000 ML: 9 INJECTION, SOLUTION INTRAVENOUS at 10:32

## 2023-03-08 NOTE — ANESTHESIA POSTPROCEDURE EVALUATION
Patient: My Chavez    Procedure Summary     Date: 03/08/23 Room / Location: [facility] ENDOSCOPY 5 / [facility]    Anesthesia Start: 1048 Anesthesia Stop: 1058    Procedure: ESOPHAGOGASTRODUODENOSCOPY WITH ANESTHESIA Diagnosis:       Dysphagia, unspecified type      (Dysphagia, unspecified type [R13.10])    Surgeons: Manoj Madden DO Provider: Ramon Ng CRNA    Anesthesia Type: MAC ASA Status: 3          Anesthesia Type: MAC    Vitals  Vitals Value Taken Time   /76 03/08/23 1056   Temp     Pulse 91 03/08/23 1059   Resp 21 03/08/23 1055   SpO2 100 % 03/08/23 1059   Vitals shown include unvalidated device data.        Post Anesthesia Care and Evaluation    Patient location during evaluation: PHASE II  Patient participation: complete - patient participated  Level of consciousness: awake and alert  Pain score: 0  Pain management: adequate    Airway patency: patent  Anesthetic complications: No anesthetic complications  PONV Status: none  Cardiovascular status: acceptable and stable  Respiratory status: acceptable  Hydration status: acceptable

## 2023-03-09 LAB
CYTO UR: NORMAL
LAB AP CASE REPORT: NORMAL
LAB AP DIAGNOSIS COMMENT: NORMAL
Lab: NORMAL
PATH REPORT.FINAL DX SPEC: NORMAL
PATH REPORT.GROSS SPEC: NORMAL

## 2023-03-16 ENCOUNTER — TELEPHONE (OUTPATIENT)
Dept: GASTROENTEROLOGY | Facility: CLINIC | Age: 28
End: 2023-03-16
Payer: COMMERCIAL

## 2023-03-16 NOTE — TELEPHONE ENCOUNTER
----- Message from Manoj Madden DO sent at 3/10/2023 12:39 PM CST -----  Let her know she does have EOE   Would like her to cont her current meds    ----- Message -----  From: Lab, Background User  Sent: 3/9/2023   3:31 PM CST  To: Manoj Madden DO

## 2023-04-07 DIAGNOSIS — F98.8 ATTENTION DEFICIT DISORDER (ADD) WITHOUT HYPERACTIVITY: ICD-10-CM

## 2023-04-07 RX ORDER — METHYLPHENIDATE HYDROCHLORIDE 27 MG/1
TABLET, EXTENDED RELEASE ORAL
Qty: 30 TABLET | Refills: 0 | Status: SHIPPED | OUTPATIENT
Start: 2023-04-07

## 2023-04-07 NOTE — TELEPHONE ENCOUNTER
[facility] WNL  Per Protocol Last Refill 03/06/2023    Rx Refill Note  Requested Prescriptions     Pending Prescriptions Disp Refills   • Concerta 27 MG CR tablet [Pharmacy Med Name: CONCERTA 27MG TABLET ER] 30 tablet 0     Sig: TAKE ONE TABLET EVERY MORNING      Last office visit with prescribing clinician: 10/18/2021      Next office visit with prescribing clinician: Visit date not found            Zac De Paz MA  04/07/23, 13:35 CDT

## 2023-05-09 DIAGNOSIS — F98.8 ATTENTION DEFICIT DISORDER (ADD) WITHOUT HYPERACTIVITY: ICD-10-CM

## 2023-05-09 RX ORDER — METHYLPHENIDATE HYDROCHLORIDE 27 MG/1
TABLET ORAL
Qty: 30 TABLET | Refills: 0 | Status: SHIPPED | OUTPATIENT
Start: 2023-05-09

## 2023-05-09 NOTE — TELEPHONE ENCOUNTER
Rx Refill Note  Requested Prescriptions     Pending Prescriptions Disp Refills   • methylphenidate 27 MG CR tablet [Pharmacy Med Name: METHYLPHENIDATE HYDROCHLORIDE ER 27MG ER TABLET ER] 30 tablet 0     Sig: TAKE ONE TABLET EVERY MORNING      Last office visit with prescribing clinician: 10/18/2021      Next office visit with prescribing clinician: Visit date not found            Zac De Paz MA  05/09/23, 16:18 CDT

## 2023-05-15 ENCOUNTER — OFFICE VISIT (OUTPATIENT)
Dept: FAMILY MEDICINE CLINIC | Facility: CLINIC | Age: 28
End: 2023-05-15
Payer: COMMERCIAL

## 2023-05-15 VITALS
HEIGHT: 67 IN | BODY MASS INDEX: 45.99 KG/M2 | DIASTOLIC BLOOD PRESSURE: 88 MMHG | RESPIRATION RATE: 18 BRPM | TEMPERATURE: 96.8 F | HEART RATE: 89 BPM | SYSTOLIC BLOOD PRESSURE: 152 MMHG | OXYGEN SATURATION: 98 % | WEIGHT: 293 LBS

## 2023-05-15 DIAGNOSIS — R03.0 ELEVATED BLOOD PRESSURE READING: ICD-10-CM

## 2023-05-15 DIAGNOSIS — Z20.822 SUSPECTED COVID-19 VIRUS INFECTION: Primary | ICD-10-CM

## 2023-05-15 DIAGNOSIS — J40 BRONCHITIS: ICD-10-CM

## 2023-05-15 LAB
EXPIRATION DATE: NORMAL
INTERNAL CONTROL: NORMAL
Lab: NORMAL
SARS-COV-2 AG UPPER RESP QL IA.RAPID: NOT DETECTED

## 2023-05-15 RX ORDER — CEFDINIR 300 MG/1
300 CAPSULE ORAL 2 TIMES DAILY
Qty: 20 CAPSULE | Refills: 0 | Status: SHIPPED | OUTPATIENT
Start: 2023-05-15 | End: 2023-05-25

## 2023-05-15 NOTE — PROGRESS NOTES
Subjective   Chief Complaint:  Cough, sputum    History of Present Illness:  This 27 y.o. female was seen in the office today.  Reports acute cough within the last 3 days starting with thick sputum reports sputum has been green at times.  Has elevated blood pressure today-reports takes at home and normally does not run this high.    Allergies   Allergen Reactions   • Contrave [Naltrexone-Bupropion Hcl Er] Nausea Only     Nausea        Current Outpatient Medications on File Prior to Visit   Medication Sig   • acetaminophen (TYLENOL) 325 MG tablet Take 2 tablets by mouth Every 6 (Six) Hours As Needed for Mild Pain (Takes 2 tablets as needed.).   • albuterol sulfate  (90 Base) MCG/ACT inhaler Inhale 2 puffs Every 4 (Four) Hours As Needed for Wheezing.   • ALPRAZolam (XANAX) 0.5 MG tablet TAKE ONE TABLET DAILY AS NEEDED FOR ANXIETY   • cetirizine (zyrTEC) 10 MG tablet Take 1 tablet by mouth Daily.   • cetirizine-pseudoephedrine (ZyrTEC-D) 5-120 MG per 12 hr tablet Take 1 tablet by mouth 2 (Two) Times a Day.   • citalopram (CeleXA) 20 MG tablet    • fluticasone-salmeterol (Advair Diskus) 100-50 MCG/DOSE DISKUS Inhale 1 puff 2 (Two) Times a Day.   • ibuprofen (ADVIL,MOTRIN) 800 MG tablet Take 1 tablet by mouth Every 8 (Eight) Hours As Needed.   • methylphenidate 27 MG CR tablet TAKE ONE TABLET EVERY MORNING   • pantoprazole (PROTONIX) 40 MG EC tablet Take 1 tablet by mouth 2 (Two) Times a Day Before Meals.     No current facility-administered medications on file prior to visit.      Past Medical, Surgical, Social, and Family History:  Past Medical History:   Diagnosis Date   • Abdominal pain    • Acid reflux    • Anemia    • Anxiety    • Asthma     POLLEN CAN CAUSE SYMPTOMS NO ASTHMA FOR 8 YEARS   • Depression    • Diarrhea    • Gallbladder abscess    • GERD (gastroesophageal reflux disease)    • Nausea and/or vomiting    • Seasonal allergies      Past Surgical History:   Procedure Laterality Date   • ADENOIDECTOMY      • CHOLECYSTECTOMY  2018   • CHOLECYSTECTOMY WITH INTRAOPERATIVE CHOLANGIOGRAM N/A 2018    Procedure: LAPAROSCOPIC CHOLECYSTECTOMY  WITH CHOLANGIOGRAM;  Surgeon: Martita Gunter MD;  Location: [facility] OR;  Service: General   • COLONOSCOPY N/A 10/2/2017    Procedure: COLONOSCOPY WITH ANESTHESIA;  Surgeon: Manoj Madden DO;  Location: [facility] ENDOSCOPY;  Service:    • ENDOSCOPY N/A 10/2/2017    Procedure: ESOPHAGOGASTRODUODENOSCOPY WITH ANESTHESIA;  Surgeon: Manoj Madden DO;  Location: [facility] ENDOSCOPY;  Service:    • ENDOSCOPY N/A 3/8/2023    Procedure: ESOPHAGOGASTRODUODENOSCOPY WITH ANESTHESIA;  Surgeon: Manoj Madden DO;  Location: [facility] ENDOSCOPY;  Service: Gastroenterology;  Laterality: N/A;  pre dysphagia  post r/o eosiniphilic esophagitis  dr vinay guerra   • LEG SURGERY     • TONSILLECTOMY       Social History     Socioeconomic History   • Marital status: Single   • Number of children: 0   • Years of education: 14   Tobacco Use   • Smoking status: Former     Packs/day: 0.10     Types: Cigarettes     Start date: 2014     Quit date: 10/27/2016     Years since quittin.5   • Smokeless tobacco: Never   Vaping Use   • Vaping Use: Never used   Substance and Sexual Activity   • Alcohol use: Not Currently     Alcohol/week: 1.0 standard drink     Types: 1 Glasses of wine per week     Comment: occ    • Drug use: No   • Sexual activity: Yes     Partners: Male     Birth control/protection: Condom     Family History   Problem Relation Age of Onset   • Hypertension Mother    • Hypertension Father    • No Known Problems Brother    • Breast cancer Paternal Grandmother    • Colon cancer Neg Hx    • Esophageal cancer Neg Hx        Prior Visit Notes/Records, Lab, Imaging, and Diagnostic Results Reviewed:  A1C:No results for input(s): HGBA1C in the last 72096 hours.  GLUCOSE:No results for input(s): GLUCOSE in the last 46697 hours.  LIPID:No results for input(s): CHLPL, LDL, HDL, TRIG in the  "last 57944 hours.  PSA:No results for input(s): PSA in the last 87438 hours.  CBC:  Lab Results - Last 18 Months   Lab Units 07/11/22  1214 07/09/22  1909 05/02/22  1140 12/06/21  1657   WBC K/uL 17.5* 11.1* 10.3 10.3   HEMOGLOBIN g/dL 10.5* 10.3* 10.4* 13.4   HEMATOCRIT % 33.3* 33.2* 33.3* 41.5   PLATELETS K/uL 224 213 200 287      BMP/CMP:No results for input(s): NA, K, CL, CO2, GLUCOSE, BUN, CREATININE, EGFRIFNONA, EGFRIFAFRI, EGFRRESULT, CALCIUM in the last 10951 hours.  HEPATIC:No results for input(s): ALT, AST, ALKPHOS, TOTAL in the last 89302 hours.    Invalid input(s): HEPATITSC  Vit D:No results for input(s): VFUW81LT in the last 17738 hours.  THYROID:No results for input(s): TSH, FREET4, FTI in the last 62505 hours.    Invalid input(s): FREET3, T3, T4, TEUP,  TOTALT4  BMI Trend:  BMI Readings from Last 10 Encounters:   05/15/23 47.77 kg/m²   03/08/23 46.05 kg/m²   02/21/23 46.05 kg/m²   12/20/22 46.05 kg/m²   10/11/22 45.30 kg/m²   11/03/21 43.45 kg/m²   10/18/21 42.91 kg/m²   10/12/21 43.07 kg/m²   08/18/21 41.52 kg/m²   07/06/21 37.90 kg/m²     Objective   Physical Exam  Vitals reviewed.   Constitutional:       General: She is not in acute distress.     Appearance: Normal appearance.   HENT:      Right Ear: Tympanic membrane and ear canal normal.      Left Ear: Tympanic membrane and ear canal normal.   Cardiovascular:      Rate and Rhythm: Normal rate and regular rhythm.   Pulmonary:      Effort: Pulmonary effort is normal.      Breath sounds: Rhonchi (bronchial) present. No wheezing.     /88 (BP Location: Right arm, Patient Position: Sitting, Cuff Size: Adult)   Pulse 89   Temp 96.8 °F (36 °C) (Infrared)   Resp 18   Ht 170.2 cm (67\")   Wt (!) 138 kg (305 lb)   SpO2 98%   BMI 47.77 kg/m²     Assessment & Plan   Diagnoses and all orders for this visit:    1. Suspected COVID-19 virus infection (Primary)  -     POCT VERITOR SARS-CoV-2 Antigen    2. Bronchitis    3. Elevated blood pressure " reading  Comments:  -home monitor and keep me informed    Other orders  -     cefdinir (OMNICEF) 300 MG capsule; Take 1 capsule by mouth 2 (Two) Times a Day for 10 days.  Dispense: 20 capsule; Refill: 0    Discussion:  Advised and educated plan of care.  COVID test is negative-advised antibiotics to follow.    Follow-up:  Return for follow-up as needed.    Electronically signed by DEREK Durant, 05/15/23, 2:07 PM CDT.

## 2023-05-28 ENCOUNTER — OFFICE VISIT (OUTPATIENT)
Age: 28
End: 2023-05-28
Payer: COMMERCIAL

## 2023-05-28 VITALS
SYSTOLIC BLOOD PRESSURE: 118 MMHG | BODY MASS INDEX: 46.99 KG/M2 | TEMPERATURE: 98.5 F | DIASTOLIC BLOOD PRESSURE: 72 MMHG | OXYGEN SATURATION: 96 % | WEIGHT: 293 LBS | RESPIRATION RATE: 18 BRPM | HEART RATE: 105 BPM

## 2023-05-28 DIAGNOSIS — H92.02 OTALGIA OF LEFT EAR: ICD-10-CM

## 2023-05-28 DIAGNOSIS — H65.93 MIDDLE EAR EFFUSION, BILATERAL: ICD-10-CM

## 2023-05-28 DIAGNOSIS — J01.90 ACUTE NON-RECURRENT SINUSITIS, UNSPECIFIED LOCATION: Primary | ICD-10-CM

## 2023-05-28 PROCEDURE — 99213 OFFICE O/P EST LOW 20 MIN: CPT | Performed by: NURSE PRACTITIONER

## 2023-05-28 RX ORDER — METHYLPHENIDATE HYDROCHLORIDE 10 MG/1
10 TABLET ORAL 2 TIMES DAILY
COMMUNITY

## 2023-05-28 RX ORDER — AMOXICILLIN AND CLAVULANATE POTASSIUM 875; 125 MG/1; MG/1
1 TABLET, FILM COATED ORAL 2 TIMES DAILY
Qty: 20 TABLET | Refills: 0 | Status: SHIPPED | OUTPATIENT
Start: 2023-05-28 | End: 2023-06-07

## 2023-05-28 ASSESSMENT — ENCOUNTER SYMPTOMS
SORE THROAT: 1
WHEEZING: 0
SINUS PRESSURE: 1
COUGH: 1
SINUS PAIN: 1
GASTROINTESTINAL NEGATIVE: 1
SHORTNESS OF BREATH: 0
ALLERGIC/IMMUNOLOGIC NEGATIVE: 1
EYES NEGATIVE: 1

## 2023-06-09 DIAGNOSIS — F98.8 ATTENTION DEFICIT DISORDER (ADD) WITHOUT HYPERACTIVITY: ICD-10-CM

## 2023-06-09 RX ORDER — METHYLPHENIDATE HYDROCHLORIDE 27 MG/1
TABLET, EXTENDED RELEASE ORAL
Qty: 30 TABLET | Refills: 0 | Status: SHIPPED | OUTPATIENT
Start: 2023-06-09

## 2023-06-09 NOTE — TELEPHONE ENCOUNTER
Rx Refill Note  Requested Prescriptions     Pending Prescriptions Disp Refills   • Concerta 27 MG CR tablet [Pharmacy Med Name: CONCERTA 27MG TABLET ER] 30 tablet 0     Sig: TAKE ONE TABLET EVERY MORNING      Last office visit with prescribing clinician: 5/15/2023      Next office visit with prescribing clinician: Visit date not found            Zac De Paz MA  06/09/23, 16:49 CDT

## 2023-08-14 DIAGNOSIS — F98.8 ATTENTION DEFICIT DISORDER (ADD) WITHOUT HYPERACTIVITY: ICD-10-CM

## 2023-08-14 RX ORDER — METHYLPHENIDATE HYDROCHLORIDE 27 MG/1
TABLET ORAL
Qty: 30 TABLET | Refills: 0 | Status: SHIPPED | OUTPATIENT
Start: 2023-08-14 | End: 2023-08-16 | Stop reason: SDUPTHER

## 2023-08-15 DIAGNOSIS — F41.9 ANXIETY: ICD-10-CM

## 2023-08-15 DIAGNOSIS — F98.8 ATTENTION DEFICIT DISORDER (ADD) WITHOUT HYPERACTIVITY: ICD-10-CM

## 2023-08-15 RX ORDER — METHYLPHENIDATE HYDROCHLORIDE 27 MG/1
27 TABLET ORAL EVERY MORNING
Qty: 30 TABLET | Refills: 0 | Status: CANCELLED | OUTPATIENT
Start: 2023-08-15

## 2023-08-15 RX ORDER — ALPRAZOLAM 0.5 MG/1
0.25 TABLET ORAL 2 TIMES DAILY PRN
Qty: 30 TABLET | Refills: 0 | Status: SHIPPED | OUTPATIENT
Start: 2023-08-15

## 2023-08-15 NOTE — TELEPHONE ENCOUNTER
Caller: My Chavez    Relationship: Self    Best call back number: [phone]     Requested Prescriptions:   Requested Prescriptions     Pending Prescriptions Disp Refills    ALPRAZolam (XANAX) 0.5 MG tablet 30 tablet 0    methylphenidate 27 MG CR tablet 30 tablet 0     Sig: Take 1 tablet by mouth Every Morning        Pharmacy where request should be sent: [id number] [place] [address] AT ACROSS FROM [facility] [phone] [facility] [phone]      Last office visit with prescribing clinician: 10/11/2022   Last telemedicine visit with prescribing clinician: Visit date not found   Next office visit with prescribing clinician: Visit date not found     Additional details provided by patient: PATIENT IS OUT  OF MEDICATION COMPLETELY     Does the patient have less than a 3 day supply:  [x] Yes  [] No    Would you like a call back once the refill request has been completed: [] Yes [x] No        Antolin Azul Rep   08/15/23 12:28 CDT

## 2023-08-16 ENCOUNTER — TELEPHONE (OUTPATIENT)
Dept: FAMILY MEDICINE CLINIC | Facility: CLINIC | Age: 28
End: 2023-08-16
Payer: COMMERCIAL

## 2023-08-16 DIAGNOSIS — F98.8 ATTENTION DEFICIT DISORDER (ADD) WITHOUT HYPERACTIVITY: ICD-10-CM

## 2023-08-16 RX ORDER — METHYLPHENIDATE HYDROCHLORIDE 27 MG/1
27 TABLET ORAL EVERY MORNING
Qty: 30 TABLET | Refills: 0 | Status: SHIPPED | OUTPATIENT
Start: 2023-08-16

## 2023-08-16 NOTE — TELEPHONE ENCOUNTER
Caller: My Chavez    Relationship: Self    Best call back number: [phone]    What is the best time to reach you: ANYTIME    Who are you requesting to speak with (clinical staff, provider,  specific staff member): MALATHI    What was the call regarding: NEEDING TO HAVE ALL MEDICATIONS TRANSFERRED OVER TO [facility]/PHARMACY #[id number] - [place], [place] - [address] AT [address]CROSS FROM [place]  [phone] [facility] [phone] FX

## 2023-09-18 DIAGNOSIS — F98.8 ATTENTION DEFICIT DISORDER (ADD) WITHOUT HYPERACTIVITY: ICD-10-CM

## 2023-09-18 RX ORDER — METHYLPHENIDATE HYDROCHLORIDE 27 MG/1
27 TABLET ORAL EVERY MORNING
Qty: 30 TABLET | Refills: 0 | Status: SHIPPED | OUTPATIENT
Start: 2023-09-18

## 2023-09-18 NOTE — TELEPHONE ENCOUNTER
Caller: My Chavez    Relationship: Self    Best call back number: [phone]     Requested Prescriptions:   Requested Prescriptions     Pending Prescriptions Disp Refills    methylphenidate 27 MG CR tablet 30 tablet 0     Sig: Take 1 tablet by mouth Every Morning        Pharmacy where request should be sent: [facility]/PHARMACY #[id number] - [place], [place] - [address] AT ACROSS FROM [facility] [phone] [facility] [phone]      Last office visit with prescribing clinician: 10/11/2022   Last telemedicine visit with prescribing clinician: Visit date not found   Next office visit with prescribing clinician: Visit date not found     Additional details provided by patient: PATIENT TOOK LAST DOSE TODAY    Does the patient have less than a 3 day supply:  [x] Yes  [] No    Would you like a call back once the refill request has been completed: [x] Yes [] No    If the office needs to give you a call back, can they leave a voicemail: [x] Yes [] No    Antolin Moreno Rep   09/18/23 12:31 CDT

## 2023-09-18 NOTE — TELEPHONE ENCOUNTER
[facility] WNL  Per Protocol Last Refill 07/13/2023    Rx Refill Note  Requested Prescriptions     Pending Prescriptions Disp Refills    methylphenidate 27 MG CR tablet 30 tablet 0     Sig: Take 1 tablet by mouth Every Morning      Last office visit with prescribing clinician: 10/11/2022      Next office visit with prescribing clinician: Visit date not found            Zac De Paz MA  09/18/23, 15:41 CDT

## 2023-10-17 DIAGNOSIS — F98.8 ATTENTION DEFICIT DISORDER (ADD) WITHOUT HYPERACTIVITY: ICD-10-CM

## 2023-10-17 RX ORDER — PANTOPRAZOLE SODIUM 40 MG/1
40 TABLET, DELAYED RELEASE ORAL
Qty: 60 TABLET | Refills: 11 | Status: SHIPPED | OUTPATIENT
Start: 2023-10-17

## 2023-10-17 RX ORDER — METHYLPHENIDATE HYDROCHLORIDE 27 MG/1
27 TABLET ORAL EVERY MORNING
Qty: 30 TABLET | Refills: 0 | Status: SHIPPED | OUTPATIENT
Start: 2023-10-17

## 2023-10-17 NOTE — TELEPHONE ENCOUNTER
Caller: My Chavez    Relationship: Self    Best call back number: [phone]     Requested Prescriptions:   Requested Prescriptions     Pending Prescriptions Disp Refills    methylphenidate 27 MG CR tablet 30 tablet 0     Sig: Take 1 tablet by mouth Every Morning    pantoprazole (PROTONIX) 40 MG EC tablet 60 tablet 11     Sig: Take 1 tablet by mouth 2 (Two) Times a Day Before Meals.        Pharmacy where request should be sent: [facility]/PHARMACY #[id number] - [place], [place] - [address] AT ACROSS FROM [facility] [phone] [facility] [phone]      Last office visit with prescribing clinician: 10/11/2022   Last telemedicine visit with prescribing clinician: Visit date not found   Next office visit with prescribing clinician: Visit date not found     Additional details provided by patient:     pantoprazole (PROTONIX) 40 MG EC tablet   THIS MEDICATION WAS PRESCRIBED BY ANOTHER PHYSICIAN AT 2 A DAY SO SHE IS NEEDING IT REFILLED AS WELL BY DR FUNG     Does the patient have less than a 3 day supply:  [x] Yes  [] No    Would you like a call back once the refill request has been completed: [x] Yes [] No      Antolin Frederick Rep   10/17/23 13:41 CDT

## 2023-11-17 DIAGNOSIS — F98.8 ATTENTION DEFICIT DISORDER (ADD) WITHOUT HYPERACTIVITY: ICD-10-CM

## 2023-11-17 NOTE — TELEPHONE ENCOUNTER
Caller: My Chavez    Relationship: Self    Best call back number: [phone]     Requested Prescriptions:   Requested Prescriptions     Pending Prescriptions Disp Refills    methylphenidate 27 MG CR tablet 30 tablet 0     Sig: Take 1 tablet by mouth Every Morning    pantoprazole (PROTONIX) 40 MG EC tablet 60 tablet 11     Sig: Take 1 tablet by mouth 2 (Two) Times a Day Before Meals.        Pharmacy where request should be sent: [facility]/PHARMACY #[id number] - [place], [place] - [address] [facility] [address] [facility] [phone] [facility] [phone]      Last office visit with prescribing clinician: 10/11/2022   Last telemedicine visit with prescribing clinician: Visit date not found   Next office visit with prescribing clinician: Visit date not found     Additional details provided by patient: PATIENT HAS 1 DAY LEFT OF 1 OF HER MEDICATIONS    Does the patient have less than a 3 day supply:  [x] Yes  [] No    Would you like a call back once the refill request has been completed: [x] Yes [] No    If the office needs to give you a call back, can they leave a voicemail: [x] Yes [] No    Antolin Moreno Rep   11/17/23 12:11 CST

## 2023-11-20 RX ORDER — METHYLPHENIDATE HYDROCHLORIDE 27 MG/1
27 TABLET ORAL EVERY MORNING
Qty: 30 TABLET | Refills: 0 | Status: SHIPPED | OUTPATIENT
Start: 2023-11-20

## 2023-11-20 RX ORDER — PANTOPRAZOLE SODIUM 40 MG/1
40 TABLET, DELAYED RELEASE ORAL
Qty: 60 TABLET | Refills: 11 | Status: SHIPPED | OUTPATIENT
Start: 2023-11-20

## 2023-12-21 DIAGNOSIS — F98.8 ATTENTION DEFICIT DISORDER (ADD) WITHOUT HYPERACTIVITY: ICD-10-CM

## 2023-12-21 NOTE — TELEPHONE ENCOUNTER
Caller: My Chavez    Relationship: Self    Best call back number: [phone]     Requested Prescriptions:   Requested Prescriptions     Pending Prescriptions Disp Refills    methylphenidate 27 MG CR tablet 30 tablet 0     Sig: Take 1 tablet by mouth Every Morning    pantoprazole (PROTONIX) 40 MG EC tablet 60 tablet 11     Sig: Take 1 tablet by mouth 2 (Two) Times a Day Before Meals.        Pharmacy where request should be sent: [facility]/PHARMACY #[id number] - [place] - [address] [facility] [phone] [facility] [phone]      Last office visit with prescribing clinician: 10/11/2022   Last telemedicine visit with prescribing clinician: Visit date not found   Next office visit with prescribing clinician: Visit date not found     Additional details provided by patient: PATIENT STATED SHE NEEDS A PA ON THE PANTOPRAZOLE        Does the patient have less than a 3 day supply:  [x] Yes  [] No    Would you like a call back once the refill request has been completed: [x] Yes [] No      Antolin Frederick Rep   12/21/23 13:51 CST

## 2023-12-22 RX ORDER — METHYLPHENIDATE HYDROCHLORIDE 27 MG/1
27 TABLET ORAL EVERY MORNING
Qty: 30 TABLET | Refills: 0 | Status: SHIPPED | OUTPATIENT
Start: 2023-12-22

## 2023-12-22 RX ORDER — PANTOPRAZOLE SODIUM 40 MG/1
40 TABLET, DELAYED RELEASE ORAL
Qty: 60 TABLET | Refills: 11 | Status: SHIPPED | OUTPATIENT
Start: 2023-12-22

## 2024-01-18 ENCOUNTER — OFFICE VISIT (OUTPATIENT)
Age: 29
End: 2024-01-18
Payer: COMMERCIAL

## 2024-01-18 VITALS
DIASTOLIC BLOOD PRESSURE: 70 MMHG | WEIGHT: 293 LBS | TEMPERATURE: 97.3 F | HEIGHT: 67 IN | OXYGEN SATURATION: 98 % | RESPIRATION RATE: 20 BRPM | SYSTOLIC BLOOD PRESSURE: 110 MMHG | HEART RATE: 57 BPM | BODY MASS INDEX: 45.99 KG/M2

## 2024-01-18 DIAGNOSIS — J02.9 SORE THROAT: ICD-10-CM

## 2024-01-18 DIAGNOSIS — R52 BODY ACHES: ICD-10-CM

## 2024-01-18 DIAGNOSIS — R09.81 SINUS CONGESTION: ICD-10-CM

## 2024-01-18 DIAGNOSIS — J06.9 VIRAL UPPER RESPIRATORY ILLNESS: Primary | ICD-10-CM

## 2024-01-18 DIAGNOSIS — R05.1 ACUTE COUGH: ICD-10-CM

## 2024-01-18 LAB
INFLUENZA A ANTIBODY: NORMAL
INFLUENZA B ANTIBODY: NORMAL
S PYO AG THROAT QL: NORMAL
SARS-COV-2 N GENE RESP QL NAA+PROBE: NOT DETECTED

## 2024-01-18 PROCEDURE — 99213 OFFICE O/P EST LOW 20 MIN: CPT

## 2024-01-18 PROCEDURE — 96372 THER/PROPH/DIAG INJ SC/IM: CPT

## 2024-01-18 RX ORDER — DEXAMETHASONE SODIUM PHOSPHATE 10 MG/ML
10 INJECTION INTRAMUSCULAR; INTRAVENOUS ONCE
Status: COMPLETED | OUTPATIENT
Start: 2024-01-18 | End: 2024-01-18

## 2024-01-18 RX ORDER — BENZONATATE 100 MG/1
100 CAPSULE ORAL 3 TIMES DAILY PRN
Qty: 21 CAPSULE | Refills: 0 | Status: SHIPPED | OUTPATIENT
Start: 2024-01-18 | End: 2024-01-25

## 2024-01-18 RX ORDER — METHYLPREDNISOLONE 4 MG/1
TABLET ORAL
Qty: 1 KIT | Refills: 0 | Status: SHIPPED | OUTPATIENT
Start: 2024-01-18

## 2024-01-18 RX ADMIN — DEXAMETHASONE SODIUM PHOSPHATE 10 MG: 10 INJECTION INTRAMUSCULAR; INTRAVENOUS at 17:45

## 2024-01-18 ASSESSMENT — ENCOUNTER SYMPTOMS
COLOR CHANGE: 0
SORE THROAT: 1
SHORTNESS OF BREATH: 0
EYE DISCHARGE: 0
DIARRHEA: 0
RHINORRHEA: 0
SINUS PRESSURE: 1
WHEEZING: 0
CONSTIPATION: 0
NAUSEA: 0
ABDOMINAL PAIN: 0
VOMITING: 0
EYE ITCHING: 0
BLOOD IN STOOL: 0
COUGH: 1

## 2024-01-18 NOTE — PROGRESS NOTES
[facility]  [facility]  [address] [place] [address] [address]  [place] [address]  Dept: [phone]  Dept Fax: [phone]  Loc: [phone]    Adeola Aguilar is a 28 y.o. female who presents today for her medical conditions/complaints as noted below.  Adeola Aguilar is complaining of Congestion, Cough, Nasal Congestion, and Pharyngitis        HPI:   Sinusitis  This is a new problem. The current episode started in the past 7 days. The problem has been waxing and waning since onset. Associated symptoms include congestion, coughing, sinus pressure and a sore throat. Pertinent negatives include no chills, ear pain, headaches or shortness of breath.       Past Medical History:   Diagnosis Date    Depression     PCOS (polycystic ovarian syndrome)        Past Surgical History:   Procedure Laterality Date    CHOLECYSTECTOMY      LEG SURGERY Right     spider bite    TONSILLECTOMY         Family History   Problem Relation Age of Onset    Diabetes Father        Social History     Tobacco Use    Smoking status: Never    Smokeless tobacco: Never   Substance Use Topics    Alcohol use: Not Currently        Current Outpatient Medications   Medication Sig Dispense Refill    methylPREDNISolone (MEDROL DOSEPACK) 4 MG tablet Take by mouth. 1 kit 0    methylphenidate (RITALIN) 10 MG tablet Take 1 tablet by mouth 2 times daily.      citalopram (CELEXA) 20 MG tablet TAKE ONE TABLET DAILY 30 tablet 5    pantoprazole (PROTONIX) 40 MG tablet TAKE ONE TABLET EVERY MORNING (BEFORE BREAKFAST) 30 tablet 5    ibuprofen (ADVIL;MOTRIN) 800 MG tablet Take 1 tablet by mouth every 8 hours as needed for Pain (Patient not taking: Reported on 5/28/2023) 90 tablet 0    Prenatal MV-Min-Fe Fum-FA-DHA (PRENATAL 1 PO) Take by mouth (Patient not taking: Reported on 1/18/2024)      ondansetron (ZOFRAN ODT) 4 MG disintegrating tablet Take 1 tablet by mouth every 8 hours as needed for Nausea or Vomiting 40 tablet 2     No

## 2024-01-18 NOTE — PATIENT INSTRUCTIONS
COVID testing today; will call with results  Quarantine guidelines discussed  Medications such as zyrtec or claritin may help with symptoms. Also use OTC cough medicine like delsym if applicable.   Use tylenol/motrin as needed for body aches/fevers unless contraindicated  Multivitamin is recommended to help boost the immune system  Drink plenty of water to stay hydrated.  May use humidifier as needed.  Allow adequate rest.  Encourage deep breathing exercises  Recommended staying home until fever free for at least 24 hours without medication and symptoms are improving.  Go to the ER if you develop severe SOA, chest pain, difficulty breathing, decreased urine output or signs of dehydration, severe abdominal pain or vomiting, or you can not keep the fever below 102F with medications.

## 2024-01-30 DIAGNOSIS — F98.8 ATTENTION DEFICIT DISORDER (ADD) WITHOUT HYPERACTIVITY: ICD-10-CM

## 2024-01-30 RX ORDER — METHYLPHENIDATE HYDROCHLORIDE 27 MG/1
27 TABLET ORAL EVERY MORNING
Qty: 30 TABLET | Refills: 0 | Status: SHIPPED | OUTPATIENT
Start: 2024-01-30

## 2024-01-30 RX ORDER — PANTOPRAZOLE SODIUM 40 MG/1
40 TABLET, DELAYED RELEASE ORAL
Qty: 60 TABLET | Refills: 11 | Status: SHIPPED | OUTPATIENT
Start: 2024-01-30

## 2024-01-30 NOTE — TELEPHONE ENCOUNTER
Caller: My Chavez    Relationship: Self    Best call back number: [phone]     Requested Prescriptions:   Requested Prescriptions     Pending Prescriptions Disp Refills    methylphenidate 27 MG CR tablet 30 tablet 0     Sig: Take 1 tablet by mouth Every Morning    pantoprazole (PROTONIX) 40 MG EC tablet 60 tablet 11     Sig: Take 1 tablet by mouth 2 (Two) Times a Day Before Meals.        Pharmacy where request should be sent: [facility]/PHARMACY #[id number] - [place], [place] - [address]OSS [facility] [phone] [facility] [phone]      Last office visit with prescribing clinician: 10/11/2022   Last telemedicine visit with prescribing clinician: Visit date not found   Next office visit with prescribing clinician: 2/6/2024     Additional details provided by patient: PATIENT STATES A PRIOR AUTHORIZATION IS NEEDED FOR THE PANTOPRAZOLE.    Does the patient have less than a 3 day supply:  [] Yes  [] No    Would you like a call back once the refill request has been completed: [] Yes [] No    If the office needs to give you a call back, can they leave a voicemail: [] Yes [] No    Antolin Henry Rep   01/30/24 13:55 CST

## 2024-01-30 NOTE — TELEPHONE ENCOUNTER
[facility]  Per protocol last refilled 12/22/23   Myalgia Treatment: I explained this is common when taking isotretinoin. If this worsens they will contact us. They may try OTC ibuprofen.

## 2024-02-02 DIAGNOSIS — O99.340 DEPRESSION AFFECTING PREGNANCY: ICD-10-CM

## 2024-02-02 DIAGNOSIS — F32.A DEPRESSION AFFECTING PREGNANCY: ICD-10-CM

## 2024-02-02 RX ORDER — CITALOPRAM 20 MG/1
20 TABLET ORAL DAILY
Qty: 30 TABLET | Refills: 5 | Status: SHIPPED | OUTPATIENT
Start: 2024-02-02

## 2024-03-08 DIAGNOSIS — F98.8 ATTENTION DEFICIT DISORDER (ADD) WITHOUT HYPERACTIVITY: ICD-10-CM

## 2024-03-08 RX ORDER — METHYLPHENIDATE HYDROCHLORIDE 27 MG/1
27 TABLET ORAL EVERY MORNING
Qty: 30 TABLET | Refills: 0 | Status: SHIPPED | OUTPATIENT
Start: 2024-03-08

## 2024-03-08 RX ORDER — PANTOPRAZOLE SODIUM 40 MG/1
40 TABLET, DELAYED RELEASE ORAL
Qty: 60 TABLET | Refills: 11 | Status: SHIPPED | OUTPATIENT
Start: 2024-03-08

## 2024-03-08 NOTE — TELEPHONE ENCOUNTER
Rx Refill Note  Requested Prescriptions     Pending Prescriptions Disp Refills    methylphenidate 27 MG CR tablet 30 tablet 0     Sig: Take 1 tablet by mouth Every Morning    pantoprazole (PROTONIX) 40 MG EC tablet 60 tablet 11     Sig: Take 1 tablet by mouth 2 (Two) Times a Day Before Meals.      Last office visit with prescribing clinician: 10/11/2022   Last telemedicine visit with prescribing clinician: Visit date not found   Next office visit with prescribing clinician: Visit date not found                         Would you like a call back once the refill request has been completed: [] Yes [] No    If the office needs to give you a call back, can they leave a voicemail: [] Yes [] No    Juanita Louis, PCT  03/08/24, 16:36 CST

## 2024-03-08 NOTE — TELEPHONE ENCOUNTER
Caller: My Chavez    Relationship: Self    Best call back number: [phone]     What is the best time to reach you: ANYTIME    Who are you requesting to speak with (clinical staff, provider,  specific staff member): CLINICAL    What was the call regarding: CHECKING STATUS OF REFILL REQUESTS    Is it okay if the provider responds through MyChart: NO

## 2024-03-08 NOTE — TELEPHONE ENCOUNTER
Caller: My Chvaez    Relationship: Self    Best call back number: [phone]     Requested Prescriptions:   Requested Prescriptions     Pending Prescriptions Disp Refills    methylphenidate 27 MG CR tablet 30 tablet 0     Sig: Take 1 tablet by mouth Every Morning    pantoprazole (PROTONIX) 40 MG EC tablet 60 tablet 11     Sig: Take 1 tablet by mouth 2 (Two) Times a Day Before Meals.        Pharmacy where request should be sent: [facility]/PHARMACY #[id number] - [place], [place] - [address]OSS [facility] [phone] [facility] [phone]      Last office visit with prescribing clinician: 10/11/2022   Last telemedicine visit with prescribing clinician: Visit date not found   Next office visit with prescribing clinician: Visit date not found     Additional details provided by patient: PANTOPRAZOLE NEEDS A PA, NEEDS AN UPDATE ON THAT AND SHE IS ALMOST OUT OF THE METHYLPHENIDATE     Does the patient have less than a 3 day supply:  [x] Yes  [] No    Would you like a call back once the refill request has been completed: [x] Yes [] No    If the office needs to give you a call back, can they leave a voicemail: [x] Yes [] No    Antolin Akins Rep   03/08/24 12:44 CST

## 2024-04-10 ENCOUNTER — OFFICE VISIT (OUTPATIENT)
Dept: FAMILY MEDICINE CLINIC | Facility: CLINIC | Age: 29
End: 2024-04-10
Payer: COMMERCIAL

## 2024-04-10 VITALS
HEIGHT: 67 IN | TEMPERATURE: 97.1 F | WEIGHT: 293 LBS | HEART RATE: 53 BPM | OXYGEN SATURATION: 98 % | DIASTOLIC BLOOD PRESSURE: 64 MMHG | RESPIRATION RATE: 18 BRPM | SYSTOLIC BLOOD PRESSURE: 122 MMHG | BODY MASS INDEX: 45.99 KG/M2

## 2024-04-10 DIAGNOSIS — B34.8 PARAINFLUENZA: Primary | ICD-10-CM

## 2024-04-10 DIAGNOSIS — H66.92 OTITIS, LEFT: ICD-10-CM

## 2024-04-10 PROCEDURE — 99213 OFFICE O/P EST LOW 20 MIN: CPT | Performed by: NURSE PRACTITIONER

## 2024-04-10 RX ORDER — AMOXICILLIN AND CLAVULANATE POTASSIUM 875; 125 MG/1; MG/1
1 TABLET, FILM COATED ORAL 2 TIMES DAILY
Qty: 20 TABLET | Refills: 0 | Status: SHIPPED | OUTPATIENT
Start: 2024-04-10 | End: 2024-04-20

## 2024-04-10 RX ORDER — BENZONATATE 100 MG/1
100 CAPSULE ORAL 3 TIMES DAILY PRN
Qty: 20 CAPSULE | Refills: 0 | Status: SHIPPED | OUTPATIENT
Start: 2024-04-10

## 2024-04-10 RX ORDER — PREDNISONE 20 MG/1
20 TABLET ORAL DAILY
Qty: 5 TABLET | Refills: 0 | Status: SHIPPED | OUTPATIENT
Start: 2024-04-10 | End: 2024-04-15

## 2024-04-10 RX ORDER — ALBUTEROL SULFATE 1.25 MG/3ML
1 SOLUTION RESPIRATORY (INHALATION) EVERY 6 HOURS PRN
Qty: 120 ML | Refills: 0 | Status: SHIPPED | OUTPATIENT
Start: 2024-04-10

## 2024-04-10 NOTE — PROGRESS NOTES
Subjective   Chief Complaint:  Respiratory concern    History of Present Illness:  This 28 y.o. female was seen in the office today.    History of Present Illness  The patient presents today with cough, sputum production, wheezing, bilateral ear pain and headaches with an acute onset x2 days.    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    Review of Systems    Allergies   Allergen Reactions    Contrave [Naltrexone-Bupropion Hcl Er] Nausea Only     Nausea        Current Outpatient Medications on File Prior to Visit   Medication Sig    acetaminophen (TYLENOL) 325 MG tablet Take 2 tablets by mouth Every 6 (Six) Hours As Needed for Mild Pain (Takes 2 tablets as needed.).    ALPRAZolam (XANAX) 0.5 MG tablet Take 0.5 tablets by mouth 2 (Two) Times a Day As Needed for Anxiety. 1/2 to one    cetirizine (zyrTEC) 10 MG tablet Take 1 tablet by mouth Daily.    citalopram (CeleXA) 20 MG tablet Daily.    ibuprofen (ADVIL,MOTRIN) 800 MG tablet Take 1 tablet by mouth Every 8 (Eight) Hours As Needed.    methylphenidate 27 MG CR tablet Take 1 tablet by mouth Every Morning    pantoprazole (PROTONIX) 40 MG EC tablet Take 1 tablet by mouth 2 (Two) Times a Day Before Meals.     No current facility-administered medications on file prior to visit.      Past Medical, Surgical, Social, and Family History:  Past Medical History:   Diagnosis Date    Abdominal pain     Acid reflux     Anemia     Anxiety     Asthma     POLLEN CAN CAUSE SYMPTOMS NO ASTHMA FOR 8 YEARS    Depression     Diarrhea     Gallbladder abscess     GERD (gastroesophageal reflux disease)     Nausea and/or vomiting     Seasonal allergies      Past Surgical History:   Procedure Laterality Date    ADENOIDECTOMY      CHOLECYSTECTOMY  04/13/2018    CHOLECYSTECTOMY WITH INTRAOPERATIVE CHOLANGIOGRAM N/A 4/13/2018    Procedure: LAPAROSCOPIC CHOLECYSTECTOMY  WITH CHOLANGIOGRAM;   "Surgeon: Martita Gunter MD;  Location: [facility] OR;  Service: General    COLONOSCOPY N/A 10/2/2017    Procedure: COLONOSCOPY WITH ANESTHESIA;  Surgeon: Manoj Madden DO;  Location: [facility] ENDOSCOPY;  Service:     ENDOSCOPY N/A 10/2/2017    Procedure: ESOPHAGOGASTRODUODENOSCOPY WITH ANESTHESIA;  Surgeon: Manoj Madden DO;  Location: [facility] ENDOSCOPY;  Service:     ENDOSCOPY N/A 3/8/2023    Procedure: ESOPHAGOGASTRODUODENOSCOPY WITH ANESTHESIA;  Surgeon: Manoj Madden DO;  Location: [facility] ENDOSCOPY;  Service: Gastroenterology;  Laterality: N/A;  pre dysphagia  post r/o eosiniphilic esophagitis  dr vinay guerra    LEG SURGERY      TONSILLECTOMY       Social History     Socioeconomic History    Marital status: Single    Number of children: 0    Years of education: 14   Tobacco Use    Smoking status: Former     Current packs/day: 0.00     Average packs/day: 0.1 packs/day for 2.1 years (0.2 ttl pk-yrs)     Types: Cigarettes     Start date: 2014     Quit date: 10/27/2016     Years since quittin.4    Smokeless tobacco: Never   Vaping Use    Vaping status: Never Used   Substance and Sexual Activity    Alcohol use: Yes     Alcohol/week: 1.0 standard drink of alcohol     Types: 1 Glasses of wine per week     Comment: occ     Drug use: No    Sexual activity: Yes     Partners: Male     Birth control/protection: Condom     Family History   Problem Relation Age of Onset    Hypertension Mother     Hypertension Father     No Known Problems Brother     Breast cancer Paternal Grandmother     Colon cancer Neg Hx     Esophageal cancer Neg Hx        Prior Visit Notes/Records, Lab, Imaging, and Diagnostic Results Reviewed:  A1C:No results for input(s): \"HGBA1C\" in the last 41988 hours.  GLUCOSE:No results for input(s): \"GLUCOSE\" in the last 67291 hours.  LIPID:No results for input(s): \"CHLPL\", \"LDL\", \"HDL\", \"TRIG\" in the last 98943 hours.  PSA:No results for input(s): \"PSA\" in the last 65323 hours.  CBC:No results " "for input(s): \"WBC\", \"HGB\", \"HCT\", \"PLT\", \"IRONSERUM\", \"IRON\" in the last 61593 hours.   BMP/CMP:No results for input(s): \"NA\", \"K\", \"CL\", \"CO2\", \"GLUCOSE\", \"BUN\", \"CREATININE\", \"EGFRIFNONA\", \"EGFRIFAFRI\", \"EGFRRESULT\", \"CALCIUM\" in the last 02050 hours.  HEPATIC:No results for input(s): \"ALT\", \"AST\", \"ALKPHOS\", \"TOTAL\" in the last 25310 hours.    Invalid input(s): \"HEPATITSC\"  Vit D:No results for input(s): \"FJTH68WB\" in the last 00955 hours.  THYROID:No results for input(s): \"TSH\", \"FREET4\", \"FTI\" in the last 63452 hours.    Invalid input(s): \"FREET3\", \"T3\", \"T4\", \"TEUP\", \"TOTALT4\"  Results  Laboratory Studies  Positive for parainfluenza 3.  BMI Trend:  BMI Readings from Last 10 Encounters:   04/10/24 49.52 kg/m²   09/30/23 47.46 kg/m²   05/15/23 47.77 kg/m²   03/08/23 46.05 kg/m²   02/21/23 46.05 kg/m²   12/20/22 46.05 kg/m²   10/11/22 45.30 kg/m²   11/03/21 43.45 kg/m²   10/18/21 42.91 kg/m²   10/12/21 43.07 kg/m²     Objective   Vital Signs  /64 (BP Location: Left arm, Patient Position: Sitting, Cuff Size: Large Adult)   Pulse 53   Temp 97.1 °F (36.2 °C) (Infrared)   Resp 18   Ht 170.2 cm (67\")   Wt (!) 143 kg (316 lb 3.2 oz)   SpO2 98%   BMI 49.52 kg/m²   Physical Exam  Vitals reviewed.   Constitutional:       General: She is not in acute distress.     Appearance: Normal appearance.   HENT:      Right Ear: Tympanic membrane and ear canal normal.      Left Ear: Tympanic membrane is erythematous and bulging.   Cardiovascular:      Rate and Rhythm: Normal rate and regular rhythm.   Pulmonary:      Effort: Pulmonary effort is normal.      Breath sounds: Normal breath sounds.       Physical Exam    Assessment & Plan   Diagnoses and all orders for this visit:    1. Parainfluenza (Primary)  -     predniSONE (DELTASONE) 20 MG tablet; Take 1 tablet by mouth Daily for 5 days.  Dispense: 5 tablet; Refill: 0    2. Otitis, left    Other orders  -     amoxicillin-clavulanate (AUGMENTIN) 875-125 MG per tablet; " Take 1 tablet by mouth 2 (Two) Times a Day for 10 days.  Dispense: 20 tablet; Refill: 0  -     benzonatate (Tessalon Perles) 100 MG capsule; Take 1 capsule by mouth 3 (Three) Times a Day As Needed for Cough.  Dispense: 20 capsule; Refill: 0  -     albuterol (ACCUNEB) 1.25 MG/3ML nebulizer solution; Take 3 mL by nebulization Every 6 (Six) Hours As Needed for Shortness of Air or Wheezing.  Dispense: 120 mL; Refill: 0    Discussions & Anticipatory Guidance:  Advised and educated plan of care.    The patient will Return for follow-up as needed.   Assessment & Plan    Patient or patient representative verbalized consent for the use of Ambient Listening during the visit with  DEREK Durant for chart documentation. 4/10/2024  13:27 CDT  Electronically signed by DEREK Durant, 04/10/24, 1:26 PM CDT.

## 2024-04-15 DIAGNOSIS — F98.8 ATTENTION DEFICIT DISORDER (ADD) WITHOUT HYPERACTIVITY: ICD-10-CM

## 2024-04-15 NOTE — TELEPHONE ENCOUNTER
Caller: My Chavez    Relationship: Self    Best call back number: [phone]     Requested Prescriptions:   Requested Prescriptions     Pending Prescriptions Disp Refills    methylphenidate 27 MG CR tablet 30 tablet 0     Sig: Take 1 tablet by mouth Every Morning        Pharmacy where request should be sent: [facility]/PHARMACY #[id number] - [place], [place] - [address] AT ACROSS FROM [facility] [phone] [facility] [phone]      Last office visit with prescribing clinician: 10/11/2022   Last telemedicine visit with prescribing clinician: Visit date not found   Next office visit with prescribing clinician: Visit date not found     Additional details provided by patient: COMPLETELY OUT    Does the patient have less than a 3 day supply:  [x] Yes  [] No    Would you like a call back once the refill request has been completed: [] Yes [x] No    If the office needs to give you a call back, can they leave a voicemail: [] Yes [x] No    Antolin Javier Rep   04/15/24 14:01 CDT

## 2024-04-15 NOTE — TELEPHONE ENCOUNTER
Caller: My Chavez    Relationship: Self    Best call back number:     [phone] (Mobile)     What medication are you requesting: ANTIFUNGAL    What are your current symptoms: THE PATIENT STATES THAT SHE DEVELOPED A RASH AND DISCHARGE AND STATES THAT SHE THINKS THAT SHE HAS A YEAST INFECTION FROM THE ANTIBIOTIC.     How long have you been experiencing symptoms:  THREE DAYS AGO    If a prescription is needed, what is your preferred pharmacy and phone number: [facility]/PHARMACY #[id number] - [place], [place] - [address] AT ACROSS FROM [place]  [phone] [facility] [phone] FX

## 2024-04-16 RX ORDER — METHYLPHENIDATE HYDROCHLORIDE 27 MG/1
27 TABLET ORAL EVERY MORNING
Qty: 30 TABLET | Refills: 0 | Status: SHIPPED | OUTPATIENT
Start: 2024-04-16

## 2024-04-16 RX ORDER — FLUCONAZOLE 100 MG/1
100 TABLET ORAL DAILY
Qty: 2 TABLET | Refills: 0 | Status: SHIPPED | OUTPATIENT
Start: 2024-04-16 | End: 2024-04-18

## 2024-04-23 ENCOUNTER — PATIENT MESSAGE (OUTPATIENT)
Dept: FAMILY MEDICINE CLINIC | Facility: CLINIC | Age: 29
End: 2024-04-23
Payer: COMMERCIAL

## 2024-04-23 RX ORDER — CIPROFLOXACIN HYDROCHLORIDE 3.5 MG/ML
1 SOLUTION/ DROPS TOPICAL 4 TIMES DAILY
Qty: 2.5 ML | Refills: 0 | Status: SHIPPED | OUTPATIENT
Start: 2024-04-23 | End: 2024-04-24 | Stop reason: SDUPTHER

## 2024-04-24 RX ORDER — CIPROFLOXACIN HYDROCHLORIDE 3.5 MG/ML
1 SOLUTION/ DROPS TOPICAL 4 TIMES DAILY
Qty: 2.5 ML | Refills: 0 | OUTPATIENT
Start: 2024-04-24

## 2024-04-24 RX ORDER — CIPROFLOXACIN HYDROCHLORIDE 3.5 MG/ML
1 SOLUTION/ DROPS TOPICAL 4 TIMES DAILY
Qty: 2.5 ML | Refills: 0 | Status: SHIPPED | OUTPATIENT
Start: 2024-04-24

## 2024-04-24 NOTE — TELEPHONE ENCOUNTER
Caller: My Chavez    Relationship: Self    Best call back number: [phone]     Requested Prescriptions:   Requested Prescriptions     Pending Prescriptions Disp Refills    ciprofloxacin (Ciloxan) 0.3 % ophthalmic solution 2.5 mL 0     Sig: Administer 1 drop to both eyes 4 (Four) Times a Day.        Pharmacy where request should be sent: [facility]/PHARMACY #[id number] - [place], [place] - [address] AT ACROSS FROM [facility] [phone] [facility] [phone]      Last office visit with prescribing clinician: 4/10/2024   Last telemedicine visit with prescribing clinician: Visit date not found   Next office visit with prescribing clinician: Visit date not found     Additional details provided by patient: PLEASE CANCEL AT [facility] AND RESEND TO [facility].     Does the patient have less than a 3 day supply:  [x] Yes  [] No    Would you like a call back once the refill request has been completed: [] Yes [x] No    If the office needs to give you a call back, can they leave a voicemail: [] Yes [x] No    Antolin Javier Rep   04/24/24 12:30 CDT

## 2024-05-20 DIAGNOSIS — F98.8 ATTENTION DEFICIT DISORDER (ADD) WITHOUT HYPERACTIVITY: ICD-10-CM

## 2024-05-20 RX ORDER — METHYLPHENIDATE HYDROCHLORIDE 27 MG/1
27 TABLET ORAL EVERY MORNING
Qty: 30 TABLET | Refills: 0 | Status: SHIPPED | OUTPATIENT
Start: 2024-05-20

## 2024-05-20 NOTE — TELEPHONE ENCOUNTER
Caller: My Chavez    Relationship: Self    Best call back number: [phone]    Requested Prescriptions:   Requested Prescriptions     Pending Prescriptions Disp Refills    methylphenidate 27 MG CR tablet 30 tablet 0     Sig: Take 1 tablet by mouth Every Morning        Pharmacy where request should be sent:  [phone]    Last office visit with prescribing clinician: 10/11/2022   Last telemedicine visit with prescribing clinician: Visit date not found   Next office visit with prescribing clinician: Visit date not found     Does the patient have less than a 3 day supply:  [] Yes  [x] No    Would you like a call back once the refill request has been completed: [] Yes [x] No    If the office needs to give you a call back, can they leave a voicemail: [] Yes [x] No    Antolin Taylor Rep   05/20/24 14:18 CDT

## 2024-06-25 ENCOUNTER — TELEPHONE (OUTPATIENT)
Dept: FAMILY MEDICINE CLINIC | Facility: CLINIC | Age: 29
End: 2024-06-25
Payer: COMMERCIAL

## 2024-06-25 DIAGNOSIS — F98.8 ATTENTION DEFICIT DISORDER (ADD) WITHOUT HYPERACTIVITY: ICD-10-CM

## 2024-06-25 RX ORDER — METHYLPHENIDATE HYDROCHLORIDE 27 MG/1
27 TABLET ORAL EVERY MORNING
Qty: 30 TABLET | Refills: 0 | Status: SHIPPED | OUTPATIENT
Start: 2024-06-25

## 2024-06-25 NOTE — TELEPHONE ENCOUNTER
Caller: My Chavez    Relationship: Self    Best call back number: [phone]     Requested Prescriptions:   Requested Prescriptions     Pending Prescriptions Disp Refills    methylphenidate 27 MG CR tablet 30 tablet 0     Sig: Take 1 tablet by mouth Every Morning        Pharmacy where request should be sent: [facility]/PHARMACY #[id number] - [place], [place] - [address] AT ACROSS FROM [facility] [phone] [facility] [phone]      Last office visit with prescribing clinician: 10/11/2022   Last telemedicine visit with prescribing clinician: Visit date not found   Next office visit with prescribing clinician: Visit date not found     Additional details provided by patient:     Does the patient have less than a 3 day supply:  [x] Yes  [] No    Would you like a call back once the refill request has been completed: [x] Yes [] No    If the office needs to give you a call back, can they leave a voicemail: [x] Yes [] No    Bartolo Prater III, RegSched Rep   06/25/24 09:55 CDT

## 2024-06-25 NOTE — TELEPHONE ENCOUNTER
Rx Refill Note  Requested Prescriptions     Pending Prescriptions Disp Refills    methylphenidate 27 MG CR tablet 30 tablet 0     Sig: Take 1 tablet by mouth Every Morning      Last office visit with office: 04/10/2024  Next office visit with office: none    UDS: none    DATE OF LAST REFILL: 05/21/2024    Controlled Substance Agreement: not up to date    QUIN OR ANDIE: richard         {TIP  Is Refill Pharmacy correct?:  Kirsten Castelan MA  06/25/24, 10:30 CDT

## 2024-08-02 ENCOUNTER — TELEPHONE (OUTPATIENT)
Dept: FAMILY MEDICINE CLINIC | Facility: CLINIC | Age: 29
End: 2024-08-02
Payer: COMMERCIAL

## 2024-08-02 DIAGNOSIS — F98.8 ATTENTION DEFICIT DISORDER (ADD) WITHOUT HYPERACTIVITY: ICD-10-CM

## 2024-08-02 RX ORDER — METHYLPHENIDATE HYDROCHLORIDE 27 MG/1
27 TABLET ORAL EVERY MORNING
Qty: 30 TABLET | Refills: 0 | Status: SHIPPED | OUTPATIENT
Start: 2024-08-02

## 2024-08-02 NOTE — TELEPHONE ENCOUNTER
Rx Refill Note  Requested Prescriptions     Pending Prescriptions Disp Refills    methylphenidate 27 MG CR tablet 30 tablet 0     Sig: Take 1 tablet by mouth Every Morning      Last office visit with office: 04/10/2024  Next office visit with office: none    UDS: none    DATE OF LAST REFILL: 06/25/2024    Controlled Substance Agreement: not up to date    QUIN OR ANDIE: richard         {TIP  Is Refill Pharmacy correct?:  Kirsten Castelan MA  08/02/24, 12:10 CDT

## 2024-08-02 NOTE — TELEPHONE ENCOUNTER
Caller: My Chavez    Relationship: Self    Best call back number: [phone]     Requested Prescriptions:   Requested Prescriptions     Pending Prescriptions Disp Refills    methylphenidate 27 MG CR tablet 30 tablet 0     Sig: Take 1 tablet by mouth Every Morning        Pharmacy where request should be sent: [facility]/PHARMACY #[id number] - [place], [place] - [address] AT ACROSS FROM [facility] [phone] [facility] [phone]      Last office visit with prescribing clinician: 10/11/2022   Last telemedicine visit with prescribing clinician: Visit date not found   Next office visit with prescribing clinician: Visit date not found     Additional details provided by patient:     Does the patient have less than a 3 day supply:  [x] Yes  [] No    Would you like a call back once the refill request has been completed: [x] Yes [] No    If the office needs to give you a call back, can they leave a voicemail: [x] Yes [] No    Bartolo Prater III, RegSched Rep   08/02/24 11:50 CDT

## 2024-09-03 DIAGNOSIS — F98.8 ATTENTION DEFICIT DISORDER (ADD) WITHOUT HYPERACTIVITY: ICD-10-CM

## 2024-09-03 RX ORDER — METHYLPHENIDATE HYDROCHLORIDE 27 MG/1
27 TABLET ORAL EVERY MORNING
Qty: 30 TABLET | Refills: 0 | Status: SHIPPED | OUTPATIENT
Start: 2024-09-03

## 2024-09-03 NOTE — TELEPHONE ENCOUNTER
Caller: My Chavez    Relationship: Self    Best call back number: [phone]     Requested Prescriptions:   Requested Prescriptions     Pending Prescriptions Disp Refills    methylphenidate 27 MG CR tablet 30 tablet 0     Sig: Take 1 tablet by mouth Every Morning        Pharmacy where request should be sent: [facility]/PHARMACY #[id number] - [place], [place] - [address] AT ACROSS FROM [facility] [phone] [facility] [phone]      Last office visit with prescribing clinician: 4/10/2024   Last telemedicine visit with prescribing clinician: Visit date not found   Next office visit with prescribing clinician: Visit date not found     Additional details provided by patient:     Does the patient have less than a 3 day supply:  [x] Yes  [] No    Would you like a call back once the refill request has been completed: [x] Yes [] No    If the office needs to give you a call back, can they leave a voicemail: [x] Yes [] No    Bartolo Prater III, RegSched Rep   09/03/24 13:49 CDT

## 2024-09-03 NOTE — TELEPHONE ENCOUNTER
Rx Refill Note  Requested Prescriptions     Pending Prescriptions Disp Refills    methylphenidate 27 MG CR tablet 30 tablet 0     Sig: Take 1 tablet by mouth Every Morning      Last office visit with office: 04/10/24  Next office visit with office: none    UDS: 07/22    DATE OF LAST REFILL: 08/02/24    Controlled Substance Agreement: not up to date    QUIN OR MADHUP: 08/02/24         {TIP  Is Refill Pharmacy correct?:  Pita Will MA  09/03/24, 14:01 CDT

## 2024-09-25 RX ORDER — CITALOPRAM HYDROBROMIDE 20 MG/1
20 TABLET ORAL DAILY
Qty: 30 TABLET | Refills: 5 | Status: SHIPPED | OUTPATIENT
Start: 2024-09-25

## 2024-09-25 RX ORDER — PANTOPRAZOLE SODIUM 40 MG/1
40 TABLET, DELAYED RELEASE ORAL
Qty: 60 TABLET | Refills: 11 | Status: SHIPPED | OUTPATIENT
Start: 2024-09-25

## 2024-10-03 DIAGNOSIS — F98.8 ATTENTION DEFICIT DISORDER (ADD) WITHOUT HYPERACTIVITY: ICD-10-CM

## 2024-10-03 RX ORDER — METHYLPHENIDATE HYDROCHLORIDE 27 MG/1
27 TABLET ORAL EVERY MORNING
Qty: 30 TABLET | Refills: 0 | Status: SHIPPED | OUTPATIENT
Start: 2024-10-03

## 2024-10-03 NOTE — TELEPHONE ENCOUNTER
Rx Refill Note  Requested Prescriptions     Pending Prescriptions Disp Refills    methylphenidate 27 MG CR tablet 30 tablet 0     Sig: Take 1 tablet by mouth Every Morning      Last office visit with office: 04/10/2024  Next office visit with office: NONE    UDS: none    DATE OF LAST REFILL: 09/03/2024    Controlled Substance Agreement: not up to date    QUIN OR ANDIE: richard         {TIP  Is Refill Pharmacy correct?:  Kirsten Castelan MA  10/03/24, 11:59 CDT

## 2024-10-03 NOTE — TELEPHONE ENCOUNTER
Caller: My Chavez    Relationship: Self    Best call back number: [phone]     Requested Prescriptions:   Requested Prescriptions     Pending Prescriptions Disp Refills    methylphenidate 27 MG CR tablet 30 tablet 0     Sig: Take 1 tablet by mouth Every Morning        Pharmacy where request should be sent: [facility]/PHARMACY #[id number] - [place], [place] - [address] AT ACROSS FROM [facility] [phone] [facility] [phone]      Last office visit with prescribing clinician: 4/10/2024   Last telemedicine visit with prescribing clinician: Visit date not found   Next office visit with prescribing clinician: Visit date not found     Additional details provided by patient: PATIENT STATED SHE HAS TWO DAYS LEFT OF THIS MEDICATION    Does the patient have less than a 3 day supply:  [x] Yes  [] No      Antolin Jay Rep   10/03/24 11:53 CDT

## 2024-10-08 NOTE — ANESTHESIA PREPROCEDURE EVALUATION
Anesthesia Evaluation     Patient summary reviewed   no history of anesthetic complications:  NPO Solid Status: > 8 hours             Airway   Mallampati: I  TM distance: >3 FB  Neck ROM: full  No difficulty expected  Dental - normal exam     Pulmonary    (+) asthma,  Cardiovascular - negative cardio ROS  Exercise tolerance: good (4-7 METS)        Neuro/Psych- negative ROS  GI/Hepatic/Renal/Endo    (+) morbid obesity, GERD,      Musculoskeletal     Abdominal    Substance History      OB/GYN          Other                        Anesthesia Plan    ASA 3     MAC     intravenous induction     Anesthetic plan, risks, benefits, and alternatives have been provided, discussed and informed consent has been obtained with: patient.        CODE STATUS:        Subjective:   I, Errol Mayer , attest that this documentation has been prepared under the direction and in the presence of Steven Campos MD.     Patient ID: Jordin Allen Jr. is a 77 y.o. male     Chief Complaint: No chief complaint on file.      HPI  MsGreg Allen Jr. is a 77 y.o. gentleman with h/o HTN, SHOLA, COPD, adenocarcinoma of the lungs  who recently presented to the ED with a 3 day history of hypotension and dizziness s/p radiosurgery done on 8/2/24. He received 27Gy to the 51% isodose line to be delivered in three total fractions at the left thalamic tumor site. After stabilizing the pt's blood pressure, the pt was worked up in the ER and MRI imaging revealed a cystic lesion measuring 2.2cm in the left thalamus with peripheral enhancement.  The pt reports that he is not feeling well. He had lost his appetite and experienced nausea.  His nausea is relieved by Zofran. He states that although his nausea has improved, he still maintains little appetite. MRI BRAIN W WO CONTRAST 7/29/24: Increased size of peripherally enhancing cystic lesion in the left thalamus, now measuring 2.2 cm.  In light of the patient's history, finding remains concerning for cystic metastasis.  No new lesion.  Only mild edema without midline shift. I have recommended SRS gamma knife (focused radiation). I have discussed the risks/benefits, indications, and alternatives for the proposed procedure in detail. I have answered all of their questions and patient wish to proceed with radiosurgery.    Today the pt reports he has lost his appetite. He is currently taking Periactin for appetite stimulation. He denies headaches.      Review of Systems   Constitutional:  Positive for appetite change. Negative for activity change, fatigue, fever and unexpected weight change.   HENT:  Negative for facial swelling.    Eyes: Negative.    Respiratory: Negative.     Cardiovascular: Negative.    Gastrointestinal:  Negative for diarrhea,  nausea and vomiting.   Endocrine: Negative.    Genitourinary: Negative.    Musculoskeletal:  Negative for back pain, joint swelling, myalgias and neck pain.   Neurological:  Negative for dizziness, seizures, weakness, numbness and headaches.   Psychiatric/Behavioral: Negative.          Past Medical History:   Diagnosis Date    Benign neoplasm of colon 10/01/2013    Bronchitis chronic     CAD (coronary artery disease) 10/31/2013    COPD (chronic obstructive pulmonary disease)     Emphysema of lung     GERD (gastroesophageal reflux disease)     Hx of colonic polyps     Hypertension     NAFLD (nonalcoholic fatty liver disease) 03/27/2017    SHOLA on CPAP     RLS (restless legs syndrome)     Screen for colon cancer 08/30/2013       Objective:    There were no vitals filed for this visit.   Physical Exam  Constitutional:       General: He is not in acute distress.     Appearance: Normal appearance.   HENT:      Head: Normocephalic and atraumatic.   Pulmonary:      Effort: Pulmonary effort is normal.   Musculoskeletal:      Cervical back: Neck supple.   Neurological:      Mental Status: He is alert and oriented to person, place, and time.      GCS: GCS eye subscore is 4. GCS verbal subscore is 5. GCS motor subscore is 6.      Cranial Nerves: No cranial nerve deficit.       IMAGING:  MRI Brain W WO Contrast 10/7/2024:  Interval decrease in size of the left thalamic lesion with improved edema and mass effect. New enhancing lesion at the right temporal occipital junction as above, presumed metastasis.     I have personally reviewed the images with the pt.      I, Dr. Steven Campos, personally performed the services described in this documentation. All medical record entries made by the scribe, Errol Mayer, were at my direction and in my presence.  I have reviewed the chart and agree that the record reflects my personal performance and is accurate and complete. Steven Campos MD. 10/08/2024    Assessment:   1. Lung cancer.     2. Brain metastases.       Plan:   I have personally reviewed the MRI Brain W WO Contrast with the pt which shows Interval decrease in size of the left thalamic lesion with improved edema and mass effect. New enhancing lesion at the right temporal occipital junction as above, presumed metastasis.     I recommend gamma knife (focused radiation) to the new enhancing lesion at the right temporal occipital junction. I have discussed the risks/benefits, indications, and alternatives for the proposed procedure in detail. I have answered all of their questions and patient wish to proceed with radiosurgery. We will schedule patient.    I will discuss with my partner, Dr. OSWALDO Henson (rad oncologist) for co-treatment planning.

## 2024-11-04 DIAGNOSIS — F98.8 ATTENTION DEFICIT DISORDER (ADD) WITHOUT HYPERACTIVITY: ICD-10-CM

## 2024-11-04 RX ORDER — METHYLPHENIDATE HYDROCHLORIDE 27 MG/1
27 TABLET ORAL EVERY MORNING
Qty: 30 TABLET | Refills: 0 | Status: SHIPPED | OUTPATIENT
Start: 2024-11-04

## 2024-11-04 RX ORDER — CITALOPRAM HYDROBROMIDE 20 MG/1
20 TABLET ORAL DAILY
Qty: 30 TABLET | Refills: 5 | Status: SHIPPED | OUTPATIENT
Start: 2024-11-04

## 2024-11-04 NOTE — TELEPHONE ENCOUNTER
Caller: My Chavez    Relationship: Self    Best call back number: [phone]    Requested Prescriptions:   Requested Prescriptions     Pending Prescriptions Disp Refills    methylphenidate 27 MG CR tablet 30 tablet 0     Sig: Take 1 tablet by mouth Every Morning    citalopram (CeleXA) 20 MG tablet 30 tablet 5     Sig: Take 1 tablet by mouth Daily.        Pharmacy where request should be sent: [facility]/PHARMACY #[id number] - [place], [place] - [address] [phone] [facility] [phone]      Last office visit with prescribing clinician: 4/10/2024   Last telemedicine visit with prescribing clinician: Visit date not found   Next office visit with prescribing clinician: Visit date not found         Does the patient have less than a 3 day supply:  [x] Yes  [] No    Would you like a call back once the refill request has been completed: [] Yes [x] No        Antolin Azul Rep   11/04/24 14:00 CST

## 2024-11-04 NOTE — TELEPHONE ENCOUNTER
Rx Refill Note  Requested Prescriptions     Pending Prescriptions Disp Refills    methylphenidate 27 MG CR tablet 30 tablet 0     Sig: Take 1 tablet by mouth Every Morning    citalopram (CeleXA) 20 MG tablet 30 tablet 5     Sig: Take 1 tablet by mouth Daily.      Last office visit with office: 04/10/24  Next office visit with office: 11/26    UDS: not on file     DATE OF LAST REFILL: 10/03/24    Controlled Substance Agreement: not up to date    QUIN OR ANDIE: ANDIE WNL          {TIP  Is Refill Pharmacy correct?:  Elba Saucedo MA  11/04/24, 15:32 CST

## 2024-12-06 DIAGNOSIS — F98.8 ATTENTION DEFICIT DISORDER (ADD) WITHOUT HYPERACTIVITY: ICD-10-CM

## 2024-12-06 NOTE — TELEPHONE ENCOUNTER
Caller: My Chavez    Relationship: Self    Best call back number: [phone]     Requested Prescriptions:   Requested Prescriptions     Pending Prescriptions Disp Refills    methylphenidate 27 MG CR tablet 30 tablet 0     Sig: Take 1 tablet by mouth Every Morning        Pharmacy where request should be sent: [facility]/PHARMACY #[id number] - [place], [place] - [address] AT ACROSS FROM [facility] [phone] [facility] [phone]      Last office visit with prescribing clinician: 4/10/2024   Last telemedicine visit with prescribing clinician: Visit date not found   Next office visit with prescribing clinician: Visit date not found     Additional details provided by patient:     Does the patient have less than a 3 day supply:  [x] Yes  [] No    Would you like a call back once the refill request has been completed: [x] Yes [] No    If the office needs to give you a call back, can they leave a voicemail: [] Yes [] No    Antolin Frederick Rep   12/06/24 11:40 CST

## 2024-12-09 ENCOUNTER — TELEPHONE (OUTPATIENT)
Dept: FAMILY MEDICINE CLINIC | Facility: CLINIC | Age: 29
End: 2024-12-09

## 2024-12-09 RX ORDER — METHYLPHENIDATE HYDROCHLORIDE 27 MG/1
27 TABLET ORAL EVERY MORNING
Qty: 30 TABLET | Refills: 0 | Status: SHIPPED | OUTPATIENT
Start: 2024-12-09

## 2024-12-09 NOTE — TELEPHONE ENCOUNTER
Rx Refill Note  Requested Prescriptions     Pending Prescriptions Disp Refills    methylphenidate 27 MG CR tablet 30 tablet 0     Sig: Take 1 tablet by mouth Every Morning      Last office visit with office: 04/10/2024  Next office visit with office: none    UDS: none    DATE OF LAST REFILL: 11/04/2024    Controlled Substance Agreement: not up to date    QUIN OR ANDIE: richard         {TIP  Is Refill Pharmacy correct?:  Kirsten Castelan MA  12/09/24, 15:48 CST

## 2024-12-09 NOTE — TELEPHONE ENCOUNTER
Caller: My Chavez    Relationship to patient: Self    Best call back number: [phone]     Chief complaint: WOULD LIKE TO TALK ABOUT LAB RESULTS    Type of visit: OFFICE VISIT    Requested date: ASAP     If rescheduling, when is the original appointment: NA     Additional notes:NEEDING AN APPOINTMENT TO DISCUSS LABS

## 2024-12-10 ENCOUNTER — HOSPITAL ENCOUNTER (EMERGENCY)
Facility: HOSPITAL | Age: 29
Discharge: HOME | End: 2024-12-10
Admitting: EMERGENCY MEDICINE
Payer: COMMERCIAL

## 2024-12-10 ENCOUNTER — APPOINTMENT (OUTPATIENT)
Dept: GENERAL RADIOLOGY | Facility: HOSPITAL | Age: 29
End: 2024-12-10
Payer: COMMERCIAL

## 2024-12-10 VITALS
TEMPERATURE: 98.3 F | WEIGHT: 293 LBS | DIASTOLIC BLOOD PRESSURE: 56 MMHG | RESPIRATION RATE: 20 BRPM | SYSTOLIC BLOOD PRESSURE: 120 MMHG | BODY MASS INDEX: 45.99 KG/M2 | OXYGEN SATURATION: 98 % | HEART RATE: 53 BPM | HEIGHT: 67 IN

## 2024-12-10 DIAGNOSIS — B33.8 RSV INFECTION: Primary | ICD-10-CM

## 2024-12-10 DIAGNOSIS — R06.2 WHEEZE: ICD-10-CM

## 2024-12-10 LAB
B PARAPERT DNA SPEC QL NAA+PROBE: NOT DETECTED
B PERT DNA SPEC QL NAA+PROBE: NOT DETECTED
C PNEUM DNA NPH QL NAA+NON-PROBE: NOT DETECTED
FLUAV SUBTYP SPEC NAA+PROBE: NOT DETECTED
FLUBV RNA ISLT QL NAA+PROBE: NOT DETECTED
HADV DNA SPEC NAA+PROBE: NOT DETECTED
HCOV 229E RNA SPEC QL NAA+PROBE: NOT DETECTED
HCOV HKU1 RNA SPEC QL NAA+PROBE: NOT DETECTED
HCOV NL63 RNA SPEC QL NAA+PROBE: NOT DETECTED
HCOV OC43 RNA SPEC QL NAA+PROBE: NOT DETECTED
HMPV RNA NPH QL NAA+NON-PROBE: NOT DETECTED
HPIV1 RNA ISLT QL NAA+PROBE: NOT DETECTED
HPIV2 RNA SPEC QL NAA+PROBE: NOT DETECTED
HPIV3 RNA NPH QL NAA+PROBE: NOT DETECTED
HPIV4 P GENE NPH QL NAA+PROBE: NOT DETECTED
M PNEUMO IGG SER IA-ACNC: NOT DETECTED
RHINOVIRUS RNA SPEC NAA+PROBE: NOT DETECTED
RSV RNA NPH QL NAA+NON-PROBE: DETECTED
SARS-COV-2 RNA RESP QL NAA+PROBE: NOT DETECTED

## 2024-12-10 PROCEDURE — 71045 X-RAY EXAM CHEST 1 VIEW: CPT

## 2024-12-10 PROCEDURE — 0202U NFCT DS 22 TRGT SARS-COV-2: CPT | Performed by: PHYSICIAN ASSISTANT

## 2024-12-10 PROCEDURE — 94640 AIRWAY INHALATION TREATMENT: CPT

## 2024-12-10 PROCEDURE — 96372 THER/PROPH/DIAG INJ SC/IM: CPT

## 2024-12-10 PROCEDURE — 99283 EMERGENCY DEPT VISIT LOW MDM: CPT

## 2024-12-10 PROCEDURE — 25010000002 DEXAMETHASONE PER 1 MG: Performed by: PHYSICIAN ASSISTANT

## 2024-12-10 RX ORDER — DEXAMETHASONE SODIUM PHOSPHATE 10 MG/ML
10 INJECTION INTRAMUSCULAR; INTRAVENOUS ONCE
Status: COMPLETED | OUTPATIENT
Start: 2024-12-10 | End: 2024-12-10

## 2024-12-10 RX ORDER — HYDROCODONE POLISTIREX AND CHLORPHENIRAMINE POLISTIREX 10; 8 MG/5ML; MG/5ML
5 SUSPENSION, EXTENDED RELEASE ORAL ONCE
Status: COMPLETED | OUTPATIENT
Start: 2024-12-10 | End: 2024-12-10

## 2024-12-10 RX ORDER — ALBUTEROL SULFATE 0.83 MG/ML
2.5 SOLUTION RESPIRATORY (INHALATION) ONCE
Status: COMPLETED | OUTPATIENT
Start: 2024-12-10 | End: 2024-12-10

## 2024-12-10 RX ORDER — ALBUTEROL SULFATE 1.25 MG/3ML
1 SOLUTION RESPIRATORY (INHALATION) EVERY 6 HOURS PRN
Qty: 30 EACH | Refills: 0 | Status: SHIPPED | OUTPATIENT
Start: 2024-12-10

## 2024-12-10 RX ORDER — METHYLPREDNISOLONE 4 MG/1
TABLET ORAL
Qty: 21 EACH | Refills: 0 | Status: SHIPPED | OUTPATIENT
Start: 2024-12-10

## 2024-12-10 RX ORDER — BROMPHENIRAMINE MALEATE, PSEUDOEPHEDRINE HYDROCHLORIDE, AND DEXTROMETHORPHAN HYDROBROMIDE 2; 30; 10 MG/5ML; MG/5ML; MG/5ML
5 SYRUP ORAL 4 TIMES DAILY PRN
Qty: 118 ML | Refills: 0 | Status: SHIPPED | OUTPATIENT
Start: 2024-12-10

## 2024-12-10 RX ADMIN — DEXAMETHASONE SODIUM PHOSPHATE 10 MG: 10 INJECTION INTRAMUSCULAR; INTRAVENOUS at 12:00

## 2024-12-10 RX ADMIN — ALBUTEROL SULFATE 2.5 MG: 2.5 SOLUTION RESPIRATORY (INHALATION) at 11:45

## 2024-12-10 RX ADMIN — HYDROCODONE POLISTIREX AND CHLORPHENIRAMINE POLISTIREX 5 ML: 10; 8 SUSPENSION, EXTENDED RELEASE ORAL at 12:20

## 2024-12-10 NOTE — DISCHARGE INSTRUCTIONS
Today you are testing positive for RSV with the remainder of your respiratory panel negative and your chest x-ray well-appearing.  Please use the breathing treatments as we discussed.  Please complete your steroid course.  You may continue to use Motrin and Tylenol as needed.  You will need to follow-up with your primary care provider within the next 48 hours for close reevaluation however should you develop any new or worsening symptoms return to the ER for further evaluation.

## 2024-12-10 NOTE — Clinical Note
[facility] EMERGENCY DEPARTMENT  [address]  [facility] [address]  Phone: [phone]    My Chavez was seen and treated in our emergency department on 12/10/2024.  She may return to work on 12/12/2024.         Thank you for choosing [facility].    Carrillo Buenrostro PA-C

## 2024-12-10 NOTE — ED PROVIDER NOTES
"Subjective   History of Present Illness    Patient is a 29-year-old female presenting to ED with cough.  PMH significant for history of asthma, depression, GERD, anxiety.  Patient states that she has a young  aged son and also works as a  at [employer] where she has been around numerous sick contacts.  Patient reports a week ago she started developing a cough which yesterday turned \"deeper and wheezing.\"  Patient states that she has not had an asthma attack since being a young child but felt similar to this for which she is one of her sons breathing treatment and some improvement.  Patient tested herself at work yesterday and tested positive for RSV.  Patient states that she feels she cannot stop coughing and feels chest congestion and wheezing.  Patient denies any other chest pain/pressure/heaviness/tightness, fevers, chills, diaphoresis, posttussive emesis.  Patient denies any other symptoms and presents at this time for further evaluation.    Records reviewed show patient was last seen in the outpatient setting at the PCP office on 4/10/2024 for parainfluenza, left otitis media.    Patient last seen in the ED on 5/28/2020 for right lower quadrant pain, left ovary cyst.    Review of Systems   Constitutional: Negative.  Negative for chills, diaphoresis and fever.   HENT:  Positive for congestion. Negative for sinus pressure and sore throat.    Eyes: Negative.    Respiratory:  Positive for cough (Nonproductive), chest tightness and wheezing. Negative for shortness of breath.    Cardiovascular: Negative.  Negative for chest pain and palpitations.   Gastrointestinal: Negative.  Negative for diarrhea, nausea and vomiting.   Genitourinary: Negative.    Musculoskeletal: Negative.  Negative for myalgias.   Skin: Negative.  Negative for rash.   Allergic/Immunologic: Negative for immunocompromised state.   Neurological: Negative.  Negative for weakness.   Psychiatric/Behavioral: Negative. "     All other systems reviewed and are negative.      Past Medical History:   Diagnosis Date    Abdominal pain     Acid reflux     Anemia     Anxiety     Asthma     POLLEN CAN CAUSE SYMPTOMS NO ASTHMA FOR 8 YEARS    Depression     Diarrhea     Gallbladder abscess     GERD (gastroesophageal reflux disease)     Nausea and/or vomiting     Seasonal allergies        Allergies   Allergen Reactions    Contrave [Naltrexone-Bupropion Hcl Er] Nausea Only     Nausea         Past Surgical History:   Procedure Laterality Date    ADENOIDECTOMY      CHOLECYSTECTOMY  04/13/2018    CHOLECYSTECTOMY WITH INTRAOPERATIVE CHOLANGIOGRAM N/A 4/13/2018    Procedure: LAPAROSCOPIC CHOLECYSTECTOMY  WITH CHOLANGIOGRAM;  Surgeon: Martita Gunter MD;  Location: [facility] OR;  Service: General    COLONOSCOPY N/A 10/2/2017    Procedure: COLONOSCOPY WITH ANESTHESIA;  Surgeon: Manoj Madden DO;  Location: [facility] ENDOSCOPY;  Service:     ENDOSCOPY N/A 10/2/2017    Procedure: ESOPHAGOGASTRODUODENOSCOPY WITH ANESTHESIA;  Surgeon: Manoj Madden DO;  Location: [facility] ENDOSCOPY;  Service:     ENDOSCOPY N/A 3/8/2023    Procedure: ESOPHAGOGASTRODUODENOSCOPY WITH ANESTHESIA;  Surgeon: Manoj Madden DO;  Location: [facility] ENDOSCOPY;  Service: Gastroenterology;  Laterality: N/A;  pre dysphagia  post r/o eosiniphilic esophagitis  dr vinay guerra    LEG SURGERY      TONSILLECTOMY         Family History   Problem Relation Age of Onset    Hypertension Mother     Hypertension Father     No Known Problems Brother     Breast cancer Paternal Grandmother     Colon cancer Neg Hx     Esophageal cancer Neg Hx        Social History     Socioeconomic History    Marital status: Single    Number of children: 0    Years of education: 14   Tobacco Use    Smoking status: Former     Current packs/day: 0.00     Average packs/day: 0.1 packs/day for 2.1 years (0.2 ttl pk-yrs)     Types: Cigarettes     Start date: 9/27/2014     Quit date: 10/27/2016     Years since quitting:  8.1    Smokeless tobacco: Never   Vaping Use    Vaping status: Never Used   Substance and Sexual Activity    Alcohol use: Yes     Alcohol/week: 1.0 standard drink of alcohol     Types: 1 Glasses of wine per week     Comment: occ     Drug use: No    Sexual activity: Yes     Partners: Male     Birth control/protection: Condom           Objective   Physical Exam  Vitals and nursing note reviewed.   Constitutional:       General: She is not in acute distress.     Appearance: Normal appearance. She is well-developed and well-groomed. She is obese. She is not ill-appearing, toxic-appearing or diaphoretic.   HENT:      Head: Normocephalic.      Mouth/Throat:      Mouth: Mucous membranes are moist.      Pharynx: Oropharynx is clear.   Eyes:      Conjunctiva/sclera: Conjunctivae normal.      Pupils: Pupils are equal, round, and reactive to light.   Cardiovascular:      Rate and Rhythm: Normal rate and regular rhythm.   Pulmonary:      Effort: Pulmonary effort is normal. No respiratory distress.      Breath sounds: No stridor. Wheezing (Expiratory throughout all lung fields) present. No rhonchi or rales.   Chest:      Chest wall: No tenderness.   Abdominal:      Palpations: Abdomen is soft.   Musculoskeletal:         General: Normal range of motion.      Cervical back: Normal range of motion and neck supple. No rigidity.   Skin:     General: Skin is warm and dry.      Findings: No rash.   Neurological:      Mental Status: She is alert and oriented to person, place, and time.      Gait: Gait normal.   Psychiatric:         Mood and Affect: Mood normal.         Behavior: Behavior normal. Behavior is cooperative.         Procedures           ED Course                                                       Medical Decision Making  Amount and/or Complexity of Data Reviewed  External Data Reviewed: labs, radiology and notes.  Labs: ordered. Decision-making details documented in ED Course.  Radiology: ordered. Decision-making details  documented in ED Course.  ECG/medicine tests: ordered. Decision-making details documented in ED Course.    Risk  OTC drugs.  Prescription drug management.        Patient is a 29-year-old female presenting to ED with cough.  PMH significant for history of asthma, depression, GERD, anxiety.  Upon initial evaluation patient appears to be uncomfortable due to persistent cough but otherwise in no acute distress.  Nontoxic-appearing, non-ill-appearing, nondiaphoretic.  Patient with hypertensive systolic pressures in the 140s and was unremarkable vital signs but examination finds faint expiratory wheezing throughout all lung fields with no evidence of rales, rhonchi, chest wall tenderness.  Examination otherwise unremarkable including no viral exanthems or dermatological abnormalities.  No HEENT abnormalities to include otitis media or otitis externa, sinusitis, pharyngitis.  Neck is supple flocked range of motion no evidence of rigidity or meningismus.  Discussed with patient ability to give a breathing treatment, cough syrup as well as need for chest x-ray and respiratory panel for which she is amenable with no further questions concerns, needs at this time.    Differential diagnosis: RSV, bronchiolitis, bronchitis, pneumonia, rhinovirus, sinusitis, pharyngitis, otitis media, otitis externa, viral URI, asthma tach, other    Chest x-ray showed: No acute cardiopulmonary process.  Respiratory panel positive for RSV and otherwise unremarkable.  Patient was given a breathing treatment for which she had improvement in her wheezing.  Patient given a dose of Decadron.  Discussed with patient need for continued home use of breathing treatments for which she states she has a nebulizer but no bullets.  Advised symptomatic treatment with rest, hydration, anti-inflammatories as well as need for course of steroids.  Discussed need for PCP follow-up within the next 48 hours for close reevaluation, strict return precautions, need for  immediate return to ED should she develop any new or worsening symptoms.  Patient remained hemodynamically stable throughout evaluation was very appreciative with no further questions, concerns, needs at this time and is stable for discharge.    Final diagnoses:   RSV infection   Wheeze       ED Disposition  ED Disposition       ED Disposition   Discharge    Condition   Stable    Comment   --               Rod Buchanan, APRN  [address] [place] [address]  [phone]    Schedule an appointment as soon as possible for a visit in 2 days      [facility] EMERGENCY DEPARTMENT  [address] [place] [id number] [phone]    As needed         Medication List        New Prescriptions      brompheniramine-pseudoephedrine-DM 30-2-10 MG/5ML syrup  Take 5 mL by mouth 4 (Four) Times a Day As Needed for Cough or Congestion.     methylPREDNISolone 4 MG dose pack  Commonly known as: MEDROL  Take as directed on package instructions.            Changed      * albuterol 1.25 MG/3ML nebulizer solution  Commonly known as: ACCUNEB  Take 3 mL by nebulization Every 6 (Six) Hours As Needed for Shortness of Air or Wheezing.  What changed: Another medication with the same name was added. Make sure you understand how and when to take each.     * albuterol 1.25 MG/3ML nebulizer solution  Commonly known as: ACCUNEB  Take 3 mL by nebulization Every 6 (Six) Hours As Needed for Wheezing or Shortness of Air.  What changed: You were already taking a medication with the same name, and this prescription was added. Make sure you understand how and when to take each.           * This list has 2 medication(s) that are the same as other medications prescribed for you. Read the directions carefully, and ask your doctor or other care provider to review them with you.                   Where to Get Your Medications        These medications were sent to [facility]/pharmacy #[id number] - [place], [place] - [address] AT [address]OSS  FROM [place] - [phone]  - [phone] FX  [address] [facility] [address]      Phone: [phone]   albuterol 1.25 MG/3ML nebulizer solution  brompheniramine-pseudoephedrine-DM 30-2-10 MG/5ML syrup  methylPREDNISolone 4 MG dose pack            Carrillo Buenrostro PA-C  12/10/24 [id number]

## 2024-12-12 NOTE — TELEPHONE ENCOUNTER
Rx Refill Note  Requested Prescriptions     Pending Prescriptions Disp Refills    citalopram (CeleXA) 20 MG tablet 30 tablet 5     Sig: Take 1 tablet by mouth Daily.      Last office visit with office: 04/10/2024  Next office visit with office: 12/17/2024    UDS:     DATE OF LAST REFILL: 11/04/2024 - 5 REFILLS    Controlled Substance Agreement:     QUIN OR ANDIE:          {TIP  Is Refill Pharmacy correct?:  Kirsten Castelan MA  12/12/24, 11:38 CST

## 2024-12-13 RX ORDER — CITALOPRAM HYDROBROMIDE 20 MG/1
20 TABLET ORAL DAILY
Qty: 90 TABLET | Refills: 1 | Status: SHIPPED | OUTPATIENT
Start: 2024-12-13

## 2025-01-07 ENCOUNTER — PATIENT MESSAGE (OUTPATIENT)
Dept: OBGYN CLINIC | Age: 30
End: 2025-01-07

## 2025-01-09 DIAGNOSIS — F98.8 ATTENTION DEFICIT DISORDER (ADD) WITHOUT HYPERACTIVITY: ICD-10-CM

## 2025-01-09 RX ORDER — METHYLPHENIDATE HYDROCHLORIDE 27 MG/1
27 TABLET ORAL EVERY MORNING
Qty: 30 TABLET | Refills: 0 | Status: SHIPPED | OUTPATIENT
Start: 2025-01-09

## 2025-01-09 NOTE — TELEPHONE ENCOUNTER
Caller: My Chavez    Relationship: Self    Best call back number: [phone]     Requested Prescriptions:   Requested Prescriptions     Pending Prescriptions Disp Refills    methylphenidate 27 MG CR tablet 30 tablet 0     Sig: Take 1 tablet by mouth Every Morning        Pharmacy where request should be sent: [facility]/PHARMACY #[id number] - [place], [place] - [address] AT ACROSS FROM [facility] [phone] [facility] [phone]      Last office visit with prescribing clinician: 4/10/2024   Last telemedicine visit with prescribing clinician: Visit date not found   Next office visit with prescribing clinician: Visit date not found     Additional details provided by patient:     Does the patient have less than a 3 day supply:  [] Yes  [x] No    Would you like a call back once the refill request has been completed: [x] Yes [] No    If the office needs to give you a call back, can they leave a voicemail: [] Yes [] No    Antolin Frederick Rep   01/09/25 13:47 CST

## 2025-01-09 NOTE — TELEPHONE ENCOUNTER
Rx Refill Note  Requested Prescriptions     Pending Prescriptions Disp Refills    methylphenidate 27 MG CR tablet 30 tablet 0     Sig: Take 1 tablet by mouth Every Morning      Last office visit with office: 04/10/24  Next office visit with office: none    UDS: none    DATE OF LAST REFILL: 12/09/24    Controlled Substance Agreement: not up to date    QUIN OR ANDIE: 01/09/25         {TIP  Is Refill Pharmacy correct?:  Pita Will MA  01/09/25, 13:48 CST

## 2025-01-27 ENCOUNTER — TELEPHONE (OUTPATIENT)
Dept: OBGYN CLINIC | Age: 30
End: 2025-01-27

## 2025-01-27 NOTE — TELEPHONE ENCOUNTER
Patient called to schedule an appointment for possible bacterial vaginosis. Please return patient's call.

## 2025-01-31 ENCOUNTER — OFFICE VISIT (OUTPATIENT)
Dept: OBGYN CLINIC | Age: 30
End: 2025-01-31
Payer: COMMERCIAL

## 2025-01-31 VITALS
SYSTOLIC BLOOD PRESSURE: 140 MMHG | HEART RATE: 89 BPM | BODY MASS INDEX: 45.99 KG/M2 | DIASTOLIC BLOOD PRESSURE: 84 MMHG | HEIGHT: 67 IN | WEIGHT: 293 LBS

## 2025-01-31 DIAGNOSIS — N89.8 VAGINAL DISCHARGE: Primary | ICD-10-CM

## 2025-01-31 PROCEDURE — 99214 OFFICE O/P EST MOD 30 MIN: CPT

## 2025-01-31 RX ORDER — FLUCONAZOLE 150 MG/1
150 TABLET ORAL
Qty: 2 TABLET | Refills: 0 | Status: SHIPPED | OUTPATIENT
Start: 2025-01-31 | End: 2025-02-06

## 2025-01-31 RX ORDER — METRONIDAZOLE 500 MG/1
500 TABLET ORAL 2 TIMES DAILY
Qty: 14 TABLET | Refills: 0 | Status: SHIPPED | OUTPATIENT
Start: 2025-01-31 | End: 2025-02-07

## 2025-01-31 NOTE — PROGRESS NOTES
Pt presents today for a possible bacterial infection. Pt states it started with a watery discharge. States it burned and itched so she treated herself with monistat. Pt states this has been going on for about 9 days. States her skin is really irritated and raw.   
  Genitourinary:  Positive for vaginal discharge. Negative for difficulty urinating, dyspareunia, frequency, genital sores, menstrual problem, pelvic pain and vaginal pain.   Musculoskeletal: Negative.  Negative for back pain and gait problem.   Skin: Negative.    Neurological: Negative.  Negative for dizziness and headaches.   Psychiatric/Behavioral: Negative.  Negative for behavioral problems, self-injury and suicidal ideas. The patient is not nervous/anxious.        Physical Exam  Vitals and nursing note reviewed.   Constitutional:       Appearance: Normal appearance. She is well-developed. She is not diaphoretic.   HENT:      Head: Normocephalic and atraumatic.      Nose: Nose normal.   Eyes:      Extraocular Movements: Extraocular movements intact.      Conjunctiva/sclera: Conjunctivae normal.      Pupils: Pupils are equal, round, and reactive to light.   Neck:      Thyroid: No thyromegaly.      Trachea: No tracheal deviation.   Pulmonary:      Effort: Pulmonary effort is normal. No respiratory distress.   Musculoskeletal:         General: Normal range of motion.      Cervical back: Normal range of motion and neck supple.      Comments: Normal ROM for upper and lower extremities. Gait steady.   Skin:     General: Skin is warm and dry.      Findings: No lesion or rash.   Neurological:      Mental Status: She is alert and oriented to person, place, and time.   Psychiatric:         Mood and Affect: Mood normal.         Speech: Speech normal.         Behavior: Behavior normal.         MEDICATIONS:  Orders Placed This Encounter   Medications    metroNIDAZOLE (FLAGYL) 500 MG tablet     Sig: Take 1 tablet by mouth 2 times daily for 7 days     Dispense:  14 tablet     Refill:  0    fluconazole (DIFLUCAN) 150 MG tablet     Sig: Take 1 tablet by mouth every 72 hours for 6 days     Dispense:  2 tablet     Refill:  0       ORDERS:  Orders Placed This Encounter   Procedures    Miscellaneous Sendout 2

## 2025-02-10 DIAGNOSIS — F98.8 ATTENTION DEFICIT DISORDER (ADD) WITHOUT HYPERACTIVITY: ICD-10-CM

## 2025-02-10 RX ORDER — PANTOPRAZOLE SODIUM 40 MG/1
40 TABLET, DELAYED RELEASE ORAL
Qty: 60 TABLET | Refills: 11 | Status: SHIPPED | OUTPATIENT
Start: 2025-02-10

## 2025-02-10 RX ORDER — CITALOPRAM HYDROBROMIDE 20 MG/1
20 TABLET ORAL DAILY
Qty: 90 TABLET | Refills: 1 | Status: SHIPPED | OUTPATIENT
Start: 2025-02-10

## 2025-02-10 RX ORDER — METHYLPHENIDATE HYDROCHLORIDE 27 MG/1
27 TABLET ORAL EVERY MORNING
Qty: 30 TABLET | Refills: 0 | Status: SHIPPED | OUTPATIENT
Start: 2025-02-10

## 2025-02-10 ASSESSMENT — ENCOUNTER SYMPTOMS
NAUSEA: 0
CHEST TIGHTNESS: 0
GASTROINTESTINAL NEGATIVE: 1
BACK PAIN: 0
CONSTIPATION: 0
ABDOMINAL PAIN: 0
RECTAL PAIN: 0
RESPIRATORY NEGATIVE: 1
SHORTNESS OF BREATH: 0
DIARRHEA: 0

## 2025-02-10 NOTE — TELEPHONE ENCOUNTER
Rx Refill Note  Requested Prescriptions     Pending Prescriptions Disp Refills    methylphenidate 27 MG CR tablet 30 tablet 0     Sig: Take 1 tablet by mouth Every Morning    citalopram (CeleXA) 20 MG tablet 90 tablet 1     Sig: Take 1 tablet by mouth Daily.    pantoprazole (PROTONIX) 40 MG EC tablet 60 tablet 11     Sig: Take 1 tablet by mouth 2 (Two) Times a Day Before Meals.      Last office visit with office: 04/10/2024  Next office visit with office: none    UDS: none - 2022    DATE OF LAST REFILL: 01/09/2025    Controlled Substance Agreement: not up to date - 2022    QUIN OR ANDIE: wnl         {TIP  Is Refill Pharmacy correct?:  Kirsten Castelan MA  02/10/25, 12:55 CST

## 2025-02-10 NOTE — TELEPHONE ENCOUNTER
Caller: My Chavez    Relationship: Self    Best call back number:     [phone]     Requested Prescriptions:   Requested Prescriptions     Pending Prescriptions Disp Refills    methylphenidate 27 MG CR tablet 30 tablet 0     Sig: Take 1 tablet by mouth Every Morning    citalopram (CeleXA) 20 MG tablet 90 tablet 1     Sig: Take 1 tablet by mouth Daily.    pantoprazole (PROTONIX) 40 MG EC tablet 60 tablet 11     Sig: Take 1 tablet by mouth 2 (Two) Times a Day Before Meals.        Pharmacy where request should be sent: [facility]/PHARMACY #[id number] - [place], [place] - [address] AT [address] FROM [address]  [phone] [facility] [phone]      Last office visit with prescribing clinician: 4/10/2024   Last telemedicine visit with prescribing clinician: Visit date not found   Next office visit with prescribing clinician: Visit date not found     Additional details provided by patient: PATIENT NEEDS ONE LAST REFILL    Does the patient have less than a 3 day supply:  [x] Yes  [] No    Would you like a call back once the refill request has been completed: [x] Yes [] No    If the office needs to give you a call back, can they leave a voicemail: [x] Yes [] No    Antolin Finnegan Rep   02/10/25 11:58 CST

## 2025-04-23 ENCOUNTER — TELEPHONE (OUTPATIENT)
Dept: FAMILY MEDICINE CLINIC | Facility: CLINIC | Age: 30
End: 2025-04-23

## 2025-04-23 NOTE — TELEPHONE ENCOUNTER
Caller: Kathy My    Relationship: Self    Best call back number:     [phone]       What form or medical record are you requesting: VACCINATION RECORD     Who is requesting this form or medical record from you: [employer] [facility]   SHE STATES FOR WORK   EMPLOYEE [employer]   How would you like to receive the form or medical records (pick-up, mail, fax):  AND FAX TO [employer] EMPLOYEE   If fax, what is the fax number:   If mail, what is the address:   If pick-up, provide patient with address and location details    Timeframe paperwork needed: SOON     Additional notes:  NONE

## 2025-04-24 NOTE — TELEPHONE ENCOUNTER
Pt will need to go to the health department in order to get all of her vaccination records. Unable to leave  -hub to relay

## 2025-07-29 ENCOUNTER — OFFICE VISIT (OUTPATIENT)
Dept: OBGYN CLINIC | Age: 30
End: 2025-07-29
Payer: COMMERCIAL

## 2025-07-29 VITALS
HEART RATE: 46 BPM | BODY MASS INDEX: 45.99 KG/M2 | DIASTOLIC BLOOD PRESSURE: 81 MMHG | WEIGHT: 293 LBS | SYSTOLIC BLOOD PRESSURE: 145 MMHG | HEIGHT: 67 IN

## 2025-07-29 DIAGNOSIS — N89.8 VAGINAL DISCHARGE: ICD-10-CM

## 2025-07-29 DIAGNOSIS — Z83.3 FAMILY HISTORY OF DIABETES MELLITUS: ICD-10-CM

## 2025-07-29 DIAGNOSIS — R63.2 EXCESSIVE EATING: ICD-10-CM

## 2025-07-29 DIAGNOSIS — N89.8 VAGINAL IRRITATION: ICD-10-CM

## 2025-07-29 DIAGNOSIS — N64.52 NIPPLE DISCHARGE: Primary | ICD-10-CM

## 2025-07-29 DIAGNOSIS — N94.10 PAIN IN FEMALE GENITALIA ON INTERCOURSE: ICD-10-CM

## 2025-07-29 DIAGNOSIS — E66.813 CLASS 3 SEVERE OBESITY WITH BODY MASS INDEX (BMI) OF 45.0 TO 49.9 IN ADULT, UNSPECIFIED OBESITY TYPE, UNSPECIFIED WHETHER SERIOUS COMORBIDITY PRESENT (HCC): ICD-10-CM

## 2025-07-29 PROCEDURE — 99214 OFFICE O/P EST MOD 30 MIN: CPT

## 2025-07-29 SDOH — ECONOMIC STABILITY: FOOD INSECURITY: WITHIN THE PAST 12 MONTHS, YOU WORRIED THAT YOUR FOOD WOULD RUN OUT BEFORE YOU GOT MONEY TO BUY MORE.: NEVER TRUE

## 2025-07-29 SDOH — ECONOMIC STABILITY: FOOD INSECURITY: WITHIN THE PAST 12 MONTHS, THE FOOD YOU BOUGHT JUST DIDN'T LAST AND YOU DIDN'T HAVE MONEY TO GET MORE.: NEVER TRUE

## 2025-07-29 ASSESSMENT — ENCOUNTER SYMPTOMS
SHORTNESS OF BREATH: 0
ABDOMINAL PAIN: 0
RESPIRATORY NEGATIVE: 1
GASTROINTESTINAL NEGATIVE: 1
CONSTIPATION: 0
DIARRHEA: 0
CHEST TIGHTNESS: 0
RECTAL PAIN: 0
NAUSEA: 0
BACK PAIN: 0

## 2025-07-29 ASSESSMENT — PATIENT HEALTH QUESTIONNAIRE - PHQ9
SUM OF ALL RESPONSES TO PHQ QUESTIONS 1-9: 1
SUM OF ALL RESPONSES TO PHQ QUESTIONS 1-9: 1
2. FEELING DOWN, DEPRESSED OR HOPELESS: NOT AT ALL
SUM OF ALL RESPONSES TO PHQ QUESTIONS 1-9: 1
1. LITTLE INTEREST OR PLEASURE IN DOING THINGS: SEVERAL DAYS
SUM OF ALL RESPONSES TO PHQ QUESTIONS 1-9: 1

## 2025-07-29 NOTE — PROGRESS NOTES
Pt presents today for pap smear and breast exam. She feels like she keeps getting bacterial infections. She states she washes with the ph correct washes and keeps herself clean, but keeps getting infections. States she has discomfort, irritation, slight odor, and pain with intercourse. Feels her hormones are out of wack. States two weeks ago she started lactating and she knows she's not pregnant. States she has not had intercourse in 4 months due to the pain during intercourse and has had a regular cycle each month. Would also like to discuss weight loss medication.     Depression screening score: 1  Anxiety screening score: 12  Last mammogram: None    Last pap smear: Never   Contraception: Condoms    : 1   Para: 1   AB: 0   Last bone density: None    Last colonoscopy: None   Sexual Active: Yes    
lesion or rash.   Neurological:      Mental Status: She is alert and oriented to person, place, and time.   Psychiatric:         Mood and Affect: Mood normal.         Speech: Speech normal.         Behavior: Behavior normal.         MEDICATIONS:  Orders Placed This Encounter   Medications    tirzepatide-weight management (ZEPBOUND) 2.5 MG/0.5ML SOAJ subCUTAneous auto-injector pen     Sig: Inject 2.5 mg into the skin every 7 days     Dispense:  2 mL     Refill:  0       ORDERS:  Orders Placed This Encounter   Procedures    CBC    Comprehensive Metabolic Panel    Hemoglobin A1C    Lipid Panel    T4, Free    TSH    Insulin Free & Total    Prolactin    Miscellaneous Sendout 2       Plan:  Assessment & Plan  1. Recurrent bacterial infection.  - The patient reports watery discharge with a fishy smell, which has persisted since the last visit.  - A swab will be taken today to identify the specific bacteria causing the infection. The patient will be notified of the swab results via [employer].  - Depending on the swab results, treatment may include boric acid vaginal suppositories or Flagyl cream or gel twice a week for 3 to 4 months. A Pap smear will be deferred until the infection is resolved.    2. Weight management.  - The patient reports a continuous struggle with weight gain and a feeling of food addiction.  - Labs will be ordered to check thyroid function, blood count, and fasting insulin levels. If insulin resistance is detected, diabetic medications may be considered. Side effects such as nausea, diarrhea, and constipation were discussed.  - GLP-1 injections will be prescribed, starting with the lowest dose. The patient is advised to increase protein intake to 30-40 grams per meal, drink plenty of water, and engage in regular exercise to elevate heart rate. The prescription will be sent to pharmacy.    3. Lactation.  - The patient reports unexpected lactation approximately 2 weeks ago, with white milk discharge.  - A

## 2025-07-31 DIAGNOSIS — Z83.3 FAMILY HISTORY OF DIABETES MELLITUS: ICD-10-CM

## 2025-07-31 DIAGNOSIS — R63.2 EXCESSIVE EATING: ICD-10-CM

## 2025-07-31 DIAGNOSIS — N64.52 NIPPLE DISCHARGE: ICD-10-CM

## 2025-07-31 LAB
ALBUMIN SERPL-MCNC: 4.6 G/DL (ref 3.5–5.2)
ALP SERPL-CCNC: 101 U/L (ref 35–104)
ALT SERPL-CCNC: 63 U/L (ref 10–35)
ANION GAP SERPL CALCULATED.3IONS-SCNC: 13 MMOL/L (ref 8–16)
AST SERPL-CCNC: 31 U/L (ref 10–35)
BILIRUB SERPL-MCNC: 1 MG/DL (ref 0.2–1.2)
BUN SERPL-MCNC: 13 MG/DL (ref 6–20)
CALCIUM SERPL-MCNC: 9.3 MG/DL (ref 8.6–10)
CHLORIDE SERPL-SCNC: 105 MMOL/L (ref 98–107)
CHOLEST SERPL-MCNC: 191 MG/DL (ref 0–199)
CO2 SERPL-SCNC: 22 MMOL/L (ref 22–29)
CREAT SERPL-MCNC: 0.9 MG/DL (ref 0.5–0.9)
ERYTHROCYTE [DISTWIDTH] IN BLOOD BY AUTOMATED COUNT: 15 % (ref 11.5–14.5)
GLUCOSE SERPL-MCNC: 83 MG/DL (ref 70–99)
HBA1C MFR BLD: 5.4 % (ref 4–5.6)
HCT VFR BLD AUTO: 39.8 % (ref 37–47)
HDLC SERPL-MCNC: 49 MG/DL (ref 40–60)
HGB BLD-MCNC: 12.6 G/DL (ref 12–16)
LDLC SERPL CALC-MCNC: 125 MG/DL
MCH RBC QN AUTO: 24.9 PG (ref 27–31)
MCHC RBC AUTO-ENTMCNC: 31.7 G/DL (ref 33–37)
MCV RBC AUTO: 78.5 FL (ref 81–99)
PLATELET # BLD AUTO: 261 K/UL (ref 130–400)
PMV BLD AUTO: 10.6 FL (ref 9.4–12.3)
POTASSIUM SERPL-SCNC: 3.8 MMOL/L (ref 3.5–5.1)
PROLACTIN SERPL-MCNC: 14.1 NG/ML (ref 4.79–23.3)
PROT SERPL-MCNC: 7.5 G/DL (ref 6.4–8.3)
RBC # BLD AUTO: 5.07 M/UL (ref 4.2–5.4)
SODIUM SERPL-SCNC: 140 MMOL/L (ref 136–145)
T4 FREE SERPL-MCNC: 1.2 NG/DL (ref 0.93–1.7)
TRIGL SERPL-MCNC: 85 MG/DL (ref 0–149)
TSH SERPL DL<=0.005 MIU/L-ACNC: 1.94 UIU/ML (ref 0.27–4.2)
WBC # BLD AUTO: 9.4 K/UL (ref 4.8–10.8)

## 2025-08-03 LAB
INSULIN FREE SERPL-ACNC: 11 UIU/ML (ref 3–25)
INSULIN SERPL-ACNC: 13 UIU/ML (ref 3–25)

## (undated) DEVICE — MSK O2 MD CONCENTR A/ LF 7FT 1P/U

## (undated) DEVICE — ENDOGATOR AUXILIARY WATER JET CONNECTOR: Brand: ENDOGATOR

## (undated) DEVICE — ENDOPATH PNEUMONEEDLE INSUFFLATION NEEDLES WITH LUER LOCK CONNECTORS 120MM: Brand: ENDOPATH

## (undated) DEVICE — FRCP BX RADJAW4 NDL 2.8 240 STD OG

## (undated) DEVICE — CONMED SCOPE SAVER BITE BLOCK, 20X27 MM: Brand: SCOPE SAVER

## (undated) DEVICE — ENDOPATH XCEL WITH OPTIVIEW TECHNOLOGY UNIVERSAL TROCAR STABILITY SLEEVES: Brand: ENDOPATH XCEL OPTIVIEW

## (undated) DEVICE — THE CHANNEL CLEANING BRUSH IS A NYLON FLEXI BRUSH ATTACHED TO A FLEXIBLE PLASTIC SHEATH DESIGNED TO SAFELY REMOVE DEBRIS FROM FLEXIBLE ENDOSCOPES.

## (undated) DEVICE — TOWEL,OR,DSP,ST,BLUE,STD,4/PK,20PK/CS: Brand: MEDLINE

## (undated) DEVICE — 2, DISPOSABLE SUCTION/IRRIGATOR WITHOUT DISPOSABLE TIP: Brand: STRYKEFLOW

## (undated) DEVICE — PAD LAP CHOLE: Brand: MEDLINE INDUSTRIES, INC.

## (undated) DEVICE — ENDOPOUCH RETRIEVER SPECIMEN RETRIEVAL BAGS: Brand: ENDOPOUCH RETRIEVER

## (undated) DEVICE — SENSR O2 OXIMAX FNGR A/ 18IN NONSTR

## (undated) DEVICE — APPL CHLORAPREP W/TINT 26ML ORNG

## (undated) DEVICE — Device: Brand: DEFENDO AIR/WATER/SUCTION AND BIOPSY VALVE

## (undated) DEVICE — MONOPOLAR METZENBAUM SCISSOR, MINI BLADE TIP, DISPOSABLE: Brand: MONOPOLAR METZENBAUM SCISSOR, MINI BLADE TIP, DISPOSABLE

## (undated) DEVICE — CUFF,BP,DISP,1 TUBE,ADULT,HP: Brand: MEDLINE

## (undated) DEVICE — SUT VIC 0 UR6 27IN VCP603H

## (undated) DEVICE — 3M™ STERI-STRIP™ REINFORCED ADHESIVE SKIN CLOSURES, R1546, 1/4 IN X 4 IN (6 MM X 100 MM), 10 STRIPS/ENVELOPE: Brand: 3M™ STERI-STRIP™

## (undated) DEVICE — ENDOPATH XCEL WITH OPTIVIEW TECHNOLOGY DILATING TIP TROCARS WITH STABILITY SLEEVES: Brand: ENDOPATH XCEL OPTIVIEW

## (undated) DEVICE — YANKAUER,BULB TIP WITH VENT: Brand: ARGYLE

## (undated) DEVICE — ANTIBACTERIAL UNDYED BRAIDED (POLYGLACTIN 910), SYNTHETIC ABSORBABLE SUTURE: Brand: COATED VICRYL

## (undated) DEVICE — PK TURNOVER RM ADV

## (undated) DEVICE — GLV SURG BIOGEL M LTX PF 7 1/2

## (undated) DEVICE — ENDOPATH XCEL DILATING TIP TROCARS WITH STABILITY SLEEVES: Brand: ENDOPATH XCEL

## (undated) DEVICE — PAD GRND REM POLYHESIVE A/ DISP

## (undated) DEVICE — LAPAROSCOPIC MONOPOLAR CORD: Brand: VALLEYLAB

## (undated) DEVICE — TBG SMPL FLTR LINE NASL 02/C02 A/ BX/100